# Patient Record
Sex: FEMALE | Race: WHITE | NOT HISPANIC OR LATINO | Employment: OTHER | URBAN - METROPOLITAN AREA
[De-identification: names, ages, dates, MRNs, and addresses within clinical notes are randomized per-mention and may not be internally consistent; named-entity substitution may affect disease eponyms.]

---

## 2017-01-25 ENCOUNTER — HOSPITAL ENCOUNTER (OUTPATIENT)
Dept: CARDIOLOGY | Facility: HOSPITAL | Age: 72
Discharge: HOME | End: 2017-01-25

## 2017-06-12 ENCOUNTER — HOSPITAL ENCOUNTER (OUTPATIENT)
Dept: LAB | Facility: HOSPITAL | Age: 72
Discharge: HOME | End: 2017-06-12
Attending: INTERNAL MEDICINE | Admitting: INTERNAL MEDICINE

## 2017-06-12 LAB
ALBUMIN SERPL-MCNC: 4 G/DL (ref 3.5–4.8)
ALP SERPL-CCNC: 115 IU/L (ref 32–91)
ALT SERPL-CCNC: 17 IU/L (ref 14–54)
ANION GAP SERPL CALC-SCNC: 11.5 MMOL/L (ref 10–20)
AST SERPL-CCNC: 19 IU/L (ref 15–41)
BILIRUB DIRECT SERPL-MCNC: 0.1 MG/DL (ref 0.1–0.5)
BILIRUB SERPL-MCNC: 0.6 MG/DL (ref 0.3–1.2)
BUN SERPL-MCNC: 12 MG/DL (ref 8–20)
BUN/CREAT SERPL: 13.3 (ref 5.4–26.2)
CALCIUM SERPL-MCNC: 9.5 MG/DL (ref 8.9–10.3)
CHLORIDE SERPL-SCNC: 104 MMOL/L (ref 101–111)
CHOLEST SERPL-MCNC: 130 MG/DL
CHOLEST/HDLC SERPL: 2.5 {RATIO}
CK SERPL-CCNC: 66 IU/L (ref 38–234)
CONV CO2: 28 MMOL/L (ref 22–32)
CONV LDL CHOLESTEROL DIRECT: 63 MG/DL (ref 0–100)
CONV TOTAL PROTEIN: 7 G/DL (ref 6.1–7.9)
CREAT UR-MCNC: 0.9 MG/DL (ref 0.4–1)
GLUCOSE SERPL-MCNC: 108 MG/DL (ref 65–99)
HDLC SERPL-MCNC: 53 MG/DL
LDLC/HDLC SERPL: 1.2 {RATIO}
LIPID INTERPRETATION: NORMAL
POTASSIUM SERPL-SCNC: 4.5 MMOL/L (ref 3.6–5.1)
SODIUM SERPL-SCNC: 139 MMOL/L (ref 136–144)
TRIGL SERPL-MCNC: 52 MG/DL
VLDLC SERPL CALC-MCNC: 13.7 MG/DL

## 2017-07-20 ENCOUNTER — HOSPITAL ENCOUNTER (OUTPATIENT)
Dept: MAMMOGRAPHY | Facility: HOSPITAL | Age: 72
Discharge: HOME | End: 2017-07-20
Attending: FAMILY MEDICINE | Admitting: FAMILY MEDICINE

## 2017-12-20 ENCOUNTER — HOSPITAL ENCOUNTER (OUTPATIENT)
Dept: CARDIOLOGY | Facility: HOSPITAL | Age: 72
Setting detail: SPECIMEN
Discharge: HOME | End: 2017-12-20
Attending: INTERNAL MEDICINE | Admitting: INTERNAL MEDICINE

## 2017-12-20 LAB
ALBUMIN SERPL-MCNC: 3.3 G/DL (ref 3.5–4.8)
ALP SERPL-CCNC: 99 IU/L (ref 32–91)
ALT SERPL-CCNC: 16 IU/L (ref 14–54)
ANION GAP SERPL CALC-SCNC: 13.3 MMOL/L (ref 10–20)
AST SERPL-CCNC: 17 IU/L (ref 15–41)
BILIRUB DIRECT SERPL-MCNC: 0.1 MG/DL (ref 0.1–0.5)
BILIRUB SERPL-MCNC: 0.6 MG/DL (ref 0.3–1.2)
BUN SERPL-MCNC: 15 MG/DL (ref 8–20)
BUN/CREAT SERPL: 16.7 (ref 5.4–26.2)
CALCIUM SERPL-MCNC: 9 MG/DL (ref 8.9–10.3)
CHLORIDE SERPL-SCNC: 98 MMOL/L (ref 101–111)
CHOLEST SERPL-MCNC: 119 MG/DL
CHOLEST/HDLC SERPL: 2.3 {RATIO}
CK SERPL-CCNC: 78 IU/L (ref 38–234)
CONV CO2: 27 MMOL/L (ref 22–32)
CONV LDL CHOLESTEROL DIRECT: 56 MG/DL (ref 0–100)
CONV TOTAL PROTEIN: 6.4 G/DL (ref 6.1–7.9)
CREAT UR-MCNC: 0.9 MG/DL (ref 0.4–1)
GLUCOSE SERPL-MCNC: 118 MG/DL (ref 65–99)
HDLC SERPL-MCNC: 51 MG/DL
LDLC/HDLC SERPL: 1.1 {RATIO}
LIPID INTERPRETATION: NORMAL
POTASSIUM SERPL-SCNC: 4.3 MMOL/L (ref 3.6–5.1)
SODIUM SERPL-SCNC: 134 MMOL/L (ref 136–144)
TRIGL SERPL-MCNC: 57 MG/DL
VLDLC SERPL CALC-MCNC: 12.2 MG/DL

## 2018-07-23 ENCOUNTER — HOSPITAL ENCOUNTER (OUTPATIENT)
Dept: CARDIOLOGY | Facility: HOSPITAL | Age: 73
Setting detail: SPECIMEN
Discharge: HOME | End: 2018-07-23
Attending: INTERNAL MEDICINE | Admitting: INTERNAL MEDICINE

## 2018-07-23 LAB
ALBUMIN SERPL-MCNC: 3.8 G/DL (ref 3.5–4.8)
ALP SERPL-CCNC: 115 IU/L (ref 32–91)
ALT SERPL-CCNC: 16 IU/L (ref 14–54)
ANION GAP SERPL CALC-SCNC: 11.3 MMOL/L (ref 10–20)
AST SERPL-CCNC: 20 IU/L (ref 15–41)
BILIRUB DIRECT SERPL-MCNC: 0.1 MG/DL (ref 0.1–0.5)
BILIRUB SERPL-MCNC: 0.7 MG/DL (ref 0.3–1.2)
BUN SERPL-MCNC: 16 MG/DL (ref 8–20)
BUN/CREAT SERPL: 16 (ref 5.4–26.2)
CALCIUM SERPL-MCNC: 9.5 MG/DL (ref 8.9–10.3)
CHLORIDE SERPL-SCNC: 102 MMOL/L (ref 101–111)
CHOLEST SERPL-MCNC: 119 MG/DL
CHOLEST/HDLC SERPL: 2.5 {RATIO}
CK SERPL-CCNC: 52 IU/L (ref 38–234)
CONV CO2: 28 MMOL/L (ref 22–32)
CONV LDL CHOLESTEROL DIRECT: 60 MG/DL (ref 0–100)
CONV TOTAL PROTEIN: 6.5 G/DL (ref 6.1–7.9)
CREAT UR-MCNC: 1 MG/DL (ref 0.4–1)
GLUCOSE SERPL-MCNC: 103 MG/DL (ref 65–99)
HDLC SERPL-MCNC: 47 MG/DL
LDLC/HDLC SERPL: 1.3 {RATIO}
LIPID INTERPRETATION: NORMAL
POTASSIUM SERPL-SCNC: 4.3 MMOL/L (ref 3.6–5.1)
SODIUM SERPL-SCNC: 137 MMOL/L (ref 136–144)
TRIGL SERPL-MCNC: 66 MG/DL
VLDLC SERPL CALC-MCNC: 12.1 MG/DL

## 2019-01-22 ENCOUNTER — HOSPITAL ENCOUNTER (OUTPATIENT)
Dept: LAB | Facility: HOSPITAL | Age: 74
Setting detail: SPECIMEN
Discharge: HOME | End: 2019-01-22
Attending: INTERNAL MEDICINE | Admitting: INTERNAL MEDICINE

## 2019-01-22 LAB
ALBUMIN SERPL-MCNC: 3.7 G/DL (ref 3.5–4.8)
ALP SERPL-CCNC: 132 IU/L (ref 32–91)
ALT SERPL-CCNC: 22 IU/L (ref 14–54)
ANION GAP SERPL CALC-SCNC: 15.6 MMOL/L (ref 10–20)
AST SERPL-CCNC: 24 IU/L (ref 15–41)
BILIRUB DIRECT SERPL-MCNC: 0.1 MG/DL (ref 0.1–0.5)
BILIRUB SERPL-MCNC: 0.5 MG/DL (ref 0.3–1.2)
BUN SERPL-MCNC: 10 MG/DL (ref 8–20)
BUN/CREAT SERPL: 11.1 (ref 5.4–26.2)
CALCIUM SERPL-MCNC: 9 MG/DL (ref 8.9–10.3)
CHLORIDE SERPL-SCNC: 97 MMOL/L (ref 101–111)
CHOLEST SERPL-MCNC: 126 MG/DL
CHOLEST/HDLC SERPL: 2.3 {RATIO}
CK SERPL-CCNC: 118 IU/L (ref 38–234)
CONV CO2: 25 MMOL/L (ref 22–32)
CONV LDL CHOLESTEROL DIRECT: 65 MG/DL (ref 0–100)
CONV TOTAL PROTEIN: 6.5 G/DL (ref 6.1–7.9)
CREAT UR-MCNC: 0.9 MG/DL (ref 0.4–1)
GLUCOSE SERPL-MCNC: 96 MG/DL (ref 65–99)
HDLC SERPL-MCNC: 55 MG/DL
LDLC/HDLC SERPL: 1.2 {RATIO}
LIPID INTERPRETATION: NORMAL
POTASSIUM SERPL-SCNC: 4.6 MMOL/L (ref 3.6–5.1)
SODIUM SERPL-SCNC: 133 MMOL/L (ref 136–144)
TRIGL SERPL-MCNC: 39 MG/DL
VLDLC SERPL CALC-MCNC: 6.3 MG/DL

## 2019-07-31 ENCOUNTER — OFFICE VISIT (OUTPATIENT)
Dept: FAMILY MEDICINE CLINIC | Facility: CLINIC | Age: 74
End: 2019-07-31

## 2019-07-31 VITALS
BODY MASS INDEX: 30.3 KG/M2 | OXYGEN SATURATION: 93 % | HEIGHT: 63 IN | TEMPERATURE: 98.2 F | SYSTOLIC BLOOD PRESSURE: 125 MMHG | WEIGHT: 171 LBS | DIASTOLIC BLOOD PRESSURE: 67 MMHG | HEART RATE: 53 BPM

## 2019-07-31 DIAGNOSIS — R06.2 WHEEZING: ICD-10-CM

## 2019-07-31 DIAGNOSIS — H61.22 IMPACTED CERUMEN OF LEFT EAR: ICD-10-CM

## 2019-07-31 DIAGNOSIS — J15.9 COMMUNITY ACQUIRED BACTERIAL PNEUMONIA: Primary | ICD-10-CM

## 2019-07-31 PROCEDURE — 99213 OFFICE O/P EST LOW 20 MIN: CPT | Performed by: FAMILY MEDICINE

## 2019-07-31 RX ORDER — DOXYCYCLINE HYCLATE 100 MG/1
100 TABLET, DELAYED RELEASE ORAL 2 TIMES DAILY
Qty: 20 TABLET | Refills: 0 | Status: SHIPPED | OUTPATIENT
Start: 2019-07-31 | End: 2019-09-18

## 2019-07-31 RX ORDER — BUDESONIDE AND FORMOTEROL FUMARATE DIHYDRATE 160; 4.5 UG/1; UG/1
AEROSOL RESPIRATORY (INHALATION)
COMMUNITY
Start: 2017-06-29 | End: 2019-07-31

## 2019-08-01 PROCEDURE — 69210 REMOVE IMPACTED EAR WAX UNI: CPT | Performed by: FAMILY MEDICINE

## 2019-08-12 PROBLEM — I25.10 CHRONIC CORONARY ARTERY DISEASE: Status: ACTIVE | Noted: 2018-07-30

## 2019-08-12 PROBLEM — Z12.31 OTHER SCREENING MAMMOGRAM: Status: ACTIVE | Noted: 2017-06-29

## 2019-08-12 PROBLEM — Z72.0 TOBACCO USE: Status: ACTIVE | Noted: 2019-08-12

## 2019-08-12 PROBLEM — Z00.00 ENCOUNTER FOR GENERAL ADULT MEDICAL EXAMINATION WITHOUT ABNORMAL FINDINGS: Status: ACTIVE | Noted: 2019-08-12

## 2019-08-12 PROBLEM — H91.90 HEARING LOSS: Status: ACTIVE | Noted: 2017-06-29

## 2019-08-12 PROBLEM — I83.90 VARICOSE VEINS OF LOWER EXTREMITY: Status: ACTIVE | Noted: 2017-01-25

## 2019-08-12 PROBLEM — M81.0 OSTEOPOROSIS: Status: ACTIVE | Noted: 2017-07-20

## 2019-08-13 RX ORDER — ALBUTEROL SULFATE 0.63 MG/3ML
0.63 SOLUTION RESPIRATORY (INHALATION) EVERY 6 HOURS PRN
Status: DISCONTINUED | OUTPATIENT
Start: 2019-07-31 | End: 2022-09-15

## 2019-08-13 RX ORDER — IPRATROPIUM BROMIDE AND ALBUTEROL SULFATE 2.5; .5 MG/3ML; MG/3ML
3 SOLUTION RESPIRATORY (INHALATION)
Status: SHIPPED | OUTPATIENT
Start: 2019-07-31 | End: 2019-07-31

## 2019-09-03 RX ORDER — ATORVASTATIN CALCIUM 40 MG/1
TABLET, FILM COATED ORAL
Qty: 90 TABLET | Refills: 0 | Status: SHIPPED | OUTPATIENT
Start: 2019-09-03 | End: 2019-09-04 | Stop reason: SDUPTHER

## 2019-09-04 RX ORDER — ATORVASTATIN CALCIUM 40 MG/1
TABLET, FILM COATED ORAL
Qty: 90 TABLET | Refills: 0 | Status: SHIPPED | OUTPATIENT
Start: 2019-09-04 | End: 2020-04-13 | Stop reason: SDUPTHER

## 2019-09-09 ENCOUNTER — LAB (OUTPATIENT)
Dept: LAB | Facility: HOSPITAL | Age: 74
End: 2019-09-09

## 2019-09-09 ENCOUNTER — TRANSCRIBE ORDERS (OUTPATIENT)
Dept: ADMINISTRATIVE | Facility: HOSPITAL | Age: 74
End: 2019-09-09

## 2019-09-09 DIAGNOSIS — I10 HYPERTENSION, UNSPECIFIED TYPE: ICD-10-CM

## 2019-09-09 DIAGNOSIS — Z72.0 TOBACCO ABUSE: ICD-10-CM

## 2019-09-09 DIAGNOSIS — I25.10 CAD IN NATIVE ARTERY: ICD-10-CM

## 2019-09-09 DIAGNOSIS — I25.10 CAD IN NATIVE ARTERY: Primary | ICD-10-CM

## 2019-09-09 LAB
ALBUMIN SERPL-MCNC: 3.8 G/DL (ref 3.5–4.8)
ALP SERPL-CCNC: 133 U/L (ref 32–91)
ALT SERPL W P-5'-P-CCNC: 17 U/L (ref 14–54)
ANION GAP SERPL CALCULATED.3IONS-SCNC: 12 MMOL/L (ref 5–15)
ARTICHOKE IGE QN: 62 MG/DL (ref 0–100)
AST SERPL-CCNC: 19 U/L (ref 15–41)
BILIRUB CONJ SERPL-MCNC: 0.2 MG/DL (ref 0.1–0.5)
BILIRUB INDIRECT SERPL-MCNC: 0.6 MG/DL (ref 0–1.1)
BILIRUB SERPL-MCNC: 0.8 MG/DL (ref 0.3–1.2)
BUN BLD-MCNC: 14 MG/DL (ref 8–20)
BUN/CREAT SERPL: 12.7 (ref 5.4–26.2)
CALCIUM SPEC-SCNC: 9.2 MG/DL (ref 8.9–10.3)
CHLORIDE SERPL-SCNC: 102 MMOL/L (ref 101–111)
CHOLEST SERPL-MCNC: 113 MG/DL
CK SERPL-CCNC: 65 U/L (ref 20–180)
CO2 SERPL-SCNC: 30 MMOL/L (ref 22–32)
CREAT BLD-MCNC: 1.1 MG/DL (ref 0.4–1)
GFR SERPL CREATININE-BSD FRML MDRD: 48 ML/MIN/1.73
GLUCOSE BLD-MCNC: 105 MG/DL (ref 65–99)
HDLC SERPL QL: 2.57
HDLC SERPL-MCNC: 44 MG/DL
LDLC/HDLC SERPL: 1.36 {RATIO}
POTASSIUM BLD-SCNC: 5 MMOL/L (ref 3.6–5.1)
PROT SERPL-MCNC: 6.9 G/DL (ref 6.1–7.9)
SODIUM BLD-SCNC: 139 MMOL/L (ref 136–144)
TRIGL SERPL-MCNC: 46 MG/DL
VLDLC SERPL-MCNC: 9.2 MG/DL

## 2019-09-09 PROCEDURE — 36415 COLL VENOUS BLD VENIPUNCTURE: CPT

## 2019-09-09 PROCEDURE — 80048 BASIC METABOLIC PNL TOTAL CA: CPT

## 2019-09-09 PROCEDURE — 80061 LIPID PANEL: CPT

## 2019-09-09 PROCEDURE — 82550 ASSAY OF CK (CPK): CPT

## 2019-09-09 PROCEDURE — 80076 HEPATIC FUNCTION PANEL: CPT

## 2019-09-18 ENCOUNTER — OFFICE VISIT (OUTPATIENT)
Dept: CARDIOLOGY | Facility: CLINIC | Age: 74
End: 2019-09-18

## 2019-09-18 VITALS
OXYGEN SATURATION: 98 % | DIASTOLIC BLOOD PRESSURE: 78 MMHG | WEIGHT: 170 LBS | HEART RATE: 54 BPM | HEIGHT: 63 IN | BODY MASS INDEX: 30.12 KG/M2 | SYSTOLIC BLOOD PRESSURE: 150 MMHG

## 2019-09-18 DIAGNOSIS — I10 ESSENTIAL HYPERTENSION: ICD-10-CM

## 2019-09-18 DIAGNOSIS — I25.10 CHRONIC CORONARY ARTERY DISEASE: Primary | ICD-10-CM

## 2019-09-18 DIAGNOSIS — J44.9 CHRONIC OBSTRUCTIVE PULMONARY DISEASE, UNSPECIFIED COPD TYPE (HCC): ICD-10-CM

## 2019-09-18 PROCEDURE — 99213 OFFICE O/P EST LOW 20 MIN: CPT | Performed by: INTERNAL MEDICINE

## 2019-09-18 PROCEDURE — 93000 ELECTROCARDIOGRAM COMPLETE: CPT | Performed by: INTERNAL MEDICINE

## 2019-09-18 NOTE — PROGRESS NOTES
"    Subjective:     Encounter Date:09/18/2019      Patient ID: Alfreda Gruber is a 74 y.o. female.    Chief Complaint: CAD and Cardiomyopathy  History of Present Illness    74 -year-old white female patient with symptoms of acute coronary syndrome admitted to the [facility] In 2015  and underwent drug eluting stent placement to the RCA. in the setting of acute non ST elevation myocardial infarction. patient found   to have proximal inferior wall hypokinesis, with severe 99% proximal RCA occlusion with a thrombus formation consistent with recent plaque rupture. patient is doing much better now without any active symptoms. patient is reasonably active at home.  Patient an echocardiogram done in June 2015 LV function is normal         labs done recently well controlled lipids and electrolytes      continue aspirin   EKG sinus rhythm without any significant ST abnormality   follow up in 6 months with labs and EKG  Patient did quit smoking finally    Past Medical History:   Diagnosis Date   • Acute coronary syndrome (CMS/HCC) 2015   • CAD (coronary artery disease) 02/27/2015   • Cardiomyopathy (CMS/HCC)    • COPD (chronic obstructive pulmonary disease) (CMS/[facility])    • Dyspnea    • GERD (gastroesophageal reflux disease)    • Glaucoma    • Hx of non-ST elevation myocardial infarction (NSTEMI) 2015     Past Surgical History:   Procedure Laterality Date   • CARDIAC SURGERY  02/27/2015    Heart Cath   • CORONARY ANGIOPLASTY WITH STENT PLACEMENT  02/27/2015    STACEY: proximal RCA   • TUMOR REMOVAL      from stomach     /78 (BP Location: Left arm, Patient Position: Sitting, Cuff Size: Adult)   Pulse 54   Ht 160 cm (63\")   Wt 77.1 kg (170 lb)   SpO2 98%   BMI 30.11 kg/m²   Family History   Problem Relation Age of Onset   • Breast cancer Mother    • Lung cancer Father    • Heart disease Sister    • Diabetes Sister    • Stroke Sister    • Heart attack Sister    • Diabetes Paternal Grandfather        Current " Outpatient Medications:   •  alendronate (FOSAMAX) 70 MG tablet, Take 70 mg by mouth Every 7 (Seven) Days., Disp: , Rfl:   •  aspirin 81 MG EC tablet, Take 81 mg by mouth Daily., Disp: , Rfl:   •  atorvastatin (LIPITOR) 40 MG tablet, TAKE 1 TABLET BY MOUTH DAILY, Disp: 90 tablet, Rfl: 0  •  budesonide-formoterol (SYMBICORT) 160-4.5 MCG/ACT inhaler, Inhale 2 puffs 2 (Two) Times a Day., Disp: , Rfl:   •  metoprolol tartrate (LOPRESSOR) 25 MG tablet, Every 12 (Twelve) Hours., Disp: , Rfl:     Current Facility-Administered Medications:   •  albuterol (ACCUNEB) nebulizer solution 0.63 mg, 0.63 mg, Nebulization, Q6H PRN, Chika Islas MD  Social History     Socioeconomic History   • Marital status:      Spouse name: Not on file   • Number of children: Not on file   • Years of education: Not on file   • Highest education level: Not on file   Tobacco Use   • Smoking status: Former Smoker     Types: Cigarettes     Start date:      Last attempt to quit: 2019     Years since quittin.7   • Smokeless tobacco: Never Used   Substance and Sexual Activity   • Alcohol use: No     Frequency: Never   • Drug use: No   • Sexual activity: Defer     Allergies   Allergen Reactions   • Penicillins Rash     Review of Systems   Constitution: Negative for fever and malaise/fatigue.   HENT: Negative for congestion and hearing loss.    Eyes: Negative for double vision and visual disturbance.   Cardiovascular: Negative for chest pain, claudication, dyspnea on exertion (COPD), leg swelling and syncope.   Respiratory: Negative for cough and shortness of breath.    Endocrine: Negative for cold intolerance.   Skin: Negative for color change and rash.   Musculoskeletal: Negative for arthritis and joint pain.   Gastrointestinal: Negative for abdominal pain and heartburn.   Genitourinary: Negative for hematuria.   Neurological: Negative for excessive daytime sleepiness and dizziness.   Psychiatric/Behavioral: Negative for  depression. The patient is not nervous/anxious.    All other systems reviewed and are negative.             Objective:     Physical Exam   Constitutional: She is oriented to person, place, and time. She appears well-developed and well-nourished. She is cooperative.   HENT:   Head: Normocephalic and atraumatic.   Mouth/Throat: Uvula is midline and oropharynx is clear and moist. No oral lesions.   Eyes: Conjunctivae are normal. No scleral icterus.   Neck: Trachea normal. Neck supple. No JVD present. Carotid bruit is not present. No thyromegaly present.   Cardiovascular: Normal rate, regular rhythm, S1 normal, S2 normal, normal heart sounds, intact distal pulses and normal pulses. PMI is not displaced. Exam reveals no gallop and no friction rub.   No murmur heard.  Pulmonary/Chest: Effort normal and breath sounds normal.   Abdominal: Soft. Bowel sounds are normal.   Musculoskeletal: Normal range of motion.   Neurological: She is alert and oriented to person, place, and time. She has normal strength.   No focal deficits   Skin: Skin is warm. No cyanosis.   Psychiatric: She has a normal mood and affect.         ECG 12 Lead  Date/Time: 9/18/2019 5:28 PM  Performed by: Babak Leonardo MD  Authorized by: Babak Leonardo MD   Comments: EKG done today showed normal sinus rhythm borderline poor R wave progression V1 to V3 inferior axis compared to the last EKG no significant change            Lab Review:       Assessment:          Diagnosis Plan   1. Chronic coronary artery disease  CK    Comprehensive Metabolic Panel    Lipid Panel   2. Essential hypertension  CK    Comprehensive Metabolic Panel    Lipid Panel   3. Chronic obstructive pulmonary disease, unspecified COPD type (CMS/HCC)  CK    Comprehensive Metabolic Panel    Lipid Panel          Plan:           CAD status post PCI stable  Hypertension continue aggressive control  COPD stable

## 2019-12-03 RX ORDER — ATORVASTATIN CALCIUM 40 MG/1
TABLET, FILM COATED ORAL
Qty: 90 TABLET | Refills: 0 | Status: SHIPPED | OUTPATIENT
Start: 2019-12-03 | End: 2020-03-02

## 2020-03-02 RX ORDER — ATORVASTATIN CALCIUM 40 MG/1
TABLET, FILM COATED ORAL
Qty: 90 TABLET | Refills: 1 | Status: SHIPPED | OUTPATIENT
Start: 2020-03-02 | End: 2020-11-30

## 2020-04-13 ENCOUNTER — OFFICE VISIT (OUTPATIENT)
Dept: CARDIOLOGY | Facility: CLINIC | Age: 75
End: 2020-04-13

## 2020-04-13 DIAGNOSIS — Z72.0 TOBACCO USE: ICD-10-CM

## 2020-04-13 DIAGNOSIS — I25.10 CHRONIC CORONARY ARTERY DISEASE: Primary | ICD-10-CM

## 2020-04-13 DIAGNOSIS — I10 ESSENTIAL HYPERTENSION: ICD-10-CM

## 2020-04-13 PROBLEM — E78.5 DYSLIPIDEMIA: Status: ACTIVE | Noted: 2020-04-13

## 2020-04-13 PROCEDURE — 99441 PR PHYS/QHP TELEPHONE EVALUATION 5-10 MIN: CPT | Performed by: INTERNAL MEDICINE

## 2020-04-13 RX ORDER — LATANOPROST 50 UG/ML
SOLUTION/ DROPS OPHTHALMIC
COMMUNITY
Start: 2020-02-28

## 2020-04-13 NOTE — PROGRESS NOTES
You have chosen to receive care through a telephone visit. Do you consent to use a telephone visit for your medical care today? Yes  This visit has been rescheduled as a phone visit to comply with patient safety concerns in accordance with CDC recommendations. Total time of discussion was 8 minutes.        Subjective:     Encounter Date:04/13/2020      Patient ID: Alfreda Gruber is a 74 y.o. female.    Chief Complaint: Follow-up for CAD  History of Present Illness     74 -year-old white female patient with symptoms of acute coronary syndrome admitted to the [facility] In 2015  and underwent drug eluting stent placement to the RCA. in the setting of acute non ST elevation myocardial infarction. patient found   to have proximal inferior wall hypokinesis, with severe 99% proximal RCA occlusion with a thrombus formation consistent with recent plaque rupture. patient is doing much better now without any active symptoms. patient is reasonably active at home.  Patient an echocardiogram done in June 2015 LV function is normal         labs done recently well controlled lipids and electrolytes      continue aspirin  Patient is doing a telemetry visit today    The following portions of the patient's history were reviewed and updated as appropriate: Allergies current medications past family history past medical history past social history past surgical history problem list and review of systems  Past Medical History:   Diagnosis Date   • Acute coronary syndrome (CMS/[facility]) 2015   • CAD (coronary artery disease) 02/27/2015   • Cardiomyopathy (CMS/[facility])    • COPD (chronic obstructive pulmonary disease) (CMS/[facility])    • Dyspnea    • GERD (gastroesophageal reflux disease)    • Glaucoma    • Hx of non-ST elevation myocardial infarction (NSTEMI) 2015     Past Surgical History:   Procedure Laterality Date   • CARDIAC SURGERY  02/27/2015    Heart Cath   • CORONARY ANGIOPLASTY WITH STENT PLACEMENT  02/27/2015    STACEY: proximal  RCA   • TUMOR REMOVAL      from stomach     There were no vitals taken for this visit.  Family History   Problem Relation Age of Onset   • Breast cancer Mother    • Lung cancer Father    • Heart disease Sister    • Diabetes Sister    • Stroke Sister    • Heart attack Sister    • Diabetes Paternal Grandfather        Current Outpatient Medications:   •  aspirin 81 MG EC tablet, Take 81 mg by mouth Daily., Disp: , Rfl:   •  atorvastatin (LIPITOR) 40 MG tablet, TAKE 1 TABLET BY MOUTH DAILY, Disp: 90 tablet, Rfl: 1  •  metoprolol tartrate (LOPRESSOR) 25 MG tablet, TAKE 1/2 TABLET BY MOUTH TWICE DAILY, Disp: 90 tablet, Rfl: 3  •  latanoprost (XALATAN) 0.005 % ophthalmic solution, PLACE 1 GTT IN OU QPM, Disp: , Rfl:     Current Facility-Administered Medications:   •  albuterol (ACCUNEB) nebulizer solution 0.63 mg, 0.63 mg, Nebulization, Q6H PRN, Chika Islas MD  Social History     Socioeconomic History   • Marital status:      Spouse name: Not on file   • Number of children: Not on file   • Years of education: Not on file   • Highest education level: Not on file   Tobacco Use   • Smoking status: Former Smoker     Types: Cigarettes     Start date:      Last attempt to quit: 2019     Years since quittin.2   • Smokeless tobacco: Never Used   Substance and Sexual Activity   • Alcohol use: No     Frequency: Never   • Drug use: No   • Sexual activity: Defer     Allergies   Allergen Reactions   • Penicillins Rash     Review of Systems   Constitution: Negative for fever and malaise/fatigue.   HENT: Negative for congestion and hearing loss.    Eyes: Negative for double vision and visual disturbance.   Cardiovascular: Negative for chest pain, claudication, dyspnea on exertion, leg swelling and syncope.   Respiratory: Negative for cough and shortness of breath.    Endocrine: Negative for cold intolerance.   Skin: Negative for color change and rash.   Musculoskeletal: Negative for arthritis and joint pain.    Gastrointestinal: Negative for abdominal pain and heartburn.   Genitourinary: Negative for hematuria.   Neurological: Negative for excessive daytime sleepiness and dizziness.   Psychiatric/Behavioral: Negative for depression. The patient is not nervous/anxious.    All other systems reviewed and are negative.             Objective:     Physical Exam    Procedures    Lab Review:       Assessment:          Diagnosis Plan   1. Chronic coronary artery disease  CK    Comprehensive Metabolic Panel    Lipid Panel   2. Essential hypertension  CK    Comprehensive Metabolic Panel    Lipid Panel   3. Tobacco use  CK    Comprehensive Metabolic Panel    Lipid Panel          Plan:       Continue aggressive control of hypertension modify cardiac risk factors  CAD stable patient was advised to stop smoking

## 2020-10-06 ENCOUNTER — LAB (OUTPATIENT)
Dept: LAB | Facility: HOSPITAL | Age: 75
End: 2020-10-06

## 2020-10-06 DIAGNOSIS — I25.10 CHRONIC CORONARY ARTERY DISEASE: ICD-10-CM

## 2020-10-06 DIAGNOSIS — I10 ESSENTIAL HYPERTENSION: ICD-10-CM

## 2020-10-06 DIAGNOSIS — Z72.0 TOBACCO USE: ICD-10-CM

## 2020-10-06 LAB
ALBUMIN SERPL-MCNC: 4.5 G/DL (ref 3.5–5.2)
ALBUMIN/GLOB SERPL: 1.7 G/DL
ALP SERPL-CCNC: 160 U/L (ref 39–117)
ALT SERPL W P-5'-P-CCNC: 15 U/L (ref 1–33)
ANION GAP SERPL CALCULATED.3IONS-SCNC: 9.4 MMOL/L (ref 5–15)
AST SERPL-CCNC: 18 U/L (ref 1–32)
BILIRUB SERPL-MCNC: 0.5 MG/DL (ref 0–1.2)
BUN SERPL-MCNC: 17 MG/DL (ref 8–23)
BUN/CREAT SERPL: 17.9 (ref 7–25)
CALCIUM SPEC-SCNC: 9.6 MG/DL (ref 8.6–10.5)
CHLORIDE SERPL-SCNC: 100 MMOL/L (ref 98–107)
CHOLEST SERPL-MCNC: 118 MG/DL (ref 0–200)
CK SERPL-CCNC: 78 U/L (ref 20–180)
CO2 SERPL-SCNC: 29.6 MMOL/L (ref 22–29)
CREAT SERPL-MCNC: 0.95 MG/DL (ref 0.57–1)
GFR SERPL CREATININE-BSD FRML MDRD: 57 ML/MIN/1.73
GLOBULIN UR ELPH-MCNC: 2.6 GM/DL
GLUCOSE SERPL-MCNC: 109 MG/DL (ref 65–99)
HDLC SERPL-MCNC: 56 MG/DL (ref 40–60)
LDLC SERPL CALC-MCNC: 50 MG/DL (ref 0–100)
LDLC/HDLC SERPL: 0.89 {RATIO}
POTASSIUM SERPL-SCNC: 4.7 MMOL/L (ref 3.5–5.2)
PROT SERPL-MCNC: 7.1 G/DL (ref 6–8.5)
SODIUM SERPL-SCNC: 139 MMOL/L (ref 136–145)
TRIGL SERPL-MCNC: 60 MG/DL (ref 0–150)
VLDLC SERPL-MCNC: 12 MG/DL

## 2020-10-06 PROCEDURE — 82550 ASSAY OF CK (CPK): CPT

## 2020-10-06 PROCEDURE — 80061 LIPID PANEL: CPT

## 2020-10-06 PROCEDURE — 36415 COLL VENOUS BLD VENIPUNCTURE: CPT

## 2020-10-06 PROCEDURE — 80053 COMPREHEN METABOLIC PANEL: CPT

## 2020-10-14 ENCOUNTER — OFFICE VISIT (OUTPATIENT)
Dept: CARDIOLOGY | Facility: CLINIC | Age: 75
End: 2020-10-14

## 2020-10-14 VITALS
WEIGHT: 165 LBS | HEART RATE: 56 BPM | HEIGHT: 63 IN | BODY MASS INDEX: 29.23 KG/M2 | OXYGEN SATURATION: 97 % | SYSTOLIC BLOOD PRESSURE: 126 MMHG | DIASTOLIC BLOOD PRESSURE: 76 MMHG

## 2020-10-14 DIAGNOSIS — I10 ESSENTIAL HYPERTENSION: ICD-10-CM

## 2020-10-14 DIAGNOSIS — E78.5 DYSLIPIDEMIA: ICD-10-CM

## 2020-10-14 DIAGNOSIS — I25.10 CHRONIC CORONARY ARTERY DISEASE: Primary | ICD-10-CM

## 2020-10-14 DIAGNOSIS — Z72.0 TOBACCO USE: ICD-10-CM

## 2020-10-14 PROCEDURE — 93000 ELECTROCARDIOGRAM COMPLETE: CPT | Performed by: INTERNAL MEDICINE

## 2020-10-14 PROCEDURE — 99214 OFFICE O/P EST MOD 30 MIN: CPT | Performed by: INTERNAL MEDICINE

## 2020-10-14 NOTE — PROGRESS NOTES
"    Subjective:     Encounter Date:10/14/2020      Patient ID: Alfreda Gruber is a 75 y.o. female.    Chief Complaint: Coronary Artery Disease  History of Present Illness     75 -year-old white female patient with symptoms of acute coronary syndrome admitted to the [facility] In 2015  and underwent drug eluting stent placement to the RCA. in the setting of acute non ST elevation myocardial infarction. patient found   to have proximal inferior wall hypokinesis, with severe 99% proximal RCA occlusion with a thrombus formation consistent with recent plaque rupture. patient is doing much better now without any active symptoms. patient is reasonably active at home.  Patient an echocardiogram done in June 2015 LV function is normal         labs done recently well controlled lipids and electrolytes      continue aspirin  Patient comes back for follow-up doing well without any active symptoms advised her to stop smoking modify cardiac risk factors    The following portions of the patient's history were reviewed and updated as appropriate: Allergies current medications past family history past medical history past social history past surgical history problem list and review of systems  Past Medical History:   Diagnosis Date   • Acute coronary syndrome (CMS/[facility]) 2015   • CAD (coronary artery disease) 02/27/2015   • Cardiomyopathy (CMS/[facility])    • COPD (chronic obstructive pulmonary disease) (CMS/[facility])    • Dyspnea    • GERD (gastroesophageal reflux disease)    • Glaucoma    • Hx of non-ST elevation myocardial infarction (NSTEMI) 2015     Past Surgical History:   Procedure Laterality Date   • CARDIAC SURGERY  02/27/2015    Heart Cath   • CORONARY ANGIOPLASTY WITH STENT PLACEMENT  02/27/2015    STACEY: proximal RCA   • TUMOR REMOVAL      from stomach     /76 (BP Location: Left arm, Patient Position: Sitting, Cuff Size: Adult)   Pulse 56   Ht 160 cm (63\")   Wt 74.8 kg (165 lb)   SpO2 97%   BMI 29.23 kg/m² "   Family History   Problem Relation Age of Onset   • Breast cancer Mother    • Lung cancer Father    • Heart disease Sister    • Diabetes Sister    • Stroke Sister    • Heart attack Sister    • Diabetes Paternal Grandfather        Current Outpatient Medications:   •  aspirin 81 MG EC tablet, Take 81 mg by mouth 2 (two) times a day., Disp: , Rfl:   •  atorvastatin (LIPITOR) 40 MG tablet, TAKE 1 TABLET BY MOUTH DAILY, Disp: 90 tablet, Rfl: 1  •  latanoprost (XALATAN) 0.005 % ophthalmic solution, PLACE 1 GTT IN OU QPM, Disp: , Rfl:   •  metoprolol tartrate (LOPRESSOR) 25 MG tablet, TAKE 1 TABLET BY MOUTH TWICE DAILY, Disp: 180 tablet, Rfl: 0    Current Facility-Administered Medications:   •  albuterol (ACCUNEB) nebulizer solution 0.63 mg, 0.63 mg, Nebulization, Q6H PRN, Chika Islas MD  Social History     Socioeconomic History   • Marital status:      Spouse name: Not on file   • Number of children: Not on file   • Years of education: Not on file   • Highest education level: Not on file   Tobacco Use   • Smoking status: Current Every Day Smoker     Packs/day: 0.50     Types: Cigarettes     Start date: 1973   • Smokeless tobacco: Never Used   Substance and Sexual Activity   • Alcohol use: No     Frequency: Never   • Drug use: No   • Sexual activity: Defer     Allergies   Allergen Reactions   • Penicillins Rash     Review of Systems   Constitution: Negative for fever and malaise/fatigue.   HENT: Negative for congestion and hearing loss.    Eyes: Negative for double vision and visual disturbance.   Cardiovascular: Negative for chest pain, claudication, dyspnea on exertion, leg swelling and syncope.   Respiratory: Negative for cough and shortness of breath.    Endocrine: Negative for cold intolerance.   Skin: Negative for color change and rash.   Musculoskeletal: Negative for arthritis and joint pain.   Gastrointestinal: Negative for abdominal pain and heartburn.   Genitourinary: Negative for  hematuria.   Neurological: Negative for excessive daytime sleepiness and dizziness.   Psychiatric/Behavioral: Negative for depression. The patient is not nervous/anxious.    All other systems reviewed and are negative.             Objective:     Constitutional:       Appearance: Well-developed.   Eyes:      General: No scleral icterus.     Conjunctiva/sclera: Conjunctivae normal.   HENT:      Head: Normocephalic and atraumatic.    Mouth/Throat:      Mouth: No oral lesions.      Pharynx: Uvula midline.   Neck:      Musculoskeletal: Neck supple.      Thyroid: No thyromegaly.      Vascular: No carotid bruit or JVD.      Trachea: Trachea normal.   Pulmonary:      Effort: Pulmonary effort is normal.      Breath sounds: Normal breath sounds.   Cardiovascular:      Normal rate. Regular rhythm.      No gallop.   Pulses:     Intact distal pulses.   Abdominal:      General: Bowel sounds are normal.      Palpations: Abdomen is soft.   Musculoskeletal: Normal range of motion.   Skin:     General: Skin is warm. There is no cyanosis.   Neurological:      Mental Status: Alert and oriented to person, place, and time.      Comments: No focal deficits   Psychiatric:         Behavior: Behavior is cooperative.           ECG 12 Lead    Date/Time: 10/14/2020 1:52 PM  Performed by: Babak Leonardo MD  Authorized by: Babak Leonardo MD   Comments: EKG today sinus rhythm borderline inferior axis compared to the last EKG no significant changes noted            Lab Review:       Assessment:          Diagnosis Plan   1. Chronic coronary artery disease  CK    Comprehensive Metabolic Panel    Lipid Panel   2. Essential hypertension  CK    Comprehensive Metabolic Panel    Lipid Panel   3. Dyslipidemia  CK    Comprehensive Metabolic Panel    Lipid Panel   4. Tobacco use  CK    Comprehensive Metabolic Panel    Lipid Panel          Plan:         CAD status post PCI stable  Hypertension dyslipidemia stable  Patient was advised  to stop smoking modify cardiac risk factors

## 2020-11-04 ENCOUNTER — FLU SHOT (OUTPATIENT)
Dept: FAMILY MEDICINE CLINIC | Facility: CLINIC | Age: 75
End: 2020-11-04

## 2020-11-04 DIAGNOSIS — Z23 IMMUNIZATION DUE: Primary | ICD-10-CM

## 2020-11-04 PROCEDURE — G0008 ADMIN INFLUENZA VIRUS VAC: HCPCS | Performed by: FAMILY MEDICINE

## 2020-11-04 PROCEDURE — 90694 VACC AIIV4 NO PRSRV 0.5ML IM: CPT | Performed by: FAMILY MEDICINE

## 2020-11-30 RX ORDER — ATORVASTATIN CALCIUM 40 MG/1
TABLET, FILM COATED ORAL
Qty: 90 TABLET | Refills: 1 | Status: SHIPPED | OUTPATIENT
Start: 2020-11-30 | End: 2021-06-01

## 2021-01-19 ENCOUNTER — OFFICE VISIT (OUTPATIENT)
Dept: FAMILY MEDICINE CLINIC | Facility: CLINIC | Age: 76
End: 2021-01-19

## 2021-01-19 VITALS
DIASTOLIC BLOOD PRESSURE: 85 MMHG | WEIGHT: 167 LBS | HEART RATE: 62 BPM | TEMPERATURE: 97.8 F | SYSTOLIC BLOOD PRESSURE: 154 MMHG | OXYGEN SATURATION: 96 % | BODY MASS INDEX: 29.58 KG/M2

## 2021-01-19 DIAGNOSIS — J44.1 COPD WITH EXACERBATION (HCC): Primary | ICD-10-CM

## 2021-01-19 DIAGNOSIS — R09.81 NASAL SINUS CONGESTION: ICD-10-CM

## 2021-01-19 PROCEDURE — 99214 OFFICE O/P EST MOD 30 MIN: CPT | Performed by: NURSE PRACTITIONER

## 2021-01-19 RX ORDER — FLUTICASONE PROPIONATE 50 MCG
2 SPRAY, SUSPENSION (ML) NASAL DAILY
Qty: 1 BOTTLE | Refills: 2 | Status: SHIPPED | OUTPATIENT
Start: 2021-01-19 | End: 2021-01-20

## 2021-01-19 RX ORDER — BUDESONIDE AND FORMOTEROL FUMARATE DIHYDRATE 160; 4.5 UG/1; UG/1
2 AEROSOL RESPIRATORY (INHALATION)
Qty: 6 G | Refills: 12 | Status: SHIPPED | OUTPATIENT
Start: 2021-01-19 | End: 2021-01-19

## 2021-01-19 RX ORDER — ALBUTEROL SULFATE 90 UG/1
2 AEROSOL, METERED RESPIRATORY (INHALATION) EVERY 4 HOURS PRN
Qty: 6.7 G | Refills: 2 | Status: SHIPPED | OUTPATIENT
Start: 2021-01-19 | End: 2022-09-15 | Stop reason: SDUPTHER

## 2021-01-19 NOTE — PATIENT INSTRUCTIONS
Start using flonase nasal spray daily.  Start using breo daily  If wheezing does not improve let me know  Drink plenty of water  Albuterol as needed  Work on smoking cessation  Start zyrtec daily

## 2021-01-19 NOTE — PROGRESS NOTES
Subjective   Alfreda Gruber is a 75 y.o. female.     Pt is here today with c/o COPD exacerbation.  For the last 2 weeks she has had increased SOA, wheezing, and coughing.  She has been out of her symbicort for the last 2 weeks and insurance will not cover it at this point.  She states that it will cost her $200.  Denies fever, fatigue, or chills.  Denies any exposure to covid.   Denies congestion, sore throat.  She feels like her ears are full.  She states that she doesn't feel ill, just has some wheezing.          The following portions of the patient's history were reviewed and updated as appropriate: allergies, current medications, past family history, past medical history, past social history, past surgical history and problem list.    Review of Systems   Constitutional: Negative for chills, fatigue and fever.   HENT: Positive for congestion and ear pain (ears full). Negative for sinus pressure and sore throat.    Respiratory: Positive for cough, chest tightness, shortness of breath and wheezing.    Cardiovascular: Negative for chest pain and palpitations.   Gastrointestinal: Negative for abdominal pain, constipation, diarrhea, nausea and vomiting.   Neurological: Negative for dizziness and headache.       Objective   /85   Pulse 62   Temp 97.8 °F (36.6 °C) (Tympanic)   Wt 75.8 kg (167 lb)   SpO2 96%   BMI 29.58 kg/m²   Physical Exam  Vitals signs reviewed.   Constitutional:       General: She is not in acute distress.     Appearance: Normal appearance. She is well-developed. She is not diaphoretic.   HENT:      Head: Normocephalic and atraumatic.      Right Ear: There is impacted cerumen.      Left Ear: There is impacted cerumen.      Ears:      Comments: Attempted to clean out ears- unable to get all of wax  Eyes:      General:         Right eye: No discharge.         Left eye: No discharge.      Conjunctiva/sclera: Conjunctivae normal.      Pupils: Pupils are equal, round, and reactive to light.    Neck:      Musculoskeletal: Normal range of motion and neck supple.   Cardiovascular:      Rate and Rhythm: Normal rate and regular rhythm.      Heart sounds: No murmur.   Pulmonary:      Effort: Pulmonary effort is normal. No respiratory distress.      Breath sounds: Examination of the left-upper field reveals wheezing. Examination of the left-lower field reveals wheezing. Wheezing present. No rhonchi or rales.      Comments: Mild wheezing left upper and lower posterior lobes  Abdominal:      General: Bowel sounds are normal. There is no distension.      Palpations: Abdomen is soft.      Tenderness: There is no abdominal tenderness.   Musculoskeletal: Normal range of motion.   Skin:     General: Skin is warm and dry.   Neurological:      General: No focal deficit present.      Mental Status: She is alert and oriented to person, place, and time.   Psychiatric:         Mood and Affect: Mood normal.         Behavior: Behavior normal.         Thought Content: Thought content normal.         Judgment: Judgment normal.           Assessment/Plan     Diagnoses and all orders for this visit:    1. COPD with exacerbation (CMS/[facility]) (Primary)  Comments:  out of inhaler  will send in Breo  some wheezing in left lobes  see if inhaler improves.  call if no improvement  smoking cessation  flonase  Orders:  -     Discontinue: budesonide-formoterol (Symbicort) 160-4.5 MCG/ACT inhaler; Inhale 2 puffs 2 (Two) Times a Day.  Dispense: 6 g; Refill: 12  -     Fluticasone Furoate-Vilanterol (Breo Ellipta) 200-25 MCG/INH inhaler; Inhale 1 puff Daily.  Dispense: 28 each; Refill: 12  -     albuterol sulfate  (90 Base) MCG/ACT inhaler; Inhale 2 puffs Every 4 (Four) Hours As Needed for Wheezing or Shortness of Air.  Dispense: 6.7 g; Refill: 2    2. Nasal sinus congestion  Comments:  start flonase and zyrtec  call if no improvement  Orders:  -     fluticasone (Flonase) 50 MCG/ACT nasal spray; 2 sprays into the nostril(s) as directed by  provider Daily.  Dispense: 1 bottle; Refill: 2

## 2021-01-20 RX ORDER — FLUTICASONE PROPIONATE 50 MCG
SPRAY, SUSPENSION (ML) NASAL
Qty: 48 G | Refills: 2 | Status: SHIPPED | OUTPATIENT
Start: 2021-01-20 | End: 2022-09-15

## 2021-04-05 ENCOUNTER — LAB (OUTPATIENT)
Dept: LAB | Facility: HOSPITAL | Age: 76
End: 2021-04-05

## 2021-04-05 DIAGNOSIS — I25.10 CHRONIC CORONARY ARTERY DISEASE: ICD-10-CM

## 2021-04-05 DIAGNOSIS — Z72.0 TOBACCO USE: ICD-10-CM

## 2021-04-05 DIAGNOSIS — I10 ESSENTIAL HYPERTENSION: ICD-10-CM

## 2021-04-05 DIAGNOSIS — E78.5 DYSLIPIDEMIA: ICD-10-CM

## 2021-04-05 LAB
ALBUMIN SERPL-MCNC: 4 G/DL (ref 3.5–5.2)
ALBUMIN/GLOB SERPL: 1.8 G/DL
ALP SERPL-CCNC: 140 U/L (ref 39–117)
ALT SERPL W P-5'-P-CCNC: 9 U/L (ref 1–33)
ANION GAP SERPL CALCULATED.3IONS-SCNC: 7.4 MMOL/L (ref 5–15)
AST SERPL-CCNC: 15 U/L (ref 1–32)
BILIRUB SERPL-MCNC: 0.6 MG/DL (ref 0–1.2)
BUN SERPL-MCNC: 14 MG/DL (ref 8–23)
BUN/CREAT SERPL: 15.4 (ref 7–25)
CALCIUM SPEC-SCNC: 9.6 MG/DL (ref 8.6–10.5)
CHLORIDE SERPL-SCNC: 102 MMOL/L (ref 98–107)
CHOLEST SERPL-MCNC: 116 MG/DL (ref 0–200)
CK SERPL-CCNC: 69 U/L (ref 20–180)
CO2 SERPL-SCNC: 28.6 MMOL/L (ref 22–29)
CREAT SERPL-MCNC: 0.91 MG/DL (ref 0.57–1)
GFR SERPL CREATININE-BSD FRML MDRD: 60 ML/MIN/1.73
GLOBULIN UR ELPH-MCNC: 2.2 GM/DL
GLUCOSE SERPL-MCNC: 100 MG/DL (ref 65–99)
HDLC SERPL-MCNC: 56 MG/DL (ref 40–60)
LDLC SERPL CALC-MCNC: 47 MG/DL (ref 0–100)
LDLC/HDLC SERPL: 0.88 {RATIO}
POTASSIUM SERPL-SCNC: 4.8 MMOL/L (ref 3.5–5.2)
PROT SERPL-MCNC: 6.2 G/DL (ref 6–8.5)
SODIUM SERPL-SCNC: 138 MMOL/L (ref 136–145)
TRIGL SERPL-MCNC: 55 MG/DL (ref 0–150)
VLDLC SERPL-MCNC: 13 MG/DL (ref 5–40)

## 2021-04-05 PROCEDURE — 80061 LIPID PANEL: CPT

## 2021-04-05 PROCEDURE — 80053 COMPREHEN METABOLIC PANEL: CPT

## 2021-04-05 PROCEDURE — 36415 COLL VENOUS BLD VENIPUNCTURE: CPT

## 2021-04-05 PROCEDURE — 82550 ASSAY OF CK (CPK): CPT

## 2021-04-14 ENCOUNTER — OFFICE VISIT (OUTPATIENT)
Dept: CARDIOLOGY | Facility: CLINIC | Age: 76
End: 2021-04-14

## 2021-04-14 VITALS
OXYGEN SATURATION: 96 % | HEIGHT: 63 IN | WEIGHT: 167 LBS | DIASTOLIC BLOOD PRESSURE: 81 MMHG | BODY MASS INDEX: 29.59 KG/M2 | HEART RATE: 54 BPM | TEMPERATURE: 96.9 F | SYSTOLIC BLOOD PRESSURE: 145 MMHG

## 2021-04-14 DIAGNOSIS — Z72.0 TOBACCO USE: ICD-10-CM

## 2021-04-14 DIAGNOSIS — E78.5 DYSLIPIDEMIA: ICD-10-CM

## 2021-04-14 DIAGNOSIS — I25.10 CHRONIC CORONARY ARTERY DISEASE: Primary | ICD-10-CM

## 2021-04-14 DIAGNOSIS — I10 ESSENTIAL HYPERTENSION: ICD-10-CM

## 2021-04-14 PROCEDURE — 99214 OFFICE O/P EST MOD 30 MIN: CPT | Performed by: INTERNAL MEDICINE

## 2021-04-14 NOTE — PROGRESS NOTES
Subjective:     Encounter Date:04/14/2021      Patient ID: Alfreda Gruber is a 75 y.o. female.    Chief Complaint: Coronary Artery Disease  History of Present Illness     75 -year-old white female patient with symptoms of acute coronary syndrome admitted to the [facility] In 2015  and underwent drug eluting stent placement to the RCA. in the setting of acute non ST elevation myocardial infarction. patient found   to have proximal inferior wall hypokinesis, with severe 99% proximal RCA occlusion with a thrombus formation consistent with recent plaque rupture. patient is doing much better now without any active symptoms. patient is reasonably active at home.  Patient an echocardiogram done in June 2015 LV function is normal     Patient comes back for follow-up without any active symptoms advised her to stop smoking to modify cardiac risk factors     labs done recently well controlled lipids and electrolytes  Blood sugar 100 alkaline phosphatase mildly elevated      continue aspirin      The following portions of the patient's history were reviewed and updated as appropriate: Allergies current medications past family history past medical history past social history past surgical history problem list and review of systems  Past Medical History:   Diagnosis Date   • Acute coronary syndrome (CMS/[facility]) 2015   • CAD (coronary artery disease) 02/27/2015   • Cardiomyopathy (CMS/[facility])    • COPD (chronic obstructive pulmonary disease) (CMS/[facility])    • Dyspnea    • GERD (gastroesophageal reflux disease)    • Glaucoma    • Hx of non-ST elevation myocardial infarction (NSTEMI) 2015     Past Surgical History:   Procedure Laterality Date   • CARDIAC SURGERY  02/27/2015    Heart Cath   • CORONARY ANGIOPLASTY WITH STENT PLACEMENT  02/27/2015    STACEY: proximal RCA   • TUMOR REMOVAL      from stomach     /81 (BP Location: Left arm, Patient Position: Sitting, Cuff Size: Adult)   Pulse 54   Temp 96.9 °F (36.1 °C)  "(Infrared)   Ht 160 cm (63\")   Wt 75.8 kg (167 lb)   LMP  (LMP Unknown)   SpO2 96%   BMI 29.58 kg/m²   Family History   Problem Relation Age of Onset   • Breast cancer Mother    • Lung cancer Father    • Heart disease Sister    • Diabetes Sister    • Stroke Sister    • Heart attack Sister    • Diabetes Paternal Grandfather        Current Outpatient Medications:   •  albuterol sulfate  (90 Base) MCG/ACT inhaler, Inhale 2 puffs Every 4 (Four) Hours As Needed for Wheezing or Shortness of Air., Disp: 6.7 g, Rfl: 2  •  aspirin 81 MG EC tablet, Take 81 mg by mouth 2 (two) times a day., Disp: , Rfl:   •  atorvastatin (LIPITOR) 40 MG tablet, TAKE 1 TABLET BY MOUTH DAILY, Disp: 90 tablet, Rfl: 1  •  Fluticasone Furoate-Vilanterol (Breo Ellipta) 200-25 MCG/INH inhaler, Inhale 1 puff Daily., Disp: 28 each, Rfl: 12  •  latanoprost (XALATAN) 0.005 % ophthalmic solution, PLACE 1 GTT IN OU QPM, Disp: , Rfl:   •  metoprolol tartrate (LOPRESSOR) 25 MG tablet, TAKE 1 TABLET BY MOUTH TWICE DAILY, Disp: 180 tablet, Rfl: 0  •  fluticasone (FLONASE) 50 MCG/ACT nasal spray, USE 2 SPRAYS IN EACH NOSTRIL DAILY AS DIRECTED BY PROVIDER., Disp: 48 g, Rfl: 2    Current Facility-Administered Medications:   •  albuterol (ACCUNEB) nebulizer solution 0.63 mg, 0.63 mg, Nebulization, Q6H PRN, Chika Islas MD  Social History     Socioeconomic History   • Marital status:      Spouse name: Not on file   • Number of children: Not on file   • Years of education: Not on file   • Highest education level: Not on file   Tobacco Use   • Smoking status: Current Every Day Smoker     Packs/day: 0.50     Types: Cigarettes     Start date: 1973   • Smokeless tobacco: Never Used   Vaping Use   • Vaping Use: Never used   Substance and Sexual Activity   • Alcohol use: No   • Drug use: No   • Sexual activity: Defer     Allergies   Allergen Reactions   • Penicillins Rash     Review of Systems   Constitutional: Negative for fever and " malaise/fatigue.   HENT: Negative for congestion and hearing loss.    Eyes: Negative for double vision and visual disturbance.   Cardiovascular: Negative for chest pain, claudication, dyspnea on exertion, leg swelling and syncope.   Respiratory: Negative for cough and shortness of breath.    Endocrine: Negative for cold intolerance.   Skin: Negative for color change and rash.   Musculoskeletal: Negative for arthritis and joint pain.   Gastrointestinal: Negative for abdominal pain and heartburn.   Genitourinary: Negative for hematuria.   Neurological: Negative for excessive daytime sleepiness and dizziness.   Psychiatric/Behavioral: Negative for depression. The patient is not nervous/anxious.    All other systems reviewed and are negative.             Objective:     Physical Exam  Vitals reviewed neck no JVP elevation lungs bilateral and mostly clear heart sounds S1 is regular no significant murmur gallop extremities no edema bilateral pulses present equal  Procedures    Lab Review:       Assessment:          Diagnosis Plan   1. Chronic coronary artery disease  CK    Comprehensive Metabolic Panel    Lipid Panel   2. Essential hypertension  CK    Comprehensive Metabolic Panel    Lipid Panel   3. Tobacco use  CK    Comprehensive Metabolic Panel    Lipid Panel   4. Dyslipidemia  CK    Comprehensive Metabolic Panel    Lipid Panel          Plan:       MDM  Number of Diagnoses or Management Options  Chronic coronary artery disease: established, improving  Dyslipidemia: established, improving  Essential hypertension: established, improving  Tobacco use: established, worsening     Amount and/or Complexity of Data Reviewed  Clinical lab tests: ordered and reviewed  Review and summarize past medical records: yes    Risk of Complications, Morbidity, and/or Mortality  Presenting problems: moderate  Management options: moderate

## 2021-06-01 RX ORDER — ATORVASTATIN CALCIUM 40 MG/1
TABLET, FILM COATED ORAL
Qty: 90 TABLET | Refills: 3 | Status: SHIPPED | OUTPATIENT
Start: 2021-06-01 | End: 2022-05-23 | Stop reason: SDUPTHER

## 2021-06-14 ENCOUNTER — TELEPHONE (OUTPATIENT)
Dept: FAMILY MEDICINE CLINIC | Facility: CLINIC | Age: 76
End: 2021-06-14

## 2021-06-14 NOTE — TELEPHONE ENCOUNTER
Caller: Alfreda Gruber    Relationship to patient: Self    Best call back number: [phone]    Chief complaint: PUNCTURED FOOT    Patient directed to call 911 or go to their nearest emergency room.     Patient verbalized understanding: [x] Yes  [] No  If no, why?    Additional notes: PATIENT SAID THAT SHE STEPPED ON SOMETHING AND PUNCTURED HER FOOT. REFERRED HER TO EMERGENCY ROOM. PATIENT UNDERSTOOD.

## 2021-07-16 ENCOUNTER — OFFICE VISIT (OUTPATIENT)
Dept: FAMILY MEDICINE CLINIC | Facility: CLINIC | Age: 76
End: 2021-07-16

## 2021-07-16 VITALS
OXYGEN SATURATION: 94 % | HEART RATE: 59 BPM | TEMPERATURE: 98.2 F | HEIGHT: 63 IN | DIASTOLIC BLOOD PRESSURE: 71 MMHG | WEIGHT: 163 LBS | BODY MASS INDEX: 28.88 KG/M2 | SYSTOLIC BLOOD PRESSURE: 138 MMHG

## 2021-07-16 DIAGNOSIS — H61.22 IMPACTED CERUMEN OF LEFT EAR: Primary | ICD-10-CM

## 2021-07-16 DIAGNOSIS — H91.93 BILATERAL HEARING LOSS, UNSPECIFIED HEARING LOSS TYPE: ICD-10-CM

## 2021-07-16 PROCEDURE — 69210 REMOVE IMPACTED EAR WAX UNI: CPT | Performed by: FAMILY MEDICINE

## 2021-07-16 PROCEDURE — 99213 OFFICE O/P EST LOW 20 MIN: CPT | Performed by: FAMILY MEDICINE

## 2021-07-16 NOTE — PROGRESS NOTES
Subjective   Alfreda Gruber is a 75 y.o. female.     She comes in today with complaints that her ears are clogged and feel full last 3 days  She has been unable to hear well  She has hearing aids that she bought online that she usually uses  She tried putting some peroxide in her ears that seem to help a little bit       The following portions of the patient's history were reviewed and updated as appropriate: allergies, current medications, past family history, past medical history, past social history, past surgical history, and problem list.  Past Medical History:   Diagnosis Date   • Acute coronary syndrome (CMS/[facility]) 2015   • CAD (coronary artery disease) 02/27/2015   • Cardiomyopathy (CMS/[facility])    • COPD (chronic obstructive pulmonary disease) (CMS/[facility])    • Dyspnea    • GERD (gastroesophageal reflux disease)    • Glaucoma    • Hx of non-ST elevation myocardial infarction (NSTEMI) 2015     Past Surgical History:   Procedure Laterality Date   • CARDIAC SURGERY  02/27/2015    Heart Cath   • CORONARY ANGIOPLASTY WITH STENT PLACEMENT  02/27/2015    STACEY: proximal RCA   • TUMOR REMOVAL      from stomach     Family History   Problem Relation Age of Onset   • Breast cancer Mother    • Lung cancer Father    • Heart disease Sister    • Diabetes Sister    • Stroke Sister    • Heart attack Sister    • Diabetes Paternal Grandfather      Social History     Socioeconomic History   • Marital status:      Spouse name: Not on file   • Number of children: Not on file   • Years of education: Not on file   • Highest education level: Not on file   Tobacco Use   • Smoking status: Current Every Day Smoker     Packs/day: 0.50     Types: Cigarettes     Start date: 1973   • Smokeless tobacco: Never Used   Vaping Use   • Vaping Use: Never used   Substance and Sexual Activity   • Alcohol use: No   • Drug use: No   • Sexual activity: Defer         Current Outpatient Medications:   •  albuterol sulfate  (90 Base) MCG/ACT  "inhaler, Inhale 2 puffs Every 4 (Four) Hours As Needed for Wheezing or Shortness of Air., Disp: 6.7 g, Rfl: 2  •  aspirin 81 MG EC tablet, Take 81 mg by mouth 2 (two) times a day., Disp: , Rfl:   •  atorvastatin (LIPITOR) 40 MG tablet, TAKE 1 TABLET BY MOUTH DAILY, Disp: 90 tablet, Rfl: 3  •  fluticasone (FLONASE) 50 MCG/ACT nasal spray, USE 2 SPRAYS IN EACH NOSTRIL DAILY AS DIRECTED BY PROVIDER., Disp: 48 g, Rfl: 2  •  Fluticasone Furoate-Vilanterol (Breo Ellipta) 200-25 MCG/INH inhaler, Inhale 1 puff Daily., Disp: 28 each, Rfl: 12  •  latanoprost (XALATAN) 0.005 % ophthalmic solution, PLACE 1 GTT IN OU QPM, Disp: , Rfl:   •  metoprolol tartrate (LOPRESSOR) 25 MG tablet, TAKE 1 TABLET BY MOUTH TWICE DAILY, Disp: 180 tablet, Rfl: 0    Current Facility-Administered Medications:   •  albuterol (ACCUNEB) nebulizer solution 0.63 mg, 0.63 mg, Nebulization, Q6H PRN, Chika Islas MD    Review of Systems   Constitutional: Negative.    HENT: Positive for hearing loss. Negative for congestion, ear discharge and ear pain.    Respiratory: Negative.    Cardiovascular: Negative.    Neurological: Negative for dizziness and headache.     /71 (BP Location: Left arm, Patient Position: Sitting, Cuff Size: Large Adult)   Pulse 59   Temp 98.2 °F (36.8 °C) (Temporal)   Ht 160 cm (63\")   Wt 73.9 kg (163 lb)   LMP  (LMP Unknown)   SpO2 94%   Breastfeeding No   BMI 28.87 kg/m²       Objective   Physical Exam  Vitals and nursing note reviewed.   Constitutional:       General: She is not in acute distress.     Appearance: She is well-developed.   HENT:      Head: Normocephalic and atraumatic.      Right Ear: Tympanic membrane, ear canal and external ear normal. Decreased hearing noted.      Left Ear: Tympanic membrane and external ear normal. Decreased hearing noted. There is impacted cerumen.      Nose:      Right Sinus: No maxillary sinus tenderness or frontal sinus tenderness.      Left Sinus: No maxillary sinus " tenderness or frontal sinus tenderness.      Mouth/Throat:      Mouth: Mucous membranes are moist.      Pharynx: Oropharynx is clear.   Cardiovascular:      Rate and Rhythm: Normal rate and regular rhythm.      Heart sounds: Normal heart sounds. No murmur heard.   No friction rub. No gallop.    Pulmonary:      Effort: Pulmonary effort is normal. No respiratory distress.      Breath sounds: Normal breath sounds. No wheezing or rales.   Musculoskeletal:      Right lower leg: No edema.      Left lower leg: No edema.   Skin:     General: Skin is warm and dry.   Neurological:      Mental Status: She is alert.     Ear Cerumen Removal    Date/Time: 7/16/2021 3:14 PM  Performed by: Chika Islas MD  Authorized by: Chika Islas MD     Anesthesia:  Local Anesthetic: none  Location details: left ear  Patient tolerance: patient tolerated the procedure well with no immediate complications  Comments: Curette was used to remove the wax that was impacted patient had immediate improvement in her hearing on the left    Procedure type: instrumentation   Sedation:  Patient sedated: no                Assessment/Plan   Problems Addressed this Visit        ENT    Hearing loss      Other Visit Diagnoses     Impacted cerumen of left ear    -  Primary      Diagnoses       Codes Comments    Impacted cerumen of left ear    -  Primary ICD-10-CM: H61.22  ICD-9-CM: 380.4     Bilateral hearing loss, unspecified hearing loss type     ICD-10-CM: H91.93  ICD-9-CM: 389.9           Dramatic improvement in her hearing after the wax was cleared  She has seen an ENT your audiologist and needs prescription hearing aids but she is unable to afford them at this time  She will continue to use her over-the-counter hearing aids and will use an over-the-counter earwax removal kit or drops to help with the wax

## 2021-07-20 ENCOUNTER — OFFICE VISIT (OUTPATIENT)
Dept: FAMILY MEDICINE CLINIC | Facility: CLINIC | Age: 76
End: 2021-07-20

## 2021-07-20 VITALS
WEIGHT: 161 LBS | SYSTOLIC BLOOD PRESSURE: 144 MMHG | OXYGEN SATURATION: 94 % | TEMPERATURE: 98 F | HEART RATE: 50 BPM | HEIGHT: 63 IN | BODY MASS INDEX: 28.53 KG/M2 | DIASTOLIC BLOOD PRESSURE: 68 MMHG

## 2021-07-20 DIAGNOSIS — H61.22 IMPACTED CERUMEN OF LEFT EAR: ICD-10-CM

## 2021-07-20 DIAGNOSIS — H91.93 BILATERAL HEARING LOSS, UNSPECIFIED HEARING LOSS TYPE: Primary | ICD-10-CM

## 2021-07-20 DIAGNOSIS — H92.03 OTALGIA, BILATERAL: ICD-10-CM

## 2021-07-20 PROCEDURE — 69210 REMOVE IMPACTED EAR WAX UNI: CPT | Performed by: FAMILY MEDICINE

## 2021-07-20 PROCEDURE — 99212 OFFICE O/P EST SF 10 MIN: CPT | Performed by: FAMILY MEDICINE

## 2021-07-20 NOTE — PROGRESS NOTES
Subjective   Alfreda Gruber is a 75 y.o. female.     She comes in today with complaints that her ear is not feeling better  She has been using some over-the-counter drops to help with earwax but feels like her ears are full  She was seen here in the office on the 16th and had earwax removed on the left       The following portions of the patient's history were reviewed and updated as appropriate: allergies, current medications, past family history, past medical history, past social history, past surgical history, and problem list.  Past Medical History:   Diagnosis Date   • Acute coronary syndrome (CMS/[facility]) 2015   • CAD (coronary artery disease) 02/27/2015   • Cardiomyopathy (CMS/[facility])    • COPD (chronic obstructive pulmonary disease) (CMS/[facility])    • Dyspnea    • GERD (gastroesophageal reflux disease)    • Glaucoma    • Hx of non-ST elevation myocardial infarction (NSTEMI) 2015     Past Surgical History:   Procedure Laterality Date   • CARDIAC SURGERY  02/27/2015    Heart Cath   • CORONARY ANGIOPLASTY WITH STENT PLACEMENT  02/27/2015    STACEY: proximal RCA   • TUMOR REMOVAL      from stomach     Family History   Problem Relation Age of Onset   • Breast cancer Mother    • Lung cancer Father    • Heart disease Sister    • Diabetes Sister    • Stroke Sister    • Heart attack Sister    • Diabetes Paternal Grandfather      Social History     Socioeconomic History   • Marital status:      Spouse name: Not on file   • Number of children: Not on file   • Years of education: Not on file   • Highest education level: Not on file   Tobacco Use   • Smoking status: Current Every Day Smoker     Packs/day: 0.50     Types: Cigarettes     Start date: 1973   • Smokeless tobacco: Never Used   Vaping Use   • Vaping Use: Never used   Substance and Sexual Activity   • Alcohol use: No   • Drug use: No   • Sexual activity: Defer         Current Outpatient Medications:   •  albuterol sulfate  (90 Base) MCG/ACT inhaler, Inhale 2  "puffs Every 4 (Four) Hours As Needed for Wheezing or Shortness of Air., Disp: 6.7 g, Rfl: 2  •  aspirin 81 MG EC tablet, Take 81 mg by mouth 2 (two) times a day., Disp: , Rfl:   •  atorvastatin (LIPITOR) 40 MG tablet, TAKE 1 TABLET BY MOUTH DAILY, Disp: 90 tablet, Rfl: 3  •  fluticasone (FLONASE) 50 MCG/ACT nasal spray, USE 2 SPRAYS IN EACH NOSTRIL DAILY AS DIRECTED BY PROVIDER., Disp: 48 g, Rfl: 2  •  Fluticasone Furoate-Vilanterol (Breo Ellipta) 200-25 MCG/INH inhaler, Inhale 1 puff Daily., Disp: 28 each, Rfl: 12  •  latanoprost (XALATAN) 0.005 % ophthalmic solution, PLACE 1 GTT IN OU QPM, Disp: , Rfl:   •  metoprolol tartrate (LOPRESSOR) 25 MG tablet, TAKE 1 TABLET BY MOUTH TWICE DAILY, Disp: 180 tablet, Rfl: 0    Current Facility-Administered Medications:   •  albuterol (ACCUNEB) nebulizer solution 0.63 mg, 0.63 mg, Nebulization, Q6H PRN, Chika Islas MD    Review of Systems   Constitutional: Negative.    HENT: Positive for ear pain and hearing loss. Negative for ear discharge and tinnitus.    Neurological: Negative for dizziness.     /68 (BP Location: Left arm, Patient Position: Sitting, Cuff Size: Adult)   Pulse 50   Temp 98 °F (36.7 °C) (Temporal)   Ht 160 cm (63\")   Wt 73 kg (161 lb)   LMP  (LMP Unknown)   SpO2 94%   Breastfeeding No   BMI 28.52 kg/m²       Objective   Physical Exam  Vitals and nursing note reviewed.   Constitutional:       Appearance: Normal appearance.   HENT:      Head: Normocephalic and atraumatic.      Right Ear: Ear canal and external ear normal. Decreased hearing noted. No drainage or swelling. Tympanic membrane is scarred.      Left Ear: Tympanic membrane and external ear normal. Decreased hearing noted. No drainage or swelling. There is impacted cerumen.   Skin:     Findings: No erythema.   Neurological:      Mental Status: She is alert.     Ear Cerumen Removal    Date/Time: 7/20/2021 1:25 PM  Performed by: Chika Islas MD  Authorized by: " Chika Islas MD     Anesthesia:  Local Anesthetic: none  Location details: left ear  Patient tolerance: patient tolerated the procedure well with no immediate complications  Comments: Cerumen on the left was completely cleared using a curette and patient tolerated it well  Procedure type: instrumentation   Sedation:  Patient sedated: no                Assessment/Plan   Problems Addressed this Visit        ENT    Hearing loss - Primary    Relevant Orders    Ambulatory Referral to ENT (Otolaryngology)      Other Visit Diagnoses     Impacted cerumen of left ear        Relevant Orders    Ambulatory Referral to ENT (Otolaryngology)    Otalgia, bilateral        Relevant Orders    Ambulatory Referral to ENT (Otolaryngology)      Diagnoses       Codes Comments    Bilateral hearing loss, unspecified hearing loss type    -  Primary ICD-10-CM: H91.93  ICD-9-CM: 389.9     Impacted cerumen of left ear     ICD-10-CM: H61.22  ICD-9-CM: 380.4     Otalgia, bilateral     ICD-10-CM: H92.03  ICD-9-CM: 388.70         Cerumen impactions cleared  Hearing loss is significant  She will continue to wear her hearing aids and I will get her into see an ENT for further evaluation of her ears and her hearing loss

## 2021-10-11 ENCOUNTER — LAB (OUTPATIENT)
Dept: LAB | Facility: HOSPITAL | Age: 76
End: 2021-10-11

## 2021-10-11 ENCOUNTER — TRANSCRIBE ORDERS (OUTPATIENT)
Dept: ADMINISTRATIVE | Facility: HOSPITAL | Age: 76
End: 2021-10-11

## 2021-10-11 DIAGNOSIS — E78.5 DYSLIPIDEMIA: ICD-10-CM

## 2021-10-11 DIAGNOSIS — I10 HYPERTENSION, ESSENTIAL: ICD-10-CM

## 2021-10-11 DIAGNOSIS — I25.10 CHRONIC CORONARY ARTERY DISEASE: ICD-10-CM

## 2021-10-11 DIAGNOSIS — I10 ESSENTIAL HYPERTENSION: ICD-10-CM

## 2021-10-11 DIAGNOSIS — Z72.0 TOBACCO USE: ICD-10-CM

## 2021-10-11 DIAGNOSIS — Z72.0 TOBACCO USER: ICD-10-CM

## 2021-10-11 DIAGNOSIS — I25.10 CHRONIC CORONARY ARTERY DISEASE: Primary | ICD-10-CM

## 2021-10-11 LAB
ALBUMIN SERPL-MCNC: 4.4 G/DL (ref 3.5–5.2)
ALBUMIN/GLOB SERPL: 1.9 G/DL
ALP SERPL-CCNC: 127 U/L (ref 39–117)
ALT SERPL W P-5'-P-CCNC: 13 U/L (ref 1–33)
ANION GAP SERPL CALCULATED.3IONS-SCNC: 8.5 MMOL/L (ref 5–15)
AST SERPL-CCNC: 17 U/L (ref 1–32)
BILIRUB SERPL-MCNC: 0.5 MG/DL (ref 0–1.2)
BUN SERPL-MCNC: 17 MG/DL (ref 8–23)
BUN/CREAT SERPL: 20.5 (ref 7–25)
CALCIUM SPEC-SCNC: 9.3 MG/DL (ref 8.6–10.5)
CHLORIDE SERPL-SCNC: 102 MMOL/L (ref 98–107)
CHOLEST SERPL-MCNC: 123 MG/DL (ref 0–200)
CK SERPL-CCNC: 76 U/L (ref 20–180)
CO2 SERPL-SCNC: 27.5 MMOL/L (ref 22–29)
CREAT SERPL-MCNC: 0.83 MG/DL (ref 0.57–1)
GFR SERPL CREATININE-BSD FRML MDRD: 66 ML/MIN/1.73
GLOBULIN UR ELPH-MCNC: 2.3 GM/DL
GLUCOSE SERPL-MCNC: 94 MG/DL (ref 65–99)
HDLC SERPL-MCNC: 50 MG/DL (ref 40–60)
LDLC SERPL CALC-MCNC: 61 MG/DL (ref 0–100)
LDLC/HDLC SERPL: 1.24 {RATIO}
POTASSIUM SERPL-SCNC: 4.7 MMOL/L (ref 3.5–5.2)
PROT SERPL-MCNC: 6.7 G/DL (ref 6–8.5)
SODIUM SERPL-SCNC: 138 MMOL/L (ref 136–145)
TRIGL SERPL-MCNC: 56 MG/DL (ref 0–150)
VLDLC SERPL-MCNC: 12 MG/DL (ref 5–40)

## 2021-10-11 PROCEDURE — 80061 LIPID PANEL: CPT

## 2021-10-11 PROCEDURE — 80053 COMPREHEN METABOLIC PANEL: CPT

## 2021-10-11 PROCEDURE — 36415 COLL VENOUS BLD VENIPUNCTURE: CPT

## 2021-10-11 PROCEDURE — 82550 ASSAY OF CK (CPK): CPT

## 2021-10-11 NOTE — TELEPHONE ENCOUNTER
Rx Refill Note  Requested Prescriptions      No prescriptions requested or ordered in this encounter      Last office visit with prescribing clinician: 4/14/2021      Next office visit with prescribing clinician: 10/15/2021            Raina Coronado MA  10/11/21, 11:47 EDT

## 2021-10-15 ENCOUNTER — OFFICE VISIT (OUTPATIENT)
Dept: CARDIOLOGY | Facility: CLINIC | Age: 76
End: 2021-10-15

## 2021-10-15 VITALS
HEART RATE: 58 BPM | OXYGEN SATURATION: 94 % | BODY MASS INDEX: 28.88 KG/M2 | WEIGHT: 163 LBS | SYSTOLIC BLOOD PRESSURE: 131 MMHG | HEIGHT: 63 IN | DIASTOLIC BLOOD PRESSURE: 74 MMHG

## 2021-10-15 DIAGNOSIS — Z98.61 STATUS POST PERCUTANEOUS TRANSLUMINAL CORONARY ANGIOPLASTY: ICD-10-CM

## 2021-10-15 DIAGNOSIS — I25.10 CHRONIC CORONARY ARTERY DISEASE: Primary | ICD-10-CM

## 2021-10-15 DIAGNOSIS — J44.9 CHRONIC OBSTRUCTIVE PULMONARY DISEASE, UNSPECIFIED COPD TYPE (HCC): ICD-10-CM

## 2021-10-15 DIAGNOSIS — E78.5 DYSLIPIDEMIA: ICD-10-CM

## 2021-10-15 DIAGNOSIS — Z72.0 TOBACCO USE: ICD-10-CM

## 2021-10-15 PROCEDURE — 99213 OFFICE O/P EST LOW 20 MIN: CPT | Performed by: INTERNAL MEDICINE

## 2021-10-15 PROCEDURE — 93000 ELECTROCARDIOGRAM COMPLETE: CPT | Performed by: INTERNAL MEDICINE

## 2021-10-15 NOTE — PROGRESS NOTES
HP      Name: Alfreda Gruber ADMIT: (Not on file)   : 1945  PCP: Chika Islas MD    MRN: [id number] LOS: 0 days   AGE/SEX: 76 y.o. female  ROOM: Room/bed info not found     Chief Complaint   Patient presents with   • Coronary Artery Disease       Subjective         History of present illness  Alfreda Gruber is a 76-year-old female patient with history of coronary artery disease status post PCI due to myocardial infarction in , also has COPD.  Patient has been doing well denies any active chest pain no significant shortness of breath no lower extremity edema no palpitations no syncopal episodes.  No recent hospitalizations.    Past Medical History:   Diagnosis Date   • Acute coronary syndrome ([facility])    • CAD (coronary artery disease) 2015   • Cardiomyopathy ([facility])    • COPD (chronic obstructive pulmonary disease) ([facility])    • Dyspnea    • GERD (gastroesophageal reflux disease)    • Glaucoma    • Hx of non-ST elevation myocardial infarction (NSTEMI)      Past Surgical History:   Procedure Laterality Date   • CARDIAC SURGERY  2015    Heart Cath   • CORONARY ANGIOPLASTY WITH STENT PLACEMENT  2015    STACEY: proximal RCA   • TUMOR REMOVAL      from stomach     Family History   Problem Relation Age of Onset   • Breast cancer Mother    • Lung cancer Father    • Heart disease Sister    • Diabetes Sister    • Stroke Sister    • Heart attack Sister    • Diabetes Paternal Grandfather      Social History     Tobacco Use   • Smoking status: Current Every Day Smoker     Packs/day: 0.50     Types: Cigarettes     Start date:    • Smokeless tobacco: Never Used   Vaping Use   • Vaping Use: Never used   Substance Use Topics   • Alcohol use: No   • Drug use: No     (Not in a hospital admission)    Allergies:  Penicillins      Physical Exam  VITALS REVIEWED    General:      well developed, in no acute distress.    Head:      normocephalic and atraumatic.    Eyes:      PERRL/EOM  intact, conjunctiva and sclera clear with out nystagmus.    Neck:      no masses, thyromegaly,  trachea central with normal respiratory effort and PMI displaced laterally  Lungs:      Clear to auscultation bilaterally  Heart:       Regular rate and rhythm  Msk:      no deformity or scoliosis noted of thoracic or lumbar spine.    Pulses:      pulses normal in all 4 extremities.    Extremities:       No lower extremity edema  Neurologic:      no focal deficits.   alert oriented x3  Skin:      intact without lesions or rashes.    Psych:      alert and cooperative; normal mood and affect; normal attention span and concentration.      Result Review :                  ECG 12 Lead    Date/Time: 10/15/2021 2:59 PM  Performed by: Gomez Orlando MD  Authorized by: Gomez Orlando MD   Comparison: compared with previous ECG   Rhythm: sinus rhythm  Rate: normal  BPM: 55  Conduction: conduction normal  ST Segments: ST segments normal  QRS axis: normal  Other findings: non-specific ST-T wave changes            Assessment and Plan      Alfreda Gruber is a 76-year-old female patient who has history of coronary artery disease status post PCI, patient does not have any CHF symptoms or angina at this time.  She is on good therapy with aspirin Lipitor and metoprolol.  I will continue same therapy.  Today's EKG is unremarkable.  Her blood pressure is also well controlled.  Smoking cessation was advised during this visit.  I will see her in follow-up in 6 months.    Diagnoses and all orders for this visit:    1. Chronic coronary artery disease (Primary)    2. Chronic obstructive pulmonary disease, unspecified COPD type (HCC)    3. Status post percutaneous transluminal coronary angioplasty    4. Tobacco use    5. Dyslipidemia    Other orders  -     ECG 12 Lead           Return in about 6 months (around 4/15/2022).  Patient was given instructions and counseling regarding her condition or for health maintenance advice. Please see  specific information pulled into the AVS if appropriate.

## 2022-04-15 ENCOUNTER — OFFICE VISIT (OUTPATIENT)
Dept: CARDIOLOGY | Facility: CLINIC | Age: 77
End: 2022-04-15

## 2022-04-15 VITALS
HEIGHT: 63 IN | HEART RATE: 58 BPM | OXYGEN SATURATION: 95 % | SYSTOLIC BLOOD PRESSURE: 142 MMHG | BODY MASS INDEX: 29.23 KG/M2 | DIASTOLIC BLOOD PRESSURE: 70 MMHG | WEIGHT: 165 LBS

## 2022-04-15 DIAGNOSIS — Z72.0 TOBACCO USE: ICD-10-CM

## 2022-04-15 DIAGNOSIS — I25.10 CHRONIC CORONARY ARTERY DISEASE: Primary | ICD-10-CM

## 2022-04-15 DIAGNOSIS — I10 PRIMARY HYPERTENSION: ICD-10-CM

## 2022-04-15 PROCEDURE — 99213 OFFICE O/P EST LOW 20 MIN: CPT | Performed by: INTERNAL MEDICINE

## 2022-04-15 NOTE — PROGRESS NOTES
Progress note      Name: Alfreda Gruber ADMIT: (Not on file)   : 1945  PCP: Chika Islas MD    MRN: [id number] LOS: 0 days   AGE/SEX: 76 y.o. female  ROOM: Room/bed info not found     Chief Complaint   Patient presents with   • Coronary Artery Disease     6 MO F/U       Subjective       History of present illness  Alfreda Gruber is a 76-year-old female patient with history of coronary artery disease status post PCI due to myocardial infarction , also has COPD and ongoing smoking, here today for follow-up.  Patient is doing well denies having any chest pain, she does have chronic shortness of breath but unchanged, no lower extremity edema no palpitations no syncopal episodes.    Past Medical History:   Diagnosis Date   • Acute coronary syndrome (HCC)    • CAD (coronary artery disease) 2015   • Cardiomyopathy ([facility])    • COPD (chronic obstructive pulmonary disease) ([facility])    • Dyspnea    • GERD (gastroesophageal reflux disease)    • Glaucoma    • Hx of non-ST elevation myocardial infarction (NSTEMI)      Past Surgical History:   Procedure Laterality Date   • CARDIAC SURGERY  2015    Heart Cath   • CORONARY ANGIOPLASTY WITH STENT PLACEMENT  2015    STACEY: proximal RCA   • TUMOR REMOVAL      from stomach     Family History   Problem Relation Age of Onset   • Breast cancer Mother    • Lung cancer Father    • Heart disease Sister    • Diabetes Sister    • Stroke Sister    • Heart attack Sister    • Diabetes Paternal Grandfather      Social History     Tobacco Use   • Smoking status: Current Every Day Smoker     Packs/day: 0.50     Types: Cigarettes     Start date:    • Smokeless tobacco: Never Used   Vaping Use   • Vaping Use: Never used   Substance Use Topics   • Alcohol use: No   • Drug use: No     (Not in a hospital admission)    Allergies:  Penicillins      Physical Exam  VITALS REVIEWED    General:      well developed, in no acute distress.    Head:       normocephalic and atraumatic.    Eyes:      PERRL/EOM intact, conjunctiva and sclera clear with out nystagmus.    Neck:      no masses, thyromegaly,  trachea central with normal respiratory effort and PMI displaced laterally  Lungs:      Clear to auscultation bilaterally  Heart:       Regular rate and rhythm  Msk:      no deformity or scoliosis noted of thoracic or lumbar spine.    Pulses:      pulses normal in all 4 extremities.    Extremities:       No lower extremity edema.  Neurologic:      no focal deficits.   alert oriented x3  Skin:      intact without lesions or rashes.    Psych:      alert and cooperative; normal mood and affect; normal attention span and concentration.      Result Review :               Pertinent cardiac workup    1.  EKG 10/15/2021 sinus rhythm.      Procedures        Assessment and Plan      Alfreda Gruber is a 76-year-old female patient was coronary artery disease status post PCI, patient does not have any anginal symptoms or CHF symptoms at this time.  She is on good therapy with aspirin, metoprolol and Lipitor for her CAD.  We will continue same therapy for now, I will see her in follow-up in 8 months.  Smoking cessation was advised during this visit.    Diagnoses and all orders for this visit:    1. Chronic coronary artery disease (Primary)    2. Primary hypertension    3. Tobacco use           Return in about 8 months (around 12/15/2022).  Patient was given instructions and counseling regarding her condition or for health maintenance advice. Please see specific information pulled into the AVS if appropriate.

## 2022-05-23 RX ORDER — ATORVASTATIN CALCIUM 40 MG/1
40 TABLET, FILM COATED ORAL DAILY
Qty: 90 TABLET | Refills: 3 | Status: SHIPPED | OUTPATIENT
Start: 2022-05-23

## 2022-05-23 NOTE — TELEPHONE ENCOUNTER
Rx Refill Note  Requested Prescriptions      No prescriptions requested or ordered in this encounter      Last office visit with prescribing clinician: 4/15/2022      Next office visit with prescribing clinician: 12/16/2022            Raina Coronado MA  05/23/22, 11:24 EDT

## 2022-09-15 ENCOUNTER — OFFICE VISIT (OUTPATIENT)
Dept: FAMILY MEDICINE CLINIC | Facility: CLINIC | Age: 77
End: 2022-09-15

## 2022-09-15 VITALS
OXYGEN SATURATION: 95 % | HEART RATE: 64 BPM | WEIGHT: 170 LBS | DIASTOLIC BLOOD PRESSURE: 76 MMHG | SYSTOLIC BLOOD PRESSURE: 136 MMHG | BODY MASS INDEX: 30.12 KG/M2 | HEIGHT: 63 IN | TEMPERATURE: 97.3 F

## 2022-09-15 DIAGNOSIS — H69.83 DYSFUNCTION OF BOTH EUSTACHIAN TUBES: Primary | ICD-10-CM

## 2022-09-15 DIAGNOSIS — J44.9 CHRONIC OBSTRUCTIVE PULMONARY DISEASE, UNSPECIFIED COPD TYPE: ICD-10-CM

## 2022-09-15 PROCEDURE — 99213 OFFICE O/P EST LOW 20 MIN: CPT | Performed by: FAMILY MEDICINE

## 2022-09-15 RX ORDER — FLUTICASONE PROPIONATE 50 MCG
SPRAY, SUSPENSION (ML) NASAL
Qty: 48 G | OUTPATIENT
Start: 2022-09-15

## 2022-09-15 RX ORDER — ALBUTEROL SULFATE 90 UG/1
2 AEROSOL, METERED RESPIRATORY (INHALATION) EVERY 4 HOURS PRN
Qty: 6.7 G | Refills: 2 | Status: SHIPPED | OUTPATIENT
Start: 2022-09-15

## 2022-09-15 RX ORDER — FLUTICASONE PROPIONATE 50 MCG
2 SPRAY, SUSPENSION (ML) NASAL DAILY
Qty: 18.2 ML | Refills: 1 | Status: SHIPPED | OUTPATIENT
Start: 2022-09-15

## 2022-09-15 NOTE — PROGRESS NOTES
"Subjective   Alfreda Gruber is a 77 y.o. female.     History of Present Illness  Ears feel full (\"clogged up\")  Had covid last month and still feels congested  She has been out of her albuterol for a few days  She is still smoking       The following portions of the patient's history were reviewed and updated as appropriate: allergies, current medications, past family history, past medical history, past social history, past surgical history, and problem list.  Past Medical History:   Diagnosis Date   • Acute coronary syndrome ([facility]) 2015   • CAD (coronary artery disease) 02/27/2015   • Cardiomyopathy ([facility])    • COPD (chronic obstructive pulmonary disease) ([facility])    • Dyspnea    • GERD (gastroesophageal reflux disease)    • Glaucoma    • Hx of non-ST elevation myocardial infarction (NSTEMI) 2015     Past Surgical History:   Procedure Laterality Date   • CARDIAC SURGERY  02/27/2015    Heart Cath   • CORONARY ANGIOPLASTY WITH STENT PLACEMENT  02/27/2015    STACEY: proximal RCA   • TUMOR REMOVAL      from stomach     Family History   Problem Relation Age of Onset   • Breast cancer Mother    • Lung cancer Father    • Heart disease Sister    • Diabetes Sister    • Stroke Sister    • Heart attack Sister    • Diabetes Paternal Grandfather      Social History     Socioeconomic History   • Marital status:    Tobacco Use   • Smoking status: Current Every Day Smoker     Packs/day: 0.50     Types: Cigarettes     Start date: 1973   • Smokeless tobacco: Never Used   Vaping Use   • Vaping Use: Never used   Substance and Sexual Activity   • Alcohol use: No   • Drug use: No   • Sexual activity: Defer         Current Outpatient Medications:   •  albuterol sulfate  (90 Base) MCG/ACT inhaler, Inhale 2 puffs Every 4 (Four) Hours As Needed for Wheezing or Shortness of Air., Disp: 6.7 g, Rfl: 2  •  aspirin 81 MG EC tablet, Take 81 mg by mouth Daily., Disp: , Rfl:   •  atorvastatin (LIPITOR) 40 MG tablet, Take 1 tablet by mouth " "Daily., Disp: 90 tablet, Rfl: 3  •  latanoprost (XALATAN) 0.005 % ophthalmic solution, PLACE 1 GTT IN OU QPM, Disp: , Rfl:   •  metoprolol tartrate (LOPRESSOR) 25 MG tablet, Take 1 tablet by mouth 2 (Two) Times a Day., Disp: 180 tablet, Rfl: 2  •  fluticasone (Flonase) 50 MCG/ACT nasal spray, 2 sprays into the nostril(s) as directed by provider Daily., Disp: 18.2 mL, Rfl: 1  No current facility-administered medications for this visit.    Review of Systems   HENT: Positive for congestion and hearing loss.    Respiratory: Negative.    Cardiovascular: Negative.    Neurological: Negative for dizziness and headache.     /76 (BP Location: Left arm, Patient Position: Sitting, Cuff Size: Adult)   Pulse 64   Temp 97.3 °F (36.3 °C) (Temporal)   Ht 160 cm (63\")   Wt 77.1 kg (170 lb)   LMP  (LMP Unknown)   SpO2 95%   Breastfeeding No   BMI 30.11 kg/m²       Objective   Physical Exam  Vitals and nursing note reviewed.   Constitutional:       General: She is not in acute distress.     Appearance: She is well-developed.   HENT:      Head: Normocephalic and atraumatic.      Right Ear: Ear canal normal. Tympanic membrane is bulging.      Left Ear: Ear canal normal. Tympanic membrane is bulging.      Nose:      Right Sinus: No maxillary sinus tenderness or frontal sinus tenderness.      Left Sinus: No maxillary sinus tenderness or frontal sinus tenderness.   Cardiovascular:      Rate and Rhythm: Normal rate and regular rhythm.      Heart sounds: Normal heart sounds. No murmur heard.    No friction rub. No gallop.   Pulmonary:      Effort: Pulmonary effort is normal. No respiratory distress.      Breath sounds: Wheezing present. No rales.      Comments: Occasional expiratory wheeze  Musculoskeletal:      Cervical back: Neck supple.   Lymphadenopathy:      Cervical: No cervical adenopathy.   Skin:     General: Skin is warm and dry.   Neurological:      Mental Status: She is alert.           Assessment & Plan   Problems " Addressed this Visit        Pulmonary and Pneumonias    Chronic obstructive pulmonary disease (HCC)    Relevant Medications    albuterol sulfate  (90 Base) MCG/ACT inhaler    fluticasone (Flonase) 50 MCG/ACT nasal spray      Other Visit Diagnoses     Dysfunction of both eustachian tubes    -  Primary      Diagnoses       Codes Comments    Dysfunction of both eustachian tubes    -  Primary ICD-10-CM: H69.83  ICD-9-CM: 381.81     Chronic obstructive pulmonary disease, unspecified COPD type (HCC)     ICD-10-CM: J44.9  ICD-9-CM: 496           Eustachian tube dysfunction-I have put her on Flonase nasal spray and gave her instructions on its use  COPD I have refilled her albuterol and have counseled her on smoking cessation

## 2022-12-28 ENCOUNTER — OFFICE VISIT (OUTPATIENT)
Dept: CARDIOLOGY | Facility: CLINIC | Age: 77
End: 2022-12-28

## 2022-12-28 VITALS
WEIGHT: 163 LBS | OXYGEN SATURATION: 97 % | SYSTOLIC BLOOD PRESSURE: 144 MMHG | BODY MASS INDEX: 28.88 KG/M2 | HEIGHT: 63 IN | DIASTOLIC BLOOD PRESSURE: 70 MMHG | HEART RATE: 56 BPM

## 2022-12-28 DIAGNOSIS — J41.0 SIMPLE CHRONIC BRONCHITIS: ICD-10-CM

## 2022-12-28 DIAGNOSIS — E78.5 DYSLIPIDEMIA: ICD-10-CM

## 2022-12-28 DIAGNOSIS — I25.10 CHRONIC CORONARY ARTERY DISEASE: Primary | ICD-10-CM

## 2022-12-28 DIAGNOSIS — I10 PRIMARY HYPERTENSION: ICD-10-CM

## 2022-12-28 PROCEDURE — 99213 OFFICE O/P EST LOW 20 MIN: CPT | Performed by: INTERNAL MEDICINE

## 2022-12-28 PROCEDURE — 93000 ELECTROCARDIOGRAM COMPLETE: CPT | Performed by: INTERNAL MEDICINE

## 2022-12-28 NOTE — PROGRESS NOTES
Progress note      Name: Alfreda Gruber ADMIT: (Not on file)   : 1945  PCP: Chika Islas MD    MRN: [id number] LOS: 0 days   AGE/SEX: 77 y.o. female  ROOM: Room/bed info not found     Chief Complaint   Patient presents with   • Follow-up     8 mo   • Coronary Artery Disease       Subjective       History of present illness  Alfreda Gruber is a 77-year-old female patient with history of coronary artery disease status post PCI due to myocardial infarction , also has COPD and ongoing smoking, here today for follow-up.  Patient is doing well denies having any chest pain, she does have chronic shortness of breath but unchanged, no lower extremity edema no palpitations no syncopal episodes.    Past Medical History:   Diagnosis Date   • Acute coronary syndrome ([facility])    • CAD (coronary artery disease) 2015   • Cardiomyopathy ([facility])    • COPD (chronic obstructive pulmonary disease) ([facility])    • COVID 2022   • Dyspnea    • GERD (gastroesophageal reflux disease)    • Glaucoma    • Hx of non-ST elevation myocardial infarction (NSTEMI)      Past Surgical History:   Procedure Laterality Date   • CARDIAC SURGERY  2015    Heart Cath   • CORONARY ANGIOPLASTY WITH STENT PLACEMENT  2015    STACEY: proximal RCA   • TUMOR REMOVAL      from stomach     Family History   Problem Relation Age of Onset   • Breast cancer Mother    • Lung cancer Father    • Heart disease Sister    • Diabetes Sister    • Stroke Sister    • Heart attack Sister    • Diabetes Paternal Grandfather      Social History     Tobacco Use   • Smoking status: Every Day     Packs/day: 0.50     Types: Cigarettes     Start date:    • Smokeless tobacco: Never   Vaping Use   • Vaping Use: Never used   Substance Use Topics   • Alcohol use: No   • Drug use: No     (Not in a hospital admission)    Allergies:  Penicillins      Physical Exam  VITALS REVIEWED    General:      well developed, in no acute distress.    Head:       normocephalic and atraumatic.    Eyes:      PERRL/EOM intact, conjunctiva and sclera clear with out nystagmus.    Neck:      no masses, thyromegaly,  trachea central with normal respiratory effort and PMI displaced laterally  Lungs:      Clear to auscultation bilaterally  Heart:       Regular rate and rhythm  Msk:      no deformity or scoliosis noted of thoracic or lumbar spine.    Pulses:      pulses normal in all 4 extremities.    Extremities:       No lower extremity edema  Neurologic:      no focal deficits.   alert oriented x3  Skin:      intact without lesions or rashes.    Psych:      alert and cooperative; normal mood and affect; normal attention span and concentration.      Result Review :                     ECG 12 Lead    Date/Time: 12/28/2022 11:30 AM  Performed by: Gomez Orlando MD  Authorized by: Gomez Orlando MD   Comparison: compared with previous ECG   Similar to previous ECG  Rhythm: sinus bradycardia  Rate: normal  BPM: 51  Conduction: conduction normal  ST Segments: ST segments normal  T Waves: T waves normal  QRS axis: normal                  Assessment and Plan      Alfreda Gruber  is a 77-year-old female patient was coronary artery disease status post PCI, patient does not have any anginal symptoms or CHF symptoms at this time.  She is on good therapy with aspirin, metoprolol and Lipitor for her CAD.  We will continue same therapy for now, I will see her in follow-up in 8 months.  Smoking cessation was advised during this visit.    Diagnoses and all orders for this visit:    1. Chronic coronary artery disease (Primary)    2. Simple chronic bronchitis (HCC)    3. Primary hypertension    4. Dyslipidemia           Return in about 8 months (around 8/28/2023).  Patient was given instructions and counseling regarding her condition or for health maintenance advice. Please see specific information pulled into the AVS if appropriate.

## 2023-04-05 NOTE — TELEPHONE ENCOUNTER
Rx Refill Note  Requested Prescriptions     Pending Prescriptions Disp Refills   • metoprolol tartrate (LOPRESSOR) 25 MG tablet [Pharmacy Med Name: METOPROLOL TARTRATE 25MG TABLETS] 180 tablet 2     Sig: TAKE 1 TABLET BY MOUTH TWICE DAILY      Last office visit with prescribing clinician: 12/28/2022   Last telemedicine visit with prescribing clinician: 8/29/2023   Next office visit with prescribing clinician: 8/29/2023                         Would you like a call back once the refill request has been completed: [] Yes [] No    If the office needs to give you a call back, can they leave a voicemail: [] Yes [] No    Melyssa Delgado MA  04/05/23, 12:56 EDT

## 2023-04-15 ENCOUNTER — APPOINTMENT (OUTPATIENT)
Dept: MRI IMAGING | Facility: HOSPITAL | Age: 78
End: 2023-04-15
Payer: MEDICARE

## 2023-04-15 ENCOUNTER — HOSPITAL ENCOUNTER (OUTPATIENT)
Facility: HOSPITAL | Age: 78
Setting detail: OBSERVATION
Discharge: HOME | End: 2023-04-16
Attending: EMERGENCY MEDICINE | Admitting: STUDENT IN AN ORGANIZED HEALTH CARE EDUCATION/TRAINING PROGRAM
Payer: MEDICARE

## 2023-04-15 ENCOUNTER — APPOINTMENT (OUTPATIENT)
Dept: GENERAL RADIOLOGY | Facility: HOSPITAL | Age: 78
End: 2023-04-15
Payer: MEDICARE

## 2023-04-15 ENCOUNTER — APPOINTMENT (OUTPATIENT)
Dept: CT IMAGING | Facility: HOSPITAL | Age: 78
End: 2023-04-15
Payer: MEDICARE

## 2023-04-15 DIAGNOSIS — R27.0 ATAXIA: ICD-10-CM

## 2023-04-15 DIAGNOSIS — R42 VERTIGO: Primary | ICD-10-CM

## 2023-04-15 LAB
ALBUMIN SERPL-MCNC: 4.4 G/DL (ref 3.5–5.2)
ALBUMIN/GLOB SERPL: 1.5 G/DL
ALP SERPL-CCNC: 152 U/L (ref 39–117)
ALT SERPL W P-5'-P-CCNC: 13 U/L (ref 1–33)
ANION GAP SERPL CALCULATED.3IONS-SCNC: 11 MMOL/L (ref 5–15)
APTT PPP: 27.3 SECONDS (ref 61–76.5)
AST SERPL-CCNC: 17 U/L (ref 1–32)
BASOPHILS # BLD AUTO: 0 10*3/MM3 (ref 0–0.2)
BASOPHILS NFR BLD AUTO: 0.5 % (ref 0–1.5)
BILIRUB SERPL-MCNC: 0.6 MG/DL (ref 0–1.2)
BUN SERPL-MCNC: 16 MG/DL (ref 8–23)
BUN/CREAT SERPL: 20.8 (ref 7–25)
CALCIUM SPEC-SCNC: 9.7 MG/DL (ref 8.6–10.5)
CHLORIDE SERPL-SCNC: 102 MMOL/L (ref 98–107)
CO2 SERPL-SCNC: 25 MMOL/L (ref 22–29)
CREAT SERPL-MCNC: 0.77 MG/DL (ref 0.57–1)
DEPRECATED RDW RBC AUTO: 47.3 FL (ref 37–54)
EGFRCR SERPLBLD CKD-EPI 2021: 78.6 ML/MIN/1.73
EOSINOPHIL # BLD AUTO: 0 10*3/MM3 (ref 0–0.4)
EOSINOPHIL NFR BLD AUTO: 0.5 % (ref 0.3–6.2)
ERYTHROCYTE [DISTWIDTH] IN BLOOD BY AUTOMATED COUNT: 14.2 % (ref 12.3–15.4)
GEN 5 2HR TROPONIN T REFLEX: 10 NG/L
GLOBULIN UR ELPH-MCNC: 2.9 GM/DL
GLUCOSE BLDC GLUCOMTR-MCNC: 113 MG/DL (ref 70–105)
GLUCOSE SERPL-MCNC: 106 MG/DL (ref 65–99)
HCT VFR BLD AUTO: 46.1 % (ref 34–46.6)
HGB BLD-MCNC: 15.7 G/DL (ref 12–15.9)
INR PPP: 1.01 (ref 0.93–1.1)
LYMPHOCYTES # BLD AUTO: 1.3 10*3/MM3 (ref 0.7–3.1)
LYMPHOCYTES NFR BLD AUTO: 14.9 % (ref 19.6–45.3)
MCH RBC QN AUTO: 31 PG (ref 26.6–33)
MCHC RBC AUTO-ENTMCNC: 34 G/DL (ref 31.5–35.7)
MCV RBC AUTO: 91.2 FL (ref 79–97)
MONOCYTES # BLD AUTO: 0.6 10*3/MM3 (ref 0.1–0.9)
MONOCYTES NFR BLD AUTO: 7.5 % (ref 5–12)
NEUTROPHILS NFR BLD AUTO: 6.6 10*3/MM3 (ref 1.7–7)
NEUTROPHILS NFR BLD AUTO: 76.6 % (ref 42.7–76)
NRBC BLD AUTO-RTO: 0 /100 WBC (ref 0–0.2)
PLATELET # BLD AUTO: 239 10*3/MM3 (ref 140–450)
PMV BLD AUTO: 7.2 FL (ref 6–12)
POTASSIUM SERPL-SCNC: 4.6 MMOL/L (ref 3.5–5.2)
PROT SERPL-MCNC: 7.3 G/DL (ref 6–8.5)
PROTHROMBIN TIME: 10.4 SECONDS (ref 9.6–11.7)
RBC # BLD AUTO: 5.06 10*6/MM3 (ref 3.77–5.28)
SODIUM SERPL-SCNC: 138 MMOL/L (ref 136–145)
TROPONIN T DELTA: 0 NG/L
TROPONIN T SERPL HS-MCNC: 10 NG/L
WBC NRBC COR # BLD: 8.6 10*3/MM3 (ref 3.4–10.8)

## 2023-04-15 PROCEDURE — 70498 CT ANGIOGRAPHY NECK: CPT

## 2023-04-15 PROCEDURE — 82962 GLUCOSE BLOOD TEST: CPT

## 2023-04-15 PROCEDURE — G0378 HOSPITAL OBSERVATION PER HR: HCPCS

## 2023-04-15 PROCEDURE — 36415 COLL VENOUS BLD VENIPUNCTURE: CPT

## 2023-04-15 PROCEDURE — 25510000001 IOPAMIDOL PER 1 ML: Performed by: EMERGENCY MEDICINE

## 2023-04-15 PROCEDURE — 70496 CT ANGIOGRAPHY HEAD: CPT

## 2023-04-15 PROCEDURE — 70551 MRI BRAIN STEM W/O DYE: CPT

## 2023-04-15 PROCEDURE — 99285 EMERGENCY DEPT VISIT HI MDM: CPT

## 2023-04-15 PROCEDURE — 84484 ASSAY OF TROPONIN QUANT: CPT | Performed by: EMERGENCY MEDICINE

## 2023-04-15 PROCEDURE — 85610 PROTHROMBIN TIME: CPT | Performed by: EMERGENCY MEDICINE

## 2023-04-15 PROCEDURE — 85730 THROMBOPLASTIN TIME PARTIAL: CPT | Performed by: EMERGENCY MEDICINE

## 2023-04-15 PROCEDURE — 85025 COMPLETE CBC W/AUTO DIFF WBC: CPT | Performed by: EMERGENCY MEDICINE

## 2023-04-15 PROCEDURE — 80053 COMPREHEN METABOLIC PANEL: CPT | Performed by: EMERGENCY MEDICINE

## 2023-04-15 PROCEDURE — 70450 CT HEAD/BRAIN W/O DYE: CPT

## 2023-04-15 PROCEDURE — 93005 ELECTROCARDIOGRAM TRACING: CPT | Performed by: EMERGENCY MEDICINE

## 2023-04-15 PROCEDURE — 71045 X-RAY EXAM CHEST 1 VIEW: CPT

## 2023-04-15 RX ORDER — ONDANSETRON 2 MG/ML
4 INJECTION INTRAMUSCULAR; INTRAVENOUS EVERY 6 HOURS PRN
Status: DISCONTINUED | OUTPATIENT
Start: 2023-04-15 | End: 2023-04-16 | Stop reason: HOSPADM

## 2023-04-15 RX ORDER — SODIUM CHLORIDE 0.9 % (FLUSH) 0.9 %
10 SYRINGE (ML) INJECTION AS NEEDED
Status: DISCONTINUED | OUTPATIENT
Start: 2023-04-15 | End: 2023-04-16 | Stop reason: HOSPADM

## 2023-04-15 RX ORDER — ACETAMINOPHEN 325 MG/1
650 TABLET ORAL EVERY 4 HOURS PRN
Status: DISCONTINUED | OUTPATIENT
Start: 2023-04-15 | End: 2023-04-16 | Stop reason: HOSPADM

## 2023-04-15 RX ORDER — SODIUM CHLORIDE 9 MG/ML
40 INJECTION, SOLUTION INTRAVENOUS AS NEEDED
Status: DISCONTINUED | OUTPATIENT
Start: 2023-04-15 | End: 2023-04-16 | Stop reason: HOSPADM

## 2023-04-15 RX ORDER — ONDANSETRON 4 MG/1
4 TABLET, FILM COATED ORAL EVERY 6 HOURS PRN
Status: DISCONTINUED | OUTPATIENT
Start: 2023-04-15 | End: 2023-04-16 | Stop reason: HOSPADM

## 2023-04-15 RX ORDER — CHOLECALCIFEROL (VITAMIN D3) 125 MCG
5 CAPSULE ORAL NIGHTLY PRN
Status: DISCONTINUED | OUTPATIENT
Start: 2023-04-15 | End: 2023-04-16 | Stop reason: HOSPADM

## 2023-04-15 RX ORDER — SODIUM CHLORIDE 0.9 % (FLUSH) 0.9 %
10 SYRINGE (ML) INJECTION EVERY 12 HOURS SCHEDULED
Status: DISCONTINUED | OUTPATIENT
Start: 2023-04-15 | End: 2023-04-16 | Stop reason: HOSPADM

## 2023-04-15 RX ORDER — FLUTICASONE PROPIONATE 50 MCG
2 SPRAY, SUSPENSION (ML) NASAL DAILY PRN
COMMUNITY

## 2023-04-15 RX ORDER — MECLIZINE HYDROCHLORIDE 25 MG/1
25 TABLET ORAL ONCE
Status: COMPLETED | OUTPATIENT
Start: 2023-04-15 | End: 2023-04-15

## 2023-04-15 RX ORDER — ATORVASTATIN CALCIUM 40 MG/1
40 TABLET, FILM COATED ORAL NIGHTLY
Status: DISCONTINUED | OUTPATIENT
Start: 2023-04-15 | End: 2023-04-16 | Stop reason: HOSPADM

## 2023-04-15 RX ORDER — ACETAMINOPHEN 650 MG/1
650 SUPPOSITORY RECTAL EVERY 4 HOURS PRN
Status: DISCONTINUED | OUTPATIENT
Start: 2023-04-15 | End: 2023-04-16 | Stop reason: HOSPADM

## 2023-04-15 RX ORDER — MECLIZINE HYDROCHLORIDE 25 MG/1
25 TABLET ORAL 2 TIMES DAILY PRN
Status: DISCONTINUED | OUTPATIENT
Start: 2023-04-15 | End: 2023-04-16 | Stop reason: HOSPADM

## 2023-04-15 RX ORDER — ASPIRIN 81 MG/1
81 TABLET ORAL DAILY
Status: DISCONTINUED | OUTPATIENT
Start: 2023-04-15 | End: 2023-04-16 | Stop reason: HOSPADM

## 2023-04-15 RX ORDER — ACETAMINOPHEN 160 MG/5ML
650 SOLUTION ORAL EVERY 4 HOURS PRN
Status: DISCONTINUED | OUTPATIENT
Start: 2023-04-15 | End: 2023-04-16 | Stop reason: HOSPADM

## 2023-04-15 RX ORDER — ALUMINA, MAGNESIA, AND SIMETHICONE 2400; 2400; 240 MG/30ML; MG/30ML; MG/30ML
15 SUSPENSION ORAL EVERY 6 HOURS PRN
Status: DISCONTINUED | OUTPATIENT
Start: 2023-04-15 | End: 2023-04-16 | Stop reason: HOSPADM

## 2023-04-15 RX ORDER — KETOROLAC TROMETHAMINE 15 MG/ML
15 INJECTION, SOLUTION INTRAMUSCULAR; INTRAVENOUS EVERY 6 HOURS PRN
Status: DISCONTINUED | OUTPATIENT
Start: 2023-04-15 | End: 2023-04-16 | Stop reason: HOSPADM

## 2023-04-15 RX ORDER — ENOXAPARIN SODIUM 100 MG/ML
40 INJECTION SUBCUTANEOUS EVERY 24 HOURS
Status: DISCONTINUED | OUTPATIENT
Start: 2023-04-16 | End: 2023-04-16 | Stop reason: HOSPADM

## 2023-04-15 RX ADMIN — Medication 10 ML: at 20:22

## 2023-04-15 RX ADMIN — ATORVASTATIN CALCIUM 40 MG: 40 TABLET, FILM COATED ORAL at 20:21

## 2023-04-15 RX ADMIN — IOPAMIDOL 100 ML: 755 INJECTION, SOLUTION INTRAVENOUS at 13:14

## 2023-04-15 RX ADMIN — ASPIRIN 81 MG: 81 TABLET, COATED ORAL at 20:22

## 2023-04-15 RX ADMIN — METOPROLOL TARTRATE 25 MG: 25 TABLET, FILM COATED ORAL at 20:22

## 2023-04-15 RX ADMIN — MECLIZINE HYDROCHLORIDE 25 MG: 25 TABLET ORAL at 11:24

## 2023-04-15 NOTE — ED PROVIDER NOTES
"Subjective   History of Present Illness  Chief complaint: Patient is a pleasant 77-year-old female.  She woke up this morning dizzy.  She states she got out of bed and could not walk straight.  Everything is \"spinning\".  She went to bed around 2.  Felt normal then.  She has however woken up the last 3 days with dizziness like this.  However today it did not go away.  She has had no recent ear infection or ear pain.  She states that 2 days ago she was working outside and developed chest pain while working.  She does have a history of coronary disease with stenting present.  No active chest pain currently.  She had no confusion.  No slurring of her speech.  No numbness or weakness    Context:    Duration: As above    Timing: Sudden    Severity: Severe    Associated Symptoms:        PCP:  LMP:        Review of Systems   Constitutional: Negative.    Cardiovascular: Positive for chest pain.   Gastrointestinal: Negative for abdominal pain.   Genitourinary: Negative.    Musculoskeletal: Negative.    Neurological: Positive for dizziness.   Psychiatric/Behavioral: Negative for confusion.       Past Medical History:   Diagnosis Date   • Acute coronary syndrome 2015   • CAD (coronary artery disease) 02/27/2015   • Cardiomyopathy    • COPD (chronic obstructive pulmonary disease)    • COVID 07/2022   • Dyspnea    • GERD (gastroesophageal reflux disease)    • Glaucoma    • Hx of non-ST elevation myocardial infarction (NSTEMI) 2015       Allergies   Allergen Reactions   • Penicillins Rash       Past Surgical History:   Procedure Laterality Date   • CARDIAC SURGERY  02/27/2015    Heart Cath   • CORONARY ANGIOPLASTY WITH STENT PLACEMENT  02/27/2015    STACEY: proximal RCA   • TUMOR REMOVAL      from stomach       Family History   Problem Relation Age of Onset   • Breast cancer Mother    • Lung cancer Father    • Heart disease Sister    • Diabetes Sister    • Stroke Sister    • Heart attack Sister    • Diabetes Paternal Grandfather  "       Social History     Socioeconomic History   • Marital status:    Tobacco Use   • Smoking status: Every Day     Packs/day: 0.50     Types: Cigarettes     Start date: 1973   • Smokeless tobacco: Never   Vaping Use   • Vaping Use: Never used   Substance and Sexual Activity   • Alcohol use: No   • Drug use: No   • Sexual activity: Defer           Objective   Physical Exam  Vitals and nursing note reviewed.   Constitutional:       Appearance: Normal appearance.   HENT:      Head: Normocephalic and atraumatic.   Eyes:      Extraocular Movements: Extraocular movements intact.      Pupils: Pupils are equal, round, and reactive to light.   Cardiovascular:      Rate and Rhythm: Normal rate and regular rhythm.   Pulmonary:      Effort: Pulmonary effort is normal.      Breath sounds: Normal breath sounds.   Abdominal:      Tenderness: There is no abdominal tenderness.   Musculoskeletal:         General: Normal range of motion.      Cervical back: Normal range of motion and neck supple.   Skin:     General: Skin is warm and dry.   Neurological:      General: No focal deficit present.      Mental Status: She is alert and oriented to person, place, and time.      Cranial Nerves: No cranial nerve deficit.      Sensory: No sensory deficit.      Motor: No weakness.      Coordination: Coordination normal.   Psychiatric:         Mood and Affect: Mood normal.         Thought Content: Thought content normal.         Procedures           ED Course  ED Course as of 04/15/23 1406   Sat Apr 15, 2023   1357 Awaiting cta   [LH]      ED Course User Index  [LH] Sukhwinder Rodriguez DO            Results for orders placed or performed during the hospital encounter of 04/15/23   Comprehensive Metabolic Panel    Specimen: Blood   Result Value Ref Range    Glucose 106 (H) 65 - 99 mg/dL    BUN 16 8 - 23 mg/dL    Creatinine 0.77 0.57 - 1.00 mg/dL    Sodium 138 136 - 145 mmol/L    Potassium 4.6 3.5 - 5.2 mmol/L    Chloride 102 98 - 107  mmol/L    CO2 25.0 22.0 - 29.0 mmol/L    Calcium 9.7 8.6 - 10.5 mg/dL    Total Protein 7.3 6.0 - 8.5 g/dL    Albumin 4.4 3.5 - 5.2 g/dL    ALT (SGPT) 13 1 - 33 U/L    AST (SGOT) 17 1 - 32 U/L    Alkaline Phosphatase 152 (H) 39 - 117 U/L    Total Bilirubin 0.6 0.0 - 1.2 mg/dL    Globulin 2.9 gm/dL    A/G Ratio 1.5 g/dL    BUN/Creatinine Ratio 20.8 7.0 - 25.0    Anion Gap 11.0 5.0 - 15.0 mmol/L    eGFR 78.6 >60.0 mL/min/1.73   Protime-INR    Specimen: Blood   Result Value Ref Range    Protime 10.4 9.6 - 11.7 Seconds    INR 1.01 0.93 - 1.10   aPTT    Specimen: Blood   Result Value Ref Range    PTT 27.3 (L) 61.0 - 76.5 seconds   High Sensitivity Troponin T    Specimen: Blood   Result Value Ref Range    HS Troponin T 10 (H) <10 ng/L   CBC Auto Differential    Specimen: Blood   Result Value Ref Range    WBC 8.60 3.40 - 10.80 10*3/mm3    RBC 5.06 3.77 - 5.28 10*6/mm3    Hemoglobin 15.7 12.0 - 15.9 g/dL    Hematocrit 46.1 34.0 - 46.6 %    MCV 91.2 79.0 - 97.0 fL    MCH 31.0 26.6 - 33.0 pg    MCHC 34.0 31.5 - 35.7 g/dL    RDW 14.2 12.3 - 15.4 %    RDW-SD 47.3 37.0 - 54.0 fl    MPV 7.2 6.0 - 12.0 fL    Platelets 239 140 - 450 10*3/mm3    Neutrophil % 76.6 (H) 42.7 - 76.0 %    Lymphocyte % 14.9 (L) 19.6 - 45.3 %    Monocyte % 7.5 5.0 - 12.0 %    Eosinophil % 0.5 0.3 - 6.2 %    Basophil % 0.5 0.0 - 1.5 %    Neutrophils, Absolute 6.60 1.70 - 7.00 10*3/mm3    Lymphocytes, Absolute 1.30 0.70 - 3.10 10*3/mm3    Monocytes, Absolute 0.60 0.10 - 0.90 10*3/mm3    Eosinophils, Absolute 0.00 0.00 - 0.40 10*3/mm3    Basophils, Absolute 0.00 0.00 - 0.20 10*3/mm3    nRBC 0.0 0.0 - 0.2 /100 WBC   High Sensitivity Troponin T 2Hr    Specimen: Blood   Result Value Ref Range    HS Troponin T 10 (H) <10 ng/L    Troponin T Delta 0 >=-4 - <+4 ng/L   POC Glucose Once    Specimen: Blood   Result Value Ref Range    Glucose 113 (H) 70 - 105 mg/dL   ECG 12 Lead Other; dizzy   Result Value Ref Range    QT Interval 421 ms            CT Head Without  Contrast    Result Date: 4/15/2023  Impression: Normal Electronically Signed: Issa Tirado  4/15/2023 1:27 PM EDT  Workstation ID: [id number]    CT Angiogram Neck    Result Date: 4/15/2023  Impression: No significant carotid arterial stenosis. No intracranial stenosis, branch artery occlusion or aneurysm. Electronically Signed: Issa Tirado  4/15/2023 1:36 PM EDT  Workstation ID: [id number]    XR Chest 1 View    Result Date: 4/15/2023  Impression: Cardiomegaly. Mild pulmonary interstitial prominence. Electronically Signed: Issa Tirado  4/15/2023 11:10 AM EDT  Workstation ID: [id number]    CT Angiogram Head    Result Date: 4/15/2023  Impression: No significant carotid arterial stenosis. No intracranial stenosis, branch artery occlusion or aneurysm. Electronically Signed: Issa Tirado  4/15/2023 1:36 PM EDT  Workstation ID: [id number]                               Medical Decision Making  Patient was evaluated for her ataxia and dizziness    Differential diagnosis includes but is not limited to peripheral vertigo, central vertigo, CVA, aneurysm, ACS, symptomatic    Patient on reevaluation she does remain dizzy but does note some improvement, CT scans revealed no hemorrhage or aneurysm or stenoses.  Her symptoms were not worse with motion or turning her head.  This seems more concerning for central cause.  MRI has been ordered patient will be observed in the hospital for further neurologic rule out.  Cardiac monitoring as well.  Her troponin is normal.  She not having active chest pain and there is no ST elevation on her EKG at this time    Ataxia: acute illness or injury  Vertigo: acute illness or injury  Amount and/or Complexity of Data Reviewed  Labs: ordered. Decision-making details documented in ED Course.     Details: Troponin is 10.  Chemistry panel noncontributory.  Hemoglobin stable  Radiology: ordered.     Details: CT angiogram shows no stenoses or aneurysm.  Reviewed by myself.  CT head shows no intracerebral  hemorrhage  ECG/medicine tests: ordered and independent interpretation performed.     Details: EKG interpreted by myself shows sinus bradycardia.  This was rate of 57.  This was compared with prior EKG dated 2/28/2015 sinus rhythm rate of 60 at that time      Risk  Prescription drug management.  Decision regarding hospitalization.          Final diagnoses:   None   Dizziness  Ataxia    ED Disposition  ED Disposition     None          No follow-up provider specified.       Medication List      No changes were made to your prescriptions during this visit.          Sukhwinder Rodriguez,   04/15/23 1532

## 2023-04-15 NOTE — H&P
[facility]   HISTORY AND PHYSICAL    Patient Name: Alfreda Gruber  : 1945  MRN: [id number]  Primary Care Physician:  Chika Islas MD  Date of admission: 4/15/2023    Subjective   Subjective     Chief Complaint: Dizziness    History of Present Illness   77-year-old female with PMH of CAD presents with chief complaint of dizziness.  Patient reports for the past 3 days she has been waking up with short lasting dizziness upon waking up.  Today she reports that it worsened and the fact that symptoms did not improve.  Patient reports that she felt unsteadiness on her feet denies sensation of room spinning.  Patient also reports some changes in her vision this morning.  Patient denies falling towards one particular side, denies any sensory changes, denies any focal extremity weakness.  Patient denied any chest pain or palpitations.  Patient denies any URI symptoms, history of TIA or strokes.  At time of assessment patient reports resolution in the symptoms that prompted her presentation.    Review of Systems   Negative except as mentioned above  Personal History     Past Medical History:   Diagnosis Date   • Acute coronary syndrome    • CAD (coronary artery disease) 2015   • Cardiomyopathy    • COPD (chronic obstructive pulmonary disease)    • COVID 2022   • Dyspnea    • GERD (gastroesophageal reflux disease)    • Glaucoma    • Hx of non-ST elevation myocardial infarction (NSTEMI)        Past Surgical History:   Procedure Laterality Date   • CARDIAC SURGERY  2015    Heart Cath   • CORONARY ANGIOPLASTY WITH STENT PLACEMENT  2015    STACEY: proximal RCA   • TUMOR REMOVAL      from stomach       Family History: family history includes Breast cancer in her mother; Diabetes in her paternal grandfather and sister; Heart attack in her sister; Heart disease in her sister; Lung cancer in her father; Stroke in her sister. Otherwise pertinent FHx was reviewed and not pertinent to  current issue.    Social History:  reports that she has been smoking cigarettes. She started smoking about 50 years ago. She has been smoking an average of .5 packs per day. She has never used smokeless tobacco. She reports that she does not drink alcohol and does not use drugs.    Home Medications:  albuterol sulfate HFA, aspirin, atorvastatin, fluticasone, latanoprost, and metoprolol tartrate    Allergies:  Allergies   Allergen Reactions   • Penicillins Rash       Objective    Objective     Vitals:   Temp:  [97.8 °F (36.6 °C)] 97.8 °F (36.6 °C)  Heart Rate:  [59-85] 59  Resp:  [16] 16  BP: (117-154)/(72-87) 154/72    Physical Exam  Constitutional:       General: She is not in acute distress.     Appearance: Normal appearance. She is not ill-appearing.   HENT:      Head: Normocephalic.      Mouth/Throat:      Mouth: Mucous membranes are moist.   Eyes:      Extraocular Movements: Extraocular movements intact.      Pupils: Pupils are equal, round, and reactive to light.   Cardiovascular:      Rate and Rhythm: Normal rate and regular rhythm.      Pulses: Normal pulses.   Pulmonary:      Effort: Pulmonary effort is normal. No respiratory distress.      Breath sounds: No wheezing or rales.   Abdominal:      General: Bowel sounds are normal. There is no distension.      Palpations: Abdomen is soft.      Tenderness: There is no abdominal tenderness.   Neurological:      General: No focal deficit present.      Mental Status: She is alert and oriented to person, place, and time.      Cranial Nerves: No cranial nerve deficit.      Sensory: No sensory deficit.      Motor: No weakness.      Comments: Cranial nerves II through XII grossly intact, no evidence of finger-nose agnosia, normal shin to heel test         Result Review    Result Review:  I have personally reviewed the results from the time of this admission to 4/15/2023 17:50 EDT and agree with these findings:  [x]  Laboratory list / accordion  [x]  Microbiology  [x]   Radiology  [x]  EKG/Telemetry   []  Cardiology/Vascular   []  Pathology  []  Old records  []  Other:  Most notable findings include:     Assessment & Plan   Assessment / Plan     Brief Patient Summary:  Alfreda Gruber is a 77 y.o. female who     Active Hospital Problems:  There are no active hospital problems to display for this patient.    Plan:     Dizziness  -DDx includes CVA, BPPV, Ménière's, labyrinthitis  -CTA of head and neck CT head without any acute pathology  -Patient underwent MRI brain showed no acute pathology  -PT evaluation  -As needed meclizine   -Outpatient ENT eval    CAD  -Continue with aspirin  -Continue Lipitor    DVT prophylaxis:  No DVT prophylaxis order currently exists.    CODE STATUS:       Admission Status:  I believe this patient meets observation status.    Agustin Allen MD

## 2023-04-15 NOTE — Clinical Note
Level of Care: Telemetry [5]   Admitting Physician: KARYN LEDESMA [[id number]]   Attending Physician: KARYN LEDESMA [[id number]]

## 2023-04-16 ENCOUNTER — READMISSION MANAGEMENT (OUTPATIENT)
Dept: CALL CENTER | Facility: HOSPITAL | Age: 78
End: 2023-04-16
Payer: MEDICARE

## 2023-04-16 VITALS
WEIGHT: 153.88 LBS | RESPIRATION RATE: 19 BRPM | DIASTOLIC BLOOD PRESSURE: 66 MMHG | HEIGHT: 64 IN | TEMPERATURE: 97.9 F | BODY MASS INDEX: 26.27 KG/M2 | OXYGEN SATURATION: 94 % | SYSTOLIC BLOOD PRESSURE: 135 MMHG | HEART RATE: 63 BPM

## 2023-04-16 LAB — QT INTERVAL: 421 MS

## 2023-04-16 PROCEDURE — 97162 PT EVAL MOD COMPLEX 30 MIN: CPT

## 2023-04-16 PROCEDURE — G0378 HOSPITAL OBSERVATION PER HR: HCPCS

## 2023-04-16 RX ORDER — MECLIZINE HYDROCHLORIDE 25 MG/1
25 TABLET ORAL 2 TIMES DAILY PRN
Qty: 14 TABLET | Refills: 0 | Status: SHIPPED | OUTPATIENT
Start: 2023-04-16 | End: 2023-04-23

## 2023-04-16 RX ADMIN — Medication 10 ML: at 08:06

## 2023-04-16 RX ADMIN — ASPIRIN 81 MG: 81 TABLET, COATED ORAL at 08:06

## 2023-04-16 NOTE — DISCHARGE SUMMARY
[facility]  DISCHARGE SUMMARY    Patient Name: Alfreda Gruber  : 1945  MRN: [id number]    Date of Admission: 4/15/2023  Discharge Diagnosis: BPPV  Date of Discharge: 2023  Primary Care Physician: Chika Islas MD      Presenting Problem:   Dizziness [R42]  Ataxia [R27.0]  Vertigo [R42]    Active and Resolved Hospital Problems:  Active Hospital Problems    Diagnosis POA   • **Dizziness [R42] Yes      Resolved Hospital Problems   No resolved problems to display.         Hospital Course     Hospital Course:  Alfreda Gruber is a 77 y.o. female     Dizziness  -Likely BPPV  -CTA of head and neck CT head without any acute pathology  -Patient underwent MRI brain showed no acute pathology  -PT evaluation, recommending patient undergo outpatient PT  -As needed meclizine   -Outpatient ENT eval    Stable condition for discharge home    DISCHARGE Follow Up Recommendations for labs and diagnostics:   -ENT eval    Reasons For Change In Medications and Indications for New Medications:  -As needed meclizine    Day of Discharge     Vital Signs:  Temp:  [97.3 °F (36.3 °C)-98.1 °F (36.7 °C)] 98.1 °F (36.7 °C)  Heart Rate:  [59-76] 64  Resp:  [14-20] 18  BP: (119-154)/(59-72) 119/59    Physical Exam:  Physical Exam   Constitutional:       General: She is not in acute distress.     Appearance: Normal appearance. She is not ill-appearing.   HENT:      Head: Normocephalic.      Mouth/Throat:      Mouth: Mucous membranes are moist.   Eyes:      Extraocular Movements: Extraocular movements intact.      Pupils: Pupils are equal, round, and reactive to light.   Cardiovascular:      Rate and Rhythm: Normal rate and regular rhythm.      Pulses: Normal pulses.   Pulmonary:      Effort: Pulmonary effort is normal. No respiratory distress.      Breath sounds: No wheezing or rales.   Abdominal:      General: Bowel sounds are normal. There is no distension.       Palpations: Abdomen is soft.      Tenderness: There is no abdominal tenderness.   Neurological:      General: No focal deficit present.      Mental Status: She is alert and oriented to person, place, and time.      Cranial Nerves: No cranial nerve deficit.      Sensory: No sensory deficit.      Motor: No weakness.      Comments: Cranial nerves II through XII grossly intact, no evidence of finger-nose agnosia, normal shin to heel test     Pertinent  and/or Most Recent Results     LAB RESULTS:      Lab 04/15/23  1122   WBC 8.60   HEMOGLOBIN 15.7   HEMATOCRIT 46.1   PLATELETS 239   NEUTROS ABS 6.60   LYMPHS ABS 1.30   MONOS ABS 0.60   EOS ABS 0.00   MCV 91.2   PROTIME 10.4   APTT 27.3*         Lab 04/15/23  1122   SODIUM 138   POTASSIUM 4.6   CHLORIDE 102   CO2 25.0   ANION GAP 11.0   BUN 16   CREATININE 0.77   EGFR 78.6   GLUCOSE 106*   CALCIUM 9.7         Lab 04/15/23  1122   TOTAL PROTEIN 7.3   ALBUMIN 4.4   GLOBULIN 2.9   ALT (SGPT) 13   AST (SGOT) 17   BILIRUBIN 0.6   ALK PHOS 152*         Lab 04/15/23  1432 04/15/23  1122   HSTROP T 10* 10*   PROTIME  --  10.4   INR  --  1.01                 Brief Urine Lab Results     None        Microbiology Results (last 10 days)     ** No results found for the last 240 hours. **          CT Head Without Contrast    Result Date: 4/15/2023  Impression: Impression: Normal Electronically Signed: Issa Tirado  4/15/2023 1:27 PM EDT  Workstation ID: [id number]    CT Angiogram Neck    Result Date: 4/15/2023  Impression: Impression: No significant carotid arterial stenosis. No intracranial stenosis, branch artery occlusion or aneurysm. Electronically Signed: Issa Tirado  4/15/2023 1:36 PM EDT  Workstation ID: [id number]    MRI Brain Without Contrast    Result Date: 4/15/2023  Impression: Impression: Chronic microvascular ischemic change. No acute intracranial process. Electronically Signed: Issa Tirado  4/15/2023 5:47 PM EDT  Workstation ID: [id number]    XR Chest 1 View    Result Date:  4/15/2023  Impression: Impression: Cardiomegaly. Mild pulmonary interstitial prominence. Electronically Signed: Issa Tirado  4/15/2023 11:10 AM EDT  Workstation ID: [id number]    CT Angiogram Head    Result Date: 4/15/2023  Impression: Impression: No significant carotid arterial stenosis. No intracranial stenosis, branch artery occlusion or aneurysm. Electronically Signed: Issa Tirdao  4/15/2023 1:36 PM EDT  Workstation ID: [id number]                  Labs Pending at Discharge:      Procedures Performed           Consults:   Consults     Date and Time Order Name Status Description    4/15/2023  2:46 PM Hospitalist (on-call MD unless specified)              Discharge Details        Discharge Medications      New Medications      Instructions Start Date   meclizine 25 MG tablet  Commonly known as: ANTIVERT   25 mg, Oral, 2 Times Daily PRN         Continue These Medications      Instructions Start Date   albuterol sulfate  (90 Base) MCG/ACT inhaler  Commonly known as: PROVENTIL HFA;VENTOLIN HFA;PROAIR HFA   2 puffs, Inhalation, Every 4 Hours PRN      aspirin 81 MG EC tablet   81 mg, Oral, Daily      atorvastatin 40 MG tablet  Commonly known as: LIPITOR   40 mg, Oral, Daily      fluticasone 50 MCG/ACT nasal spray  Commonly known as: FLONASE   2 sprays, Nasal, Daily PRN      latanoprost 0.005 % ophthalmic solution  Commonly known as: XALATAN   Administer 1 drop to both eyes Every Night.      metoprolol tartrate 25 MG tablet  Commonly known as: LOPRESSOR   TAKE 1 TABLET BY MOUTH TWICE DAILY             Allergies   Allergen Reactions   • Penicillins Rash         Discharge Disposition: Home  Home or Self Care    Diet:  Hospital:  Diet Order   Procedures   • Diet: Cardiac Diets; Healthy Heart (2-3 Na+); Texture: Regular Texture (IDDSI 7); Fluid Consistency: Thin (IDDSI 0)         Discharge Activity:         CODE STATUS:  There are no questions and answers to display.         Future Appointments   Date Time Provider  Department Center   8/29/2023 11:30 AM Gomez Orlando MD [facility]           Time spent on Discharge including face to face service: 30 minutes        Signature: Electronically signed by Agustin Allen MD, 04/16/23, 1:54 PM EDT.

## 2023-04-16 NOTE — OUTREACH NOTE
Prep Survey    Flowsheet Row Responses   [facility] facility patient discharged from? [facility]   Is LACE score < 7 ? Yes   Eligibility [facility]   Date of Admission 04/15/23   Date of Discharge 04/16/23   Discharge Disposition Home or Self Care   Discharge diagnosis dizziness likely BPPV   Does the patient have one of the following disease processes/diagnoses(primary or secondary)? Other   Does the patient have Home health ordered? No   Is there a DME ordered? No   Prep survey completed? Yes          Caryn COTTO - Registered Nurse

## 2023-04-16 NOTE — PLAN OF CARE
Goal Outcome Evaluation:  Plan of Care Reviewed With: patient        Progress: no change  Outcome Evaluation: Pt rested well throughout the night. Family stayed at the bedside. Pt stated the dizziness is worse when lying down. Getting better sitting up. Patient refused PRN meds for dizziness. Orthostatic BP taken. All vital stable with room air. No other concern at this time.Will continue to monitor

## 2023-04-17 ENCOUNTER — TRANSITIONAL CARE MANAGEMENT TELEPHONE ENCOUNTER (OUTPATIENT)
Dept: CALL CENTER | Facility: HOSPITAL | Age: 78
End: 2023-04-17
Payer: MEDICARE

## 2023-04-17 NOTE — THERAPY EVALUATION
Patient Name: Alfreda Gruber  : 1945    MRN: [id number]                              Today's Date: 2023       Admit Date: 4/15/2023    Visit Dx:     ICD-10-CM ICD-9-CM   1. Vertigo  R42 780.4   2. Ataxia  R27.0 781.3     Patient Active Problem List   Diagnosis   • Cardiomyopathy   • Chronic coronary artery disease   • Chronic obstructive pulmonary disease   • Encounter for general adult medical examination without abnormal findings   • Gastroesophageal reflux disease   • Hearing loss   • Hypertension   • Osteoporosis   • Other screening mammogram   • Status post percutaneous transluminal coronary angioplasty   • Tobacco use   • Varicose veins of lower extremity   • Dyslipidemia   • Total, mature senile cataract   • Primary open angle glaucoma   • Presbyopia   • Nuclear senile cataract   • Posterior capsule opacification   • Dizziness     Past Medical History:   Diagnosis Date   • Acute coronary syndrome    • CAD (coronary artery disease) 2015   • Cardiomyopathy    • COPD (chronic obstructive pulmonary disease)    • COVID 2022   • Dyspnea    • GERD (gastroesophageal reflux disease)    • Glaucoma    • Hx of non-ST elevation myocardial infarction (NSTEMI)      Past Surgical History:   Procedure Laterality Date   • CARDIAC SURGERY  2015    Heart Cath   • CORONARY ANGIOPLASTY WITH STENT PLACEMENT  2015    STACEY: proximal RCA   • TUMOR REMOVAL      from stomach      General Information     Row Name 23          Physical Therapy Time and Intention    Document Type evaluation  -PG     Mode of Treatment physical therapy  -PG     Row Name 23          General Information    Patient Profile Reviewed yes  -PG     Prior Level of Function independent:  -PG     Existing Precautions/Restrictions fall  -PG     Barriers to Rehab none identified  -PG     Row Name 23          Living Environment    People in Home spouse  -PG     Row Name 23          Home  Main Entrance    Number of Stairs, Main Entrance none  -PG     Row Name 04/16/23 2051          Cognition    Orientation Status (Cognition) oriented x 4  -PG     Row Name 04/16/23 2051          Safety Issues, Functional Mobility    Safety Issues Affecting Function (Mobility) safety precaution awareness  -PG     Impairments Affecting Function (Mobility) balance;postural/trunk control;endurance/activity tolerance  -PG           User Key  (r) = Recorded By, (t) = Taken By, (c) = Cosigned By    Initials Name Provider Type    Estela Hernandez PT Physical Therapist               Mobility     Row Name 04/16/23 2052          Bed Mobility    Bed Mobility bed mobility (all) activities  -PG     All Activities, [place] (Bed Mobility) modified independence  -PG     Assistive Device (Bed Mobility) head of bed elevated  -PG     Row Name 04/16/23 2052          Sit-Stand Transfer    Sit-Stand [place] (Transfers) standby assist  -PG     Row Name 04/16/23 2052          Gait/Stairs (Locomotion)    [place] Level (Gait) standby assist  -PG     Distance in Feet (Gait) 30ft  -PG           User Key  (r) = Recorded By, (t) = Taken By, (c) = Cosigned By    Initials Name Provider Type    PG Estela Solis PT Physical Therapist               Obj/Interventions     Row Name 04/16/23 2053          Range of Motion Comprehensive    General Range of Motion bilateral upper extremity ROM WFL;bilateral lower extremity ROM WFL  -PG     Row Name 04/16/23 2053          Strength Comprehensive (MMT)    General Manual Muscle Testing (MMT) Assessment no strength deficits identified  -PG     Row Name 04/16/23 2053          Balance    Balance Assessment sitting static balance;sitting dynamic balance;sit to stand dynamic balance;standing dynamic balance  -PG     Static Sitting Balance modified independence  -PG     Dynamic Sitting Balance standby assist  -PG     Position, Sitting Balance sitting edge of bed;unsupported  -PG     Sit to Stand  Dynamic Balance standby assist  -PG     Dynamic Standing Balance standby assist  -PG           User Key  (r) = Recorded By, (t) = Taken By, (c) = Cosigned By    Initials Name Provider Type    Estela Hernandez PT Physical Therapist               Goals/Plan     Row Name 04/16/23 2100          Transfer Goal 1 (PT)    Activity/Assistive Device (Transfer Goal 1, PT) transfers, all  -PG     [place] Level/Cues Needed (Transfer Goal 1, PT) independent  -PG     Time Frame (Transfer Goal 1, PT) long term goal (LTG);2 weeks  -PG     Row Name 04/16/23 2100          Gait Training Goal 1 (PT)    Activity/Assistive Device (Gait Training Goal 1, PT) gait (walking locomotion)  -PG     [place] Level (Gait Training Goal 1, PT) independent  -PG     Distance (Gait Training Goal 1, PT) 200ft  -PG     Time Frame (Gait Training Goal 1, PT) long term goal (LTG);2 weeks  -PG     Row Name 04/16/23 2100          Therapy Assessment/Plan (PT)    Planned Therapy Interventions (PT) balance training;bed mobility training;gait training;home exercise program;neuromuscular re-education;patient/family education;postural re-education;transfer training;stretching;strengthening;ROM (range of motion)  -PG           User Key  (r) = Recorded By, (t) = Taken By, (c) = Cosigned By    Initials Name Provider Type    Estela Hernandez, PT Physical Therapist               Clinical Impression     Row Name 04/16/23 2053          Pain    Pretreatment Pain Rating 0/10 - no pain  -PG     Posttreatment Pain Rating 0/10 - no pain  -PG     Row Name 04/16/23 2053          Plan of Care Review    Plan of Care Reviewed With patient  -PG     Progress improving  -PG     Outcome Evaluation Patient 78 yo female admitted to the hospital with the complaint of dizziness.  CT head shows no acute pathology. Patient underwent MRI brain showed no acute pathology. Recent Dx includes Dizziness Likely BPPV, CAD. At baseline, patient resides with spouse in a house and is  independent in mobilityand ADLs. As per today's evaluation, patient required SBA for transfers and ambulation of around 30ft. Educated the patient to sit down when feeling dizzy and use a cane to mitigate the risk of fall.   PT recommend OP PT to treat dizziness likely BPPV.  -PG     Row Name 04/16/23 2053          Therapy Assessment/Plan (PT)    Patient/Family Therapy Goals Statement (PT) To return to PLOF  -PG     Rehab Potential (PT) good, to achieve stated therapy goals  -PG     Criteria for Skilled Interventions Met (PT) yes;skilled treatment is necessary  -PG     Therapy Frequency (PT) 2 times/wk  -PG     Predicted Duration of Therapy Intervention (PT) until d/c  -PG     Row Name 04/16/23 2053          Positioning and Restraints    Pre-Treatment Position in bed  -PG     Post Treatment Position bed  -PG     In Bed notified nsg;call light within reach;encouraged to call for assist;sitting EOB  -PG           User Key  (r) = Recorded By, (t) = Taken By, (c) = Cosigned By    Initials Name Provider Type    PG Estela Solis, PT Physical Therapist               Outcome Measures     Row Name 04/16/23 2100          How much help from another person do you currently need...    Turning from your back to your side while in flat bed without using bedrails? 4  -PG     Moving from lying on back to sitting on the side of a flat bed without bedrails? 4  -PG     Moving to and from a bed to a chair (including a wheelchair)? 4  -PG     Standing up from a chair using your arms (e.g., wheelchair, bedside chair)? 4  -PG     Climbing 3-5 steps with a railing? 3  -PG     To walk in hospital room? 4  -PG     AM-PAC 6 Clicks Score (PT) 23  -PG     Highest level of mobility 7 --> Walked 25 feet or more  -PG     Row Name 04/16/23 2100          Functional Assessment    Outcome Measure Options AM-PAC 6 Clicks Basic Mobility (PT)  -PG           User Key  (r) = Recorded By, (t) = Taken By, (c) = Cosigned By    Initials Name Provider Type     Estela Hernandez PT Physical Therapist                             Physical Therapy Education     Title: PT OT SLP Therapies (Done)     Topic: Physical Therapy (Done)     Point: Precautions (Done)     Learning Progress Summary           Patient Acceptance, E, VU by PG at 4/16/2023 2101                               User Key     Initials Effective Dates Name Provider Type Discipline    PG 09/01/22 -  Estela Solis PT Physical Therapist PT              PT Recommendation and Plan  Planned Therapy Interventions (PT): balance training, bed mobility training, gait training, home exercise program, neuromuscular re-education, patient/family education, postural re-education, transfer training, stretching, strengthening, ROM (range of motion)  Plan of Care Reviewed With: patient  Progress: improving  Outcome Evaluation: Patient 76 yo female admitted to the hospital with the complaint of dizziness.  CT head shows no acute pathology. Patient underwent MRI brain showed no acute pathology. Recent Dx includes Dizziness Likely BPPV, CAD. At baseline, patient resides with spouse in a house and is independent in mobilityand ADLs. As per today's evaluation, patient required SBA for transfers and ambulation of around 30ft. Educated the patient to sit down when feeling dizzy and use a cane to mitigate the risk of fall.   PT recommend OP PT to treat dizziness likely BPPV.     Time Calculation:    PT Charges     Row Name 04/16/23 2101             Time Calculation    Start Time 1231  -PG      Stop Time 1247  -PG      Time Calculation (min) 16 min  -PG      PT Received On 04/16/23  -PG      PT - Next Appointment 04/17/23  -PG      PT Goal Re-Cert Due Date 04/30/23  -PG         Time Calculation- PT    Total Timed Code Minutes- PT 0 minute(s)  -PG            User Key  (r) = Recorded By, (t) = Taken By, (c) = Cosigned By    Initials Name Provider Type    Estela Hernandez PT Physical Therapist              Therapy Charges for Today      Code Description Service Date Service Provider Modifiers Qty    39344309001 HC PT EVAL MOD COMPLEXITY 3 4/16/2023 Estela Solis, PT GP 1          PT G-Codes  Outcome Measure Options: AM-PAC 6 Clicks Basic Mobility (PT)  AM-PAC 6 Clicks Score (PT): 23  PT Discharge Summary  Anticipated Discharge Disposition (PT): home with assist, home with outpatient therapy services    Estela Solis, PT  4/16/2023

## 2023-04-17 NOTE — PLAN OF CARE
Goal Outcome Evaluation:  Plan of Care Reviewed With: patient        Progress: improving  Outcome Evaluation: Patient 76 yo female admitted to the hospital with the complaint of dizziness.  CT head shows no acute pathology. Patient underwent MRI brain showed no acute pathology. Recent Dx includes Dizziness Likely BPPV, CAD. At baseline, patient resides with spouse in a house and is independent in mobilityand ADLs. As per today's evaluation, patient required SBA for transfers and ambulation of around 30ft. Educated the patient to sit down when feeling dizzy and use a cane to mitigate the risk of fall.   PT recommend OP PT to treat dizziness likely BPPV.

## 2023-04-17 NOTE — CASE MANAGEMENT/SOCIAL WORK
Case Management Discharge Note      Final Note: Routine Home                 Transportation Services  Private: Car    Final Discharge Disposition Code: 01 - home or self-care

## 2023-04-17 NOTE — OUTREACH NOTE
Call Center TCM Note    Flowsheet Row Responses   [facility], [place] patient discharged from? [facility]   Does the patient have one of the following disease processes/diagnoses(primary or secondary)? Other   TCM attempt successful? Yes   Call start time 1209   Call end time 1225   Discharge diagnosis dizziness likely BPPV   Meds reviewed with patient/caregiver? Yes   Is the patient having any side effects they believe may be caused by any medication additions or changes? No   Does the patient have all medications ordered at discharge? Yes   Is the patient taking all medications as directed (includes completed medication regime)? Yes   Does the patient have an appointment with their PCP within 7 days of discharge? Yes  [4/19/2023 at 1:45 PM]   Psychosocial issues? No   Did the patient receive a copy of their discharge instructions? Yes   Nursing interventions Reviewed instructions with patient   What is the patient's perception of their health status since discharge? Improving   Is the patient/caregiver able to teach back signs and symptoms related to disease process for when to call PCP? Yes   Is the patient/caregiver able to teach back signs and symptoms related to disease process for when to call 911? Yes   Is the patient/caregiver able to teach back the hierarchy of who to call/visit for symptoms/problems? PCP, Specialist, Home health nurse, Urgent Care, ED, 911 Yes   If the patient is a current smoker, are they able to teach back resources for cessation? Not a smoker   TCM call completed? Yes   Wrap up additional comments Pt states she is still having some dizziness. Reviewed AVS/medications with pt. Pt verified PCP hospital fu appt on 4/19/23.   Call end time 1225   Would this patient benefit from a Referral to Amb Social Work? No   Is the patient interested in additional calls from an ambulatory ?  NOTE:  applies to high risk patients requiring additional follow-up. No          Irma Rodriguez  RN    4/17/2023, 12:26 EDT

## 2023-04-19 ENCOUNTER — OFFICE VISIT (OUTPATIENT)
Dept: FAMILY MEDICINE CLINIC | Facility: CLINIC | Age: 78
End: 2023-04-19
Payer: MEDICARE

## 2023-04-19 VITALS
WEIGHT: 152.9 LBS | SYSTOLIC BLOOD PRESSURE: 128 MMHG | HEART RATE: 61 BPM | HEIGHT: 64 IN | DIASTOLIC BLOOD PRESSURE: 83 MMHG | TEMPERATURE: 97.7 F | BODY MASS INDEX: 26.1 KG/M2 | OXYGEN SATURATION: 97 %

## 2023-04-19 DIAGNOSIS — R07.9 CHEST PAIN, UNSPECIFIED TYPE: ICD-10-CM

## 2023-04-19 DIAGNOSIS — R42 VERTIGO: Primary | ICD-10-CM

## 2023-04-19 DIAGNOSIS — R27.0 ATAXIA: ICD-10-CM

## 2023-04-19 DIAGNOSIS — H53.8 BLURRED VISION: ICD-10-CM

## 2023-04-19 NOTE — PROGRESS NOTES
"Transitional Care Follow Up Visit  Subjective     Alfreda Gruber is a 77 y.o. female who presents for a transitional care management visit.    Within 48 business hours after discharge our office contacted her via telephone to coordinate her care and needs.      I reviewed and discussed the details of that call along with the discharge summary, hospital problems, inpatient lab results, inpatient diagnostic studies, and consultation reports with Alfreda.     Current outpatient and discharge medications have been reconciled for the patient.  Reviewed by: GRADY Stone          4/16/2023     3:58 PM   Date of TCM Phone Call   [facility]   Date of Admission 4/15/2023   Date of Discharge 4/16/2023   Discharge Disposition Home or Self Care     Risk for Readmission (LACE) Score: 5 (4/16/2023  6:00 AM)      History of Present Illness   Course During Hospital Stay:     \"Hospital Course:  Alfreda Gruber is a 77 y.o. female      Dizziness  -Likely BPPV  -CTA of head and neck CT head without any acute pathology  -Patient underwent MRI brain showed no acute pathology  -PT evaluation, recommending patient undergo outpatient PT  -As needed meclizine   -Outpatient ENT eval     Stable condition for discharge home     DISCHARGE Follow Up Recommendations for labs and diagnostics:   -ENT eval     Reasons For Change In Medications and Indications for New Medications:  -As needed meclizine\"    Pt is here today following up from 04/15 hospitalization for dizziness. She says dizziness has improved but she is still feeling off balance and is now having blurry vision. She denies a headache, confusion, weakness.    The day before this episode she says she had some mild chest pain after walking a lot and took an aspirin and it went away. Then she had cold sweats and nausea, indigestion as well that night.     The following portions of the patient's history were reviewed and updated as appropriate: allergies, current " medications, past family history, past medical history, past social history, past surgical history and problem list.    Review of Systems   Constitutional: Negative for chills, fatigue and fever.   HENT: Negative for ear discharge and ear pain.    Eyes: Positive for visual disturbance. Negative for pain, discharge, redness and itching.   Respiratory: Negative for cough, shortness of breath and wheezing.    Cardiovascular: Negative for chest pain, palpitations and leg swelling.   Gastrointestinal: Negative for abdominal pain, nausea and vomiting.   Musculoskeletal: Positive for neck pain (arthritis). Negative for myalgias and neck stiffness.   Skin: Negative for color change, rash and wound.   Neurological: Positive for light-headedness. Negative for dizziness, syncope, weakness and headaches.   Psychiatric/Behavioral: Negative for agitation, behavioral problems and confusion. The patient is not nervous/anxious.        Objective   Physical Exam  Vitals reviewed.   Constitutional:       General: She is not in acute distress.     Appearance: Normal appearance. She is not ill-appearing, toxic-appearing or diaphoretic.   HENT:      Head: Normocephalic and atraumatic.      Right Ear: There is impacted cerumen.      Left Ear: There is impacted cerumen.   Cardiovascular:      Rate and Rhythm: Normal rate and regular rhythm.      Pulses: Normal pulses.      Heart sounds: Normal heart sounds.   Pulmonary:      Effort: Pulmonary effort is normal. No respiratory distress.      Breath sounds: Normal breath sounds.   Musculoskeletal:      Cervical back: No rigidity or tenderness.   Skin:     General: Skin is warm and dry.      Capillary Refill: Capillary refill takes less than 2 seconds.   Neurological:      General: No focal deficit present.      Mental Status: She is alert and oriented to person, place, and time.   Psychiatric:         Mood and Affect: Mood normal.         Behavior: Behavior normal.         Thought Content:  Thought content normal.         Judgment: Judgment normal.         Assessment & Plan   Diagnoses and all orders for this visit:    1. Vertigo (Primary)  Comments:  -Wants to wait to try Vestibular PT until after heart doctor    2. Ataxia    3. Blurred vision  Comments:  -f/u with eye doc    4. Chest pain, unspecified type  Comments:  -f/u with cardiologist

## 2023-04-21 ENCOUNTER — TELEPHONE (OUTPATIENT)
Dept: CARDIOLOGY | Facility: CLINIC | Age: 78
End: 2023-04-21
Payer: MEDICARE

## 2023-04-21 NOTE — TELEPHONE ENCOUNTER
Caller: Alfreda Gruber    Relationship to patient: Self    Best call back number: [phone]    Chief complaint: CHEST PAIN, DIZZINESS    Type of visit: ED FOLLOW UP    Requested date: AS NEEDED    Additional notes: PT REPORTS THAT SHE WAS SEEN IN ED FOR CHEST PAIN AND SHE TOOK ASPIRIN AND IT HELPED, WHEN IT HAPPENED SIMILARLY AND SHE WAS DIZZY, SHE WENT TO ED - PT WAS TOLD TO FOLLOW UP WITH CARDIOLOGIST AS NOTHING CONCLUSIVE FOUND - PT REPORTS HER ENZYME COUNT WAS LOW - PT REQUESTING TO BE SEEN - PT NOTES SHE HASNT HAD ANY PAINS SINCE

## 2023-05-16 ENCOUNTER — OFFICE VISIT (OUTPATIENT)
Dept: CARDIOLOGY | Facility: CLINIC | Age: 78
End: 2023-05-16
Payer: MEDICARE

## 2023-05-16 VITALS
OXYGEN SATURATION: 94 % | BODY MASS INDEX: 26.63 KG/M2 | WEIGHT: 156 LBS | SYSTOLIC BLOOD PRESSURE: 141 MMHG | HEART RATE: 80 BPM | HEIGHT: 64 IN | DIASTOLIC BLOOD PRESSURE: 78 MMHG

## 2023-05-16 DIAGNOSIS — I25.10 CHRONIC CORONARY ARTERY DISEASE: Primary | ICD-10-CM

## 2023-05-16 DIAGNOSIS — I10 PRIMARY HYPERTENSION: ICD-10-CM

## 2023-05-16 PROCEDURE — 3077F SYST BP >= 140 MM HG: CPT | Performed by: INTERNAL MEDICINE

## 2023-05-16 PROCEDURE — 1159F MED LIST DOCD IN RCRD: CPT | Performed by: INTERNAL MEDICINE

## 2023-05-16 PROCEDURE — 99213 OFFICE O/P EST LOW 20 MIN: CPT | Performed by: INTERNAL MEDICINE

## 2023-05-16 PROCEDURE — 1160F RVW MEDS BY RX/DR IN RCRD: CPT | Performed by: INTERNAL MEDICINE

## 2023-05-16 PROCEDURE — 3078F DIAST BP <80 MM HG: CPT | Performed by: INTERNAL MEDICINE

## 2023-05-16 RX ORDER — ATORVASTATIN CALCIUM 40 MG/1
40 TABLET, FILM COATED ORAL DAILY
Qty: 90 TABLET | Refills: 3 | Status: SHIPPED | OUTPATIENT
Start: 2023-05-16 | End: 2023-05-16 | Stop reason: SDUPTHER

## 2023-05-16 RX ORDER — ATORVASTATIN CALCIUM 40 MG/1
40 TABLET, FILM COATED ORAL DAILY
Qty: 90 TABLET | Refills: 3 | Status: SHIPPED | OUTPATIENT
Start: 2023-05-16

## 2023-05-16 NOTE — PROGRESS NOTES
Progress note      Name: Alfreda Gruber ADMIT: (Not on file)   : 1945  PCP: Chika Islas MD    MRN: [id number] LOS: 0 days   AGE/SEX: 77 y.o. female  ROOM: Room/bed info not found     Chief Complaint   Patient presents with   • Coronary Artery Disease       Subjective       History of present illness  Alfreda Gruber is a 77-year-old female patient with history of coronary artery disease status post PCI due to myocardial infarction , also has COPD and ongoing smoking, here today for follow-up.  Patient is doing well denies having any chest pain, she does have chronic shortness of breath but unchanged, no lower extremity edema no palpitations no syncopal episodes.    Past Medical History:   Diagnosis Date   • Acute coronary syndrome    • CAD (coronary artery disease) 2015   • Cardiomyopathy    • COPD (chronic obstructive pulmonary disease)    • COVID 2022   • Dyspnea    • GERD (gastroesophageal reflux disease)    • Glaucoma    • Hx of non-ST elevation myocardial infarction (NSTEMI)      Past Surgical History:   Procedure Laterality Date   • CARDIAC SURGERY  2015    Heart Cath   • CORONARY ANGIOPLASTY WITH STENT PLACEMENT  2015    STACEY: proximal RCA   • TUMOR REMOVAL      from stomach     Family History   Problem Relation Age of Onset   • Breast cancer Mother    • Lung cancer Father    • Heart disease Sister    • Diabetes Sister    • Stroke Sister    • Heart attack Sister    • Diabetes Paternal Grandfather      Social History     Tobacco Use   • Smoking status: Every Day     Packs/day: 0.50     Types: Cigarettes     Start date:      Passive exposure: Past   • Smokeless tobacco: Never   Vaping Use   • Vaping Use: Never used   Substance Use Topics   • Alcohol use: No   • Drug use: No     (Not in a hospital admission)    Allergies:  Penicillins      Physical Exam  VITALS REVIEWED    General:      well developed, in no acute distress.    Head:       normocephalic and atraumatic.    Eyes:      PERRL/EOM intact, conjunctiva and sclera clear with out nystagmus.    Neck:      no masses, thyromegaly,  trachea central with normal respiratory effort and PMI displaced laterally  Lungs:      Clear to auscultation bilaterally  Heart:       Regular rate and rhythm  Msk:      no deformity or scoliosis noted of thoracic or lumbar spine.    Pulses:      pulses normal in all 4 extremities.    Extremities:       No lower extremity edema  Neurologic:      no focal deficits.   alert oriented x3  Skin:      intact without lesions or rashes.    Psych:      alert and cooperative; normal mood and affect; normal attention span and concentration.      Result Review :               Pertinent cardiac workup          Procedures        Assessment and Plan      Alfreda Gruber is a 77-year-old female patient was coronary artery disease status post PCI, patient does not have any anginal symptoms or CHF symptoms at this time.  She is on good therapy with aspirin, metoprolol and Lipitor for her CAD.  We will continue same therapy for now, I will see her in follow-up in 8 months.  Smoking cessation was advised during this visit.    Diagnoses and all orders for this visit:    1. Chronic coronary artery disease (Primary)    2. Primary hypertension    Other orders  -     Discontinue: atorvastatin (LIPITOR) 40 MG tablet; Take 1 tablet by mouth Daily.  Dispense: 90 tablet; Refill: 3  -     atorvastatin (LIPITOR) 40 MG tablet; Take 1 tablet by mouth Daily.  Dispense: 90 tablet; Refill: 3           Return in about 8 months (around 1/16/2024).  Patient was given instructions and counseling regarding her condition or for health maintenance advice. Please see specific information pulled into the AVS if appropriate.

## 2023-08-29 ENCOUNTER — OFFICE VISIT (OUTPATIENT)
Dept: CARDIOLOGY | Facility: CLINIC | Age: 78
End: 2023-08-29
Payer: MEDICARE

## 2023-08-29 VITALS
HEART RATE: 47 BPM | OXYGEN SATURATION: 97 % | SYSTOLIC BLOOD PRESSURE: 136 MMHG | HEIGHT: 64 IN | WEIGHT: 156.6 LBS | BODY MASS INDEX: 26.73 KG/M2 | DIASTOLIC BLOOD PRESSURE: 61 MMHG

## 2023-08-29 DIAGNOSIS — I10 PRIMARY HYPERTENSION: ICD-10-CM

## 2023-08-29 DIAGNOSIS — R42 DIZZINESS: ICD-10-CM

## 2023-08-29 DIAGNOSIS — I25.10 CHRONIC CORONARY ARTERY DISEASE: Primary | ICD-10-CM

## 2023-08-29 PROCEDURE — 1160F RVW MEDS BY RX/DR IN RCRD: CPT | Performed by: INTERNAL MEDICINE

## 2023-08-29 PROCEDURE — 1159F MED LIST DOCD IN RCRD: CPT | Performed by: INTERNAL MEDICINE

## 2023-08-29 PROCEDURE — 3075F SYST BP GE 130 - 139MM HG: CPT | Performed by: INTERNAL MEDICINE

## 2023-08-29 PROCEDURE — 3078F DIAST BP <80 MM HG: CPT | Performed by: INTERNAL MEDICINE

## 2023-08-29 PROCEDURE — 99213 OFFICE O/P EST LOW 20 MIN: CPT | Performed by: INTERNAL MEDICINE

## 2023-08-29 NOTE — PROGRESS NOTES
Progress note      Name: Alfreda Gruber ADMIT: (Not on file)   : 1945  PCP: Chika Islas MD    MRN: [id number] LOS: 0 days   AGE/SEX: 78 y.o. female  ROOM: Room/bed info not found     Chief Complaint   Patient presents with    Follow-up     8 mo       Subjective       History of present illness  Alfreda Gruber is an 78-year-old female patient who has history of coronary artery disease status post PCI due to MI in , has COPD, ongoing smoking, here today for follow-up.  She is doing well denies having any chest pain or shortness of breath, no palpitations no lower extremity edema no syncopal episodes.    Past Medical History:   Diagnosis Date    Acute coronary syndrome     CAD (coronary artery disease) 2015    Cardiomyopathy     COPD (chronic obstructive pulmonary disease)     COVID 2022    Dyspnea     GERD (gastroesophageal reflux disease)     Glaucoma     Hx of non-ST elevation myocardial infarction (NSTEMI)      Past Surgical History:   Procedure Laterality Date    CARDIAC SURGERY  2015    Heart Cath    CORONARY ANGIOPLASTY WITH STENT PLACEMENT  2015    STACEY: proximal RCA    TUMOR REMOVAL      from stomach     Family History   Problem Relation Age of Onset    Breast cancer Mother     Lung cancer Father     Heart disease Sister     Diabetes Sister     Stroke Sister     Heart attack Sister     Diabetes Paternal Grandfather      Social History     Tobacco Use    Smoking status: Every Day     Packs/day: 0.50     Types: Cigarettes     Start date:      Passive exposure: Past    Smokeless tobacco: Never   Vaping Use    Vaping Use: Never used   Substance Use Topics    Alcohol use: No    Drug use: Never       Current Outpatient Medications:     albuterol sulfate  (90 Base) MCG/ACT inhaler, Inhale 2 puffs Every 4 (Four) Hours As Needed for Wheezing or Shortness of Air., Disp: 6.7 g, Rfl: 2    aspirin 81 MG EC tablet, Take 1 tablet by mouth Daily., Disp: ,  Rfl:     atorvastatin (LIPITOR) 40 MG tablet, Take 1 tablet by mouth Daily., Disp: 90 tablet, Rfl: 3    fluticasone (FLONASE) 50 MCG/ACT nasal spray, 2 sprays into the nostril(s) as directed by provider Daily As Needed for Rhinitis., Disp: , Rfl:     latanoprost (XALATAN) 0.005 % ophthalmic solution, Administer 1 drop to both eyes Every Night., Disp: , Rfl:     metoprolol tartrate (LOPRESSOR) 25 MG tablet, Take 0.5 tablets by mouth 2 (Two) Times a Day., Disp: 180 tablet, Rfl: 2  Allergies:  Penicillins      Physical Exam  VITALS REVIEWED    General:      well developed, in no acute distress.    Head:      normocephalic and atraumatic.    Eyes:      PERRL/EOM intact, conjunctiva and sclera clear with out nystagmus.    Neck:      no masses, thyromegaly,  trachea central with normal respiratory effort and PMI displaced laterally  Lungs:      Clear to auscultation bilaterally  Heart:       Regular rate and rhythm, bradycardic  Msk:      no deformity or scoliosis noted of thoracic or lumbar spine.    Pulses:      pulses normal in all 4 extremities.    Extremities:       No lower extremity edema  Neurologic:      no focal deficits.   alert oriented x3  Skin:      intact without lesions or rashes.    Psych:      alert and cooperative; normal mood and affect; normal attention span and concentration.      Result Review :               Pertinent cardiac workup    EKG 4/15/2023 sinus rhythm 57 bpm      Procedures        Assessment and Plan      Alfreda Gruber is an 78-year-old female patient who has history of coronary artery disease as detailed above, patient denies any anginal symptoms or CHF symptoms, today she is bradycardic at 47 bpm.  EKG on smart watch however showed sinus bradycardia.  We will decrease metoprolol to 12.5 twice a day.  Otherwise no changes no need for ischemic work-up at this time, we will see her in follow-up in 6 months with an EKG.    Diagnoses and all orders for this visit:    1. Chronic coronary  artery disease (Primary)    2. Dizziness    3. Primary hypertension    Other orders  -     metoprolol tartrate (LOPRESSOR) 25 MG tablet; Take 0.5 tablets by mouth 2 (Two) Times a Day.  Dispense: 180 tablet; Refill: 2           Return in about 6 months (around 2/29/2024), or with ekg.  Patient was given instructions and counseling regarding her condition or for health maintenance advice. Please see specific information pulled into the AVS if appropriate.

## 2023-10-12 ENCOUNTER — HOSPITAL ENCOUNTER (OUTPATIENT)
Dept: GENERAL RADIOLOGY | Facility: HOSPITAL | Age: 78
Discharge: HOME | End: 2023-10-12
Admitting: FAMILY MEDICINE
Payer: MEDICARE

## 2023-10-12 ENCOUNTER — OFFICE VISIT (OUTPATIENT)
Dept: FAMILY MEDICINE CLINIC | Facility: CLINIC | Age: 78
End: 2023-10-12
Payer: MEDICARE

## 2023-10-12 ENCOUNTER — LAB (OUTPATIENT)
Dept: FAMILY MEDICINE CLINIC | Facility: CLINIC | Age: 78
End: 2023-10-12
Payer: MEDICARE

## 2023-10-12 VITALS
BODY MASS INDEX: 26.46 KG/M2 | OXYGEN SATURATION: 96 % | DIASTOLIC BLOOD PRESSURE: 85 MMHG | WEIGHT: 155 LBS | TEMPERATURE: 97.6 F | HEIGHT: 64 IN | SYSTOLIC BLOOD PRESSURE: 153 MMHG | HEART RATE: 55 BPM

## 2023-10-12 DIAGNOSIS — L21.9 SEBORRHEIC DERMATITIS: ICD-10-CM

## 2023-10-12 DIAGNOSIS — M62.838 TRAPEZIUS MUSCLE SPASM: ICD-10-CM

## 2023-10-12 DIAGNOSIS — M25.552 LEFT HIP PAIN: ICD-10-CM

## 2023-10-12 DIAGNOSIS — I10 PRIMARY HYPERTENSION: ICD-10-CM

## 2023-10-12 DIAGNOSIS — Z00.00 ENCOUNTER FOR ANNUAL WELLNESS EXAM IN MEDICARE PATIENT: Primary | ICD-10-CM

## 2023-10-12 DIAGNOSIS — E78.5 DYSLIPIDEMIA: ICD-10-CM

## 2023-10-12 DIAGNOSIS — Z23 NEED FOR INFLUENZA VACCINATION: ICD-10-CM

## 2023-10-12 LAB
CHOLEST SERPL-MCNC: 131 MG/DL (ref 0–200)
HDLC SERPL-MCNC: 60 MG/DL (ref 40–60)
LDLC SERPL CALC-MCNC: 56 MG/DL (ref 0–100)
LDLC/HDLC SERPL: 0.93 {RATIO}
TRIGL SERPL-MCNC: 76 MG/DL (ref 0–150)
VLDLC SERPL-MCNC: 15 MG/DL (ref 5–40)

## 2023-10-12 PROCEDURE — 80061 LIPID PANEL: CPT | Performed by: FAMILY MEDICINE

## 2023-10-12 PROCEDURE — 36415 COLL VENOUS BLD VENIPUNCTURE: CPT

## 2023-10-12 PROCEDURE — 73502 X-RAY EXAM HIP UNI 2-3 VIEWS: CPT

## 2023-10-12 NOTE — PROGRESS NOTES
The ABCs of the Annual Wellness Visit  Initial Medicare Wellness Visit    Subjective     Alfreda Gruber is a 78 y.o. female who presents for an Initial Medicare Wellness Visit.    The following portions of the patient's history were reviewed and   updated as appropriate: allergies, current medications, past family history, past medical history, past social history, past surgical history, and problem list.     Compared to one year ago, the patient feels her physical   health is worse.    Compared to one year ago, the patient feels her mental   health is the same.    Recent Hospitalizations:  This patient has had a [facility] admission record on file within the last 365 days.    Current Medical Providers:  Patient Care Team:  Chika Islas MD as PCP - General  ChemchirianGomez MD as Consulting Physician (Cardiology)    Outpatient Medications Prior to Visit   Medication Sig Dispense Refill    albuterol sulfate  (90 Base) MCG/ACT inhaler Inhale 2 puffs Every 4 (Four) Hours As Needed for Wheezing or Shortness of Air. 6.7 g 2    aspirin 81 MG EC tablet Take 1 tablet by mouth Daily.      atorvastatin (LIPITOR) 40 MG tablet Take 1 tablet by mouth Daily. 90 tablet 3    fluticasone (FLONASE) 50 MCG/ACT nasal spray 2 sprays into the nostril(s) as directed by provider Daily As Needed for Rhinitis.      latanoprost (XALATAN) 0.005 % ophthalmic solution Administer 1 drop to both eyes Every Night.      metoprolol tartrate (LOPRESSOR) 25 MG tablet Take 0.5 tablets by mouth 2 (Two) Times a Day. 180 tablet 2     No facility-administered medications prior to visit.       No opioid medication identified on active medication list. I have reviewed chart for other potential  high risk medication/s and harmful drug interactions in the elderly.        Aspirin is on active medication list. Aspirin use is indicated based on review of current medical condition/s. Pros and cons of this therapy have been  "discussed today. Benefits of this medication outweigh potential harm.  Patient has been encouraged to continue taking this medication.  .      Patient Active Problem List   Diagnosis    Cardiomyopathy    Chronic coronary artery disease    Chronic obstructive pulmonary disease    Encounter for annual wellness exam in Medicare patient    Gastroesophageal reflux disease    Hearing loss    Hypertension    Osteoporosis    Other screening mammogram    Status post percutaneous transluminal coronary angioplasty    Tobacco use    Varicose veins of lower extremity    Dyslipidemia    Total, mature senile cataract    Primary open angle glaucoma    Presbyopia    Nuclear senile cataract    Posterior capsule opacification    Dizziness     Advance Care Planning   Advance Care Planning     Advance Directive is on file.  ACP discussion was held with the patient during this visit. Patient has an advance directive in EMR which is still valid.        Objective    Vitals:    10/12/23 1220   BP: 153/85   BP Location: Left arm   Patient Position: Sitting   Cuff Size: Large Adult   Pulse: 55   Temp: 97.6 °F (36.4 °C)   TempSrc: Temporal   SpO2: 96%   Weight: 70.3 kg (155 lb)   Height: 162.6 cm (64\")   PainSc: 0-No pain   PainLoc: Hip     Estimated body mass index is 26.61 kg/m² as calculated from the following:    Height as of this encounter: 162.6 cm (64\").    Weight as of this encounter: 70.3 kg (155 lb).    BMI is >= 25 and <30. (Overweight) The following options were offered after discussion;: nutrition counseling/recommendations      Does the patient have evidence of cognitive impairment?   No  MMSE done 30/30  Lab Results   Component Value Date    TRIG 76 10/12/2023    HDL 60 10/12/2023    LDL 56 10/12/2023    VLDL 15 10/12/2023        HEALTH RISK ASSESSMENT    Smoking Status:  Social History     Tobacco Use   Smoking Status Every Day    Packs/day: .5    Types: Cigarettes    Start date: 1973    Passive exposure: Past   Smokeless " Tobacco Never     Alcohol Consumption:  Social History     Substance and Sexual Activity   Alcohol Use No     Fall Risk Screen:    SEJALADI Fall Risk Assessment was completed, and patient is at MODERATE risk for falls. Assessment completed on:10/12/2023    Depression Screen:       10/12/2023    12:23 PM   PHQ-2/PHQ-9 Depression Screening   Little Interest or Pleasure in Doing Things 0-->not at all   Feeling Down, Depressed or Hopeless 0-->not at all   PHQ-9: Brief Depression Severity Measure Score 0       Health Habits and Functional and Cognitive Screening:      10/12/2023    12:25 PM   Functional & Cognitive Status   Do you have difficulty preparing food and eating? No   Do you have difficulty bathing yourself, getting dressed or grooming yourself? No   Do you have difficulty using the toilet? No   Do you have difficulty moving around from place to place? No   Do you have trouble with steps or getting out of a bed or a chair? No   Current Diet Well Balanced Diet   Dental Exam Not up to date   Eye Exam Up to date   Exercise (times per week) 4 times per week   Current Exercises Include House Cleaning;Yard Work   Do you need help using the phone?  No   Are you deaf or do you have serious difficulty hearing?  Yes   Do you need help to go to places out of walking distance? No   Do you need help shopping? No   Do you need help preparing meals?  No   Do you need help with housework?  No   Do you need help with laundry? No   Do you need help taking your medications? No   Do you need help managing money? No   Do you ever drive or ride in a car without wearing a seat belt? No   Have you felt unusual stress, anger or loneliness in the last month? No   Who do you live with? Spouse   If you need help, do you have trouble finding someone available to you? No   Have you been bothered in the last four weeks by sexual problems? No   Do you have difficulty concentrating, remembering or making decisions? No       Age-appropriate  Screening Schedule:  Refer to the list below for future screening recommendations based on patient's age, sex and/or medical conditions. Orders for these recommended tests are listed in the plan section. The patient has been provided with a written plan.    Health Maintenance   Topic Date Due    BMI FOLLOWUP  Never done    ZOSTER VACCINE (1 of 2) Never done    DXA SCAN  07/31/2019    COVID-19 Vaccine (5 - 2023-24 season) 09/01/2023    ANNUAL WELLNESS VISIT  10/12/2024    TDAP/TD VACCINES (2 - Td or Tdap) 06/14/2031    INFLUENZA VACCINE  Completed    Pneumococcal Vaccine 65+  Completed        Flu shot given  Counseled on weight loss given  Counseled on the need for smoking cessation    CMS Preventative Services Quick Reference  Risk Factors Identified During Encounter    Immunizations Discussed/Encouraged: Influenza    The above risks/problems have been discussed with the patient.  Pertinent information has been shared with the patient in the After Visit Summary.  An After Visit Summary and PPPS were made available to the patient.  Diagnoses and all orders for this visit:    1. Encounter for annual wellness exam in Medicare patient (Primary)    2. Primary hypertension  -     Comprehensive Metabolic Panel; Future  -     Lipid Panel; Future    3. Dyslipidemia  -     Comprehensive Metabolic Panel; Future  -     Lipid Panel; Future    4. Left hip pain  -     XR Hip With or Without Pelvis 2 - 3 View Left; Future    5. Need for influenza vaccination  -     Fluzone High-Dose 65+yrs ([id number])    6. Seborrheic dermatitis    7. Trapezius muscle spasm    Other orders  -     SCANNED COGNITIVE ASSESSMENT      Follow Up:  Next Medicare Wellness visit to be scheduled in 1 year.        Additional E&M Note during same encounter follows:  Patient has multiple medical problems which are significant and separately identifiable that require additional work above and beyond the Medicare Wellness Visit.      Chief Complaint  Medicare  "Wellness-subsequent (Wants to discuss getting flu shot or not), Hip Pain (Left hip pain x4-5 months. No known Injury), Neck Pain (Right side of her neck daily. Hurts when turning her head to the right. ), and Dizziness (Had a few dizzy spells. First back in April and was seen in ER. And now had another a few days ago. Occurred when she woke up in the morning and lasts all day. )    Subjective        HPI  Alfreda Gruber is also being seen today for follow up on BP and chol  Some dizziness spells  Was seen in the ER initially  Had another episode a few days ago  Took meclizine which helped  No further episodes  When they occur, they will last all day  Left hip pain for 4-5 months  Worse with ambulation  Denies injury  Right side of her neck hurts  Limits her ROM  Hurts to turn head to the right  Several months         Objective   Vital Signs:  /85 (BP Location: Left arm, Patient Position: Sitting, Cuff Size: Large Adult)   Pulse 55   Temp 97.6 °F (36.4 °C) (Temporal)   Ht 162.6 cm (64\")   Wt 70.3 kg (155 lb)   SpO2 96%   BMI 26.61 kg/m²     Physical Exam  Vitals and nursing note reviewed.   Constitutional:       Appearance: Normal appearance. She is overweight.   HENT:      Head: Normocephalic and atraumatic.      Right Ear: Tympanic membrane, ear canal and external ear normal.      Left Ear: Tympanic membrane, ear canal and external ear normal.   Neck:      Comments: Trapezius spasm bilat  Cardiovascular:      Rate and Rhythm: Normal rate and regular rhythm.      Heart sounds: Normal heart sounds.   Pulmonary:      Effort: Pulmonary effort is normal.      Breath sounds: Normal breath sounds.   Musculoskeletal:      Left hip: Tenderness present. No deformity. Normal range of motion.      Right lower leg: No edema.      Left lower leg: No edema.   Skin:     Comments: 2 small seb derm on her back   Neurological:      Mental Status: She is alert and oriented to person, place, and time.      " Coordination: Coordination is intact.   Psychiatric:         Mood and Affect: Mood normal.         Behavior: Behavior is cooperative.                         Assessment and Plan   Diagnoses and all orders for this visit:    1. Encounter for annual wellness exam in Medicare patient (Primary)    2. Primary hypertension  -     Comprehensive Metabolic Panel; Future  -     Lipid Panel; Future    3. Dyslipidemia  -     Comprehensive Metabolic Panel; Future  -     Lipid Panel; Future    4. Left hip pain  -     XR Hip With or Without Pelvis 2 - 3 View Left; Future    5. Need for influenza vaccination  -     Fluzone High-Dose 65+yrs ([id number])    6. Seborrheic dermatitis    7. Trapezius muscle spasm    Other orders  -     SCANNED COGNITIVE ASSESSMENT    She will continue current meds for bp and chol  Labs ordered  Reassured regarding seb derm  Will send for x-ray of left hip  Recommended hot showers and warm compresses for trapezius spasm           Follow Up   Return in about 1 year (around 10/12/2024) for Recheck, Medicare Wellness.  Patient was given instructions and counseling regarding her condition or for health maintenance advice. Please see specific information pulled into the AVS if appropriate.

## 2023-10-12 NOTE — PATIENT INSTRUCTIONS
Keep working to lose weight through healthy eating and exercise.   Stop smoking  Hot showers and warm compresses to neck and shoulders.

## 2023-10-13 NOTE — PROGRESS NOTES
Left message to return call to doctor's office at [facility]. Either to get test results or message from provider.

## 2023-10-16 ENCOUNTER — LAB (OUTPATIENT)
Dept: FAMILY MEDICINE CLINIC | Facility: CLINIC | Age: 78
End: 2023-10-16
Payer: MEDICARE

## 2023-10-16 ENCOUNTER — TELEPHONE (OUTPATIENT)
Dept: FAMILY MEDICINE CLINIC | Facility: CLINIC | Age: 78
End: 2023-10-16
Payer: MEDICARE

## 2023-10-16 DIAGNOSIS — M25.552 LEFT HIP PAIN: Primary | ICD-10-CM

## 2023-10-16 DIAGNOSIS — M16.12 OSTEOARTHRITIS OF LEFT HIP, UNSPECIFIED OSTEOARTHRITIS TYPE: ICD-10-CM

## 2023-10-16 DIAGNOSIS — I10 PRIMARY HYPERTENSION: ICD-10-CM

## 2023-10-16 LAB
ALBUMIN SERPL-MCNC: 4.6 G/DL (ref 3.5–5.2)
ALBUMIN/GLOB SERPL: 1.6 G/DL
ALP SERPL-CCNC: 151 U/L (ref 39–117)
ALT SERPL W P-5'-P-CCNC: 16 U/L (ref 1–33)
ANION GAP SERPL CALCULATED.3IONS-SCNC: 7 MMOL/L (ref 5–15)
AST SERPL-CCNC: 19 U/L (ref 1–32)
BILIRUB SERPL-MCNC: 0.6 MG/DL (ref 0–1.2)
BUN SERPL-MCNC: 11 MG/DL (ref 8–23)
BUN/CREAT SERPL: 13.9 (ref 7–25)
CALCIUM SPEC-SCNC: 10.4 MG/DL (ref 8.6–10.5)
CHLORIDE SERPL-SCNC: 100 MMOL/L (ref 98–107)
CO2 SERPL-SCNC: 31 MMOL/L (ref 22–29)
CREAT SERPL-MCNC: 0.79 MG/DL (ref 0.57–1)
EGFRCR SERPLBLD CKD-EPI 2021: 75.7 ML/MIN/1.73
GLOBULIN UR ELPH-MCNC: 2.9 GM/DL
GLUCOSE SERPL-MCNC: 100 MG/DL (ref 65–99)
POTASSIUM SERPL-SCNC: 5 MMOL/L (ref 3.5–5.2)
PROT SERPL-MCNC: 7.5 G/DL (ref 6–8.5)
SODIUM SERPL-SCNC: 138 MMOL/L (ref 136–145)

## 2023-10-16 PROCEDURE — 36415 COLL VENOUS BLD VENIPUNCTURE: CPT

## 2023-10-16 PROCEDURE — 80053 COMPREHEN METABOLIC PANEL: CPT | Performed by: FAMILY MEDICINE

## 2023-10-16 NOTE — TELEPHONE ENCOUNTER
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  I don't see a referral in the system for this.

## 2023-10-16 NOTE — PROGRESS NOTES
Patient notified of test results. Also that she would need to come back in and have her CMP redrawn. Patient verbalize understanding.

## 2023-10-17 ENCOUNTER — TELEPHONE (OUTPATIENT)
Dept: FAMILY MEDICINE CLINIC | Facility: CLINIC | Age: 78
End: 2023-10-17

## 2023-10-17 NOTE — TELEPHONE ENCOUNTER
Caller: Alfreda Gruber    Relationship: Self    Best call back number: [phone]     Caller requesting test results: ALFREDA    What test was performed: LAB    When was the test performed: 10/16/23    Where was the test performed: [facility]    Additional notes:

## 2023-10-23 ENCOUNTER — TELEPHONE (OUTPATIENT)
Dept: FAMILY MEDICINE CLINIC | Facility: CLINIC | Age: 78
End: 2023-10-23
Payer: MEDICARE

## 2023-10-23 NOTE — TELEPHONE ENCOUNTER
Patient call to see if she had to see more than one provider for her referral on her hip. I explain that we only sent out a referral for Ortho only. She was sure what the other message was about and wanted to make sure everything was okay including her lab work.

## 2023-11-01 ENCOUNTER — OFFICE VISIT (OUTPATIENT)
Dept: ORTHOPEDIC SURGERY | Facility: CLINIC | Age: 78
End: 2023-11-01
Payer: MEDICARE

## 2023-11-01 VITALS — WEIGHT: 154.8 LBS | BODY MASS INDEX: 26.43 KG/M2 | HEIGHT: 64 IN

## 2023-11-01 DIAGNOSIS — M25.552 LEFT HIP PAIN: Primary | ICD-10-CM

## 2023-11-01 DIAGNOSIS — G89.29 CHRONIC RADICULAR LUMBAR PAIN: ICD-10-CM

## 2023-11-01 DIAGNOSIS — M54.16 CHRONIC RADICULAR LUMBAR PAIN: ICD-10-CM

## 2023-11-01 PROCEDURE — 99203 OFFICE O/P NEW LOW 30 MIN: CPT | Performed by: ORTHOPAEDIC SURGERY

## 2023-11-01 NOTE — PROGRESS NOTES
"Chief Complaint  Establish Care of the Left Hip    Subjective    History of Present Illness      Alfreda Gruber is a 78 y.o. female who presents to [facility] for lumbar spine pain with hip pain and discomfort.  History of Present Illness the patient has fairly classic symptoms of radiculopathy.  His pain starts in the lumbar spine and radiates into the hip buttock and thigh.  She has difficulty with turning over in bed at night.  She has pain with cross body activities.  Symptoms have been going on for about 6 months.  She seems to have classic symptoms of neurogenic claudication coming from the lumbar spine.  She denies any history of trauma to the hip.  There are no risk factors for avascular necrosis the patient is 78 years old and would prefer not to have a surgical intervention especially something as significant as a hip arthroplasty.  Pain Location: LEFT hip  Radiation:  From the lumbar spine into the hip buttock and thigh.  Quality: dull, aching  Intensity/Severity: moderate  Duration:  6 months  Progression of symptoms: yes, progressive worsening  Onset quality: gradual   Timing: constant when walking  Aggravating Factors: going up and down stairs, kneeling, walking, squatting, standing  Alleviating Factors: Tylenol, rest  Previous Episodes: none mentioned  Associated Symptoms: pain, decreased ROM, decreased strength, numbness/tingling  ADLs Affected: grooming/hygiene/toileting/personal care, dressing, ambulating, recreational activities/sports  Previous Treatment: Tylenol       Objective   Vital Signs:   Ht 162.6 cm (64\")   Wt 70.2 kg (154 lb 12.8 oz)   BMI 26.57 kg/m²     Physical Exam  Physical Exam  Vitals signs and nursing note reviewed.   Constitutional:       Appearance: Normal appearance.   Pulmonary:      Effort: Pulmonary effort is normal.   Skin:     General: Skin is warm and dry.      Capillary Refill: Capillary refill takes less than 2 seconds. "   Neurological:      General: No focal deficit present.      Mental Status: He is alert and oriented to person, place, and time. Mental status is at baseline.   Psychiatric:         Mood and Affect: Mood normal.         Behavior: Behavior normal.         Thought Content: Thought content normal.         Judgment: Judgment normal.     Ortho Exam   Left SI JOINT: Mild tenderness is present dorsally over the SI joint. Figure of 4 sign is positive. There is mild spasm present in the erector spinae muscle. Flexion and extension are associated with discomfort. Deep tendon reflexes are intact and symmetrical on both lower extremities. Pain radiates into the buttock on extension of the hip. No evidence of cauda equina syndrome. No motor or sensory deficit is noted. There is no bowel or bladder involvement.  Left hip (gtb): Skin and soft tissues over the greater trochanteric bursa are tender upon palpation, along with IT band tenderness. Cross body adduction is negative. Internal and external rotations are bothersome and cause tenderness over the greater trochanter. There is no clinical deformity and no shortening. She does not have a limp upon walking. There is not piriformis tightness and there  is not capsular tightness with any rotation. Dorsalis pedis and posterior tibial artery pulses are palpable. Neurovascular status is intact and capillary refill is less than 2 seconds. Common peroneal nerve function is well preserved.            Result Review :  The following data was reviewed by: Robert Masterson MD on 11/01/2023:  Radiologic studies - see below for interpretation  LEFT hip with pelvis xrays  weightbearing/standing ap/lateral views were performed at [facility] on 10/12/23. Images were independently viewed and interpreted by myself, my impression as follows:      left Hip X-Ray  Indication: Evaluation of pain and discomfort in the left hip and groin.  AP and lateral  Findings: Early to moderate narrowing of the hip  joint space is noted.  There is some subchondral sclerosis over the acetabulum.  No femoral head collapse is noted.  There is no evidence of avascular necrosis.  no bony lesion  Soft tissues within normal limits  decreased joint spaces  Hardware appropriately positioned not applicable      no prior studies available for comparison.    X-RAY was ordered and reviewed by Robert Masterson MD         Procedures           Assessment   Assessment and Plan    Diagnoses and all orders for this visit:    1. Left hip pain (Primary)  -     Ambulatory Referral to Orthopedic Surgery    2. Chronic radicular lumbar pain          Follow Up   Calcium and vitamin D for bone health.  Use a cane for assistance with ambulation.  Discussed with the patient the pathology of the pain with assisted walking and the symptoms of neurogenic claudication.  I am referring this patient to my nonoperative orthopedic partner Dr. Carlos Eduardo Henao for intra-articular hip injection under ultrasound guidance to help symptom management.  She is not a candidate for total hip arthroplasty at this point.  Rest, ice, compression, and elevation (RICE) therapy  Stretching and strengthening exercises  OTC Alternate Ibuprofen and Tylenol as needed  Follow up in 3 month(s)  Patient was given instructions and counseling regarding her condition or for health maintenance advice. Please see specific information pulled into the AVS if appropriate.     Robert Masterson MD   Date of Encounter: 11/1/2023   Electronically signed by Robert Masterson MD, 11/01/23, 9:15 AM EDT.     EMR Dragon/Transcription disclaimer:  Much of this encounter note is an electronic transcription/translation of spoken language to printed text. The electronic translation of spoken language may permit erroneous, or at times, nonsensical words or phrases to be inadvertently transcribed; Although I have reviewed the note for such errors, some may still exist.

## 2023-11-15 ENCOUNTER — OFFICE VISIT (OUTPATIENT)
Dept: ORTHOPEDIC SURGERY | Facility: CLINIC | Age: 78
End: 2023-11-15
Payer: MEDICARE

## 2023-11-15 VITALS — OXYGEN SATURATION: 99 % | BODY MASS INDEX: 27.31 KG/M2 | WEIGHT: 160 LBS | HEIGHT: 64 IN | HEART RATE: 73 BPM

## 2023-11-15 DIAGNOSIS — M76.02 GLUTEAL TENDINITIS OF LEFT BUTTOCK: Primary | ICD-10-CM

## 2023-11-15 RX ORDER — TRIAMCINOLONE ACETONIDE 40 MG/ML
80 INJECTION, SUSPENSION INTRA-ARTICULAR; INTRAMUSCULAR
Status: COMPLETED | OUTPATIENT
Start: 2023-11-15 | End: 2023-11-15

## 2023-11-15 RX ADMIN — TRIAMCINOLONE ACETONIDE 80 MG: 40 INJECTION, SUSPENSION INTRA-ARTICULAR; INTRAMUSCULAR at 17:47

## 2023-11-15 NOTE — PROGRESS NOTES
Procedure   Large Joint Arthrocentesis: L greater trochanteric bursa  Date/Time: 11/15/2023 5:47 PM  Consent given by: patient  Site marked: site marked  Timeout: Immediately prior to procedure a time out was called to verify the correct patient, procedure, equipment, support staff and site/side marked as required   Supporting Documentation  Indications: pain   Procedure Details  Location: hip - L greater trochanteric bursa  Preparation: Patient was prepped and draped in the usual sterile fashion  Needle size: 25 G  Approach: lateral (direct)  Medications administered: 80 mg triamcinolone acetonide 40 MG/ML (2cc 1% lidocaine without epinepherine, and 2cc of 40mg of Kenelog)  Patient tolerance: patient tolerated the procedure well with no immediate complications (Blood loss negligable, pt admits to near immediate pain relief with gentle ROM/ambulation post injection.)

## 2023-11-15 NOTE — PROGRESS NOTES
Primary Care Sports Medicine Office Visit Note     Patient ID: Alfreda Gruber is a 78 y.o. female.    Chief Complaint:  Chief Complaint   Patient presents with    Left Hip - Initial Evaluation, Pain     HPI:    Ms. Alfreda Gruber is a 78 y.o. female. The patient presents to the clinic today for a new left hip pain evaluation.    The patient complains of left hip pain. When she ambulates, the pain becomes so severe that she must stop. If she stands or sits for a while, the pain eases up and she can ambulate a little further. Her family doctor referred her to Dr. Masterson a couple of weeks ago, and he told her she had arthritis in her back. They did not think it was surgery related. The x-rays showed arthritis in her back, and he thought it might be a pinched nerve in her hip from the back. She denies any pain in her back.    Past Medical History:   Diagnosis Date    Acute coronary syndrome 2015    CAD (coronary artery disease) 02/27/2015    Cardiomyopathy     COPD (chronic obstructive pulmonary disease)     COVID 07/2022    Dyspnea     GERD (gastroesophageal reflux disease)     Glaucoma     Hx of non-ST elevation myocardial infarction (NSTEMI) 2015       Past Surgical History:   Procedure Laterality Date    CARDIAC SURGERY  02/27/2015    Heart Cath    CORONARY ANGIOPLASTY WITH STENT PLACEMENT  02/27/2015    STCAEY: proximal RCA    TUMOR REMOVAL      from stomach       Family History   Problem Relation Age of Onset    Breast cancer Mother     Lung cancer Father     Heart disease Sister     Diabetes Sister     Stroke Sister     Heart attack Sister     Diabetes Paternal Grandfather      Social History     Occupational History    Not on file   Tobacco Use    Smoking status: Every Day     Packs/day: .5     Types: Cigarettes     Start date: 1973     Passive exposure: Past    Smokeless tobacco: Never   Vaping Use    Vaping Use: Never used   Substance and Sexual Activity    Alcohol use: No    Drug use: Never    Sexual  "activity: Defer      Review of Systems   Constitutional:  Negative for activity change and fever.   Musculoskeletal:  Positive for arthralgias.   Skin:  Negative for color change and rash.   Neurological:  Negative for weakness.     Objective:    Pulse 73   Ht 162.6 cm (64\")   Wt 72.6 kg (160 lb)   LMP  (LMP Unknown)   SpO2 99%   BMI 27.46 kg/m²     Physical Examination:  Physical Exam  Vitals and nursing note reviewed.   Constitutional:       General: She is not in acute distress.     Appearance: She is well-developed. She is not diaphoretic.   HENT:      Head: Normocephalic and atraumatic.   Eyes:      Conjunctiva/sclera: Conjunctivae normal.   Pulmonary:      Effort: Pulmonary effort is normal. No respiratory distress.   Musculoskeletal:      Lumbar back: Negative left straight leg raise test.   Skin:     General: Skin is warm.      Capillary Refill: Capillary refill takes less than 2 seconds.   Neurological:      Mental Status: She is alert.       Left Knee Exam     Range of Motion   The patient has normal left knee ROM.      Left Hip Exam     Tenderness   The patient is experiencing tenderness in the greater trochanter.    Range of Motion   The patient has normal left hip ROM.    Muscle Strength   The patient has normal left hip strength.     Tests   GIDEON: negative  Fadir:  Negative FADIR test    Other   Erythema: absent  Sensation: normal  Pulse: present    Comments:  Neg log roll, neg stenchfield, neg scour.      Back Exam     Tenderness   The patient is experiencing no tenderness.     Range of Motion   The patient has normal back ROM.    Muscle Strength   The patient has normal back strength.  Right Quadriceps:  5/5   Right Hamstrings:  5/5     Tests   Straight leg raise left: negative    Reflexes   Patellar:  normal    Other   Sensation: normal  Erythema: no back redness          Imaging and other tests:      Assessment and Plan:    1. Gluteal tendinitis of left buttock  - Ambulatory Referral to " Physical Therapy Evaluate and treat; Stretching, ROM, Strengthening    After discussion of risks and benefits, the patient elected to proceed with corticosteroid injection to the left greater trochanteric bursa. The patient tolerated this procedure well without any complaints or problems. I recommended continuation of conservative management as previously, return to clinic in 3-6 months when symptoms return. We also discussed physical therapy, stretching and strengthening of the gluteal musculature. Return to clinic in 3 months.    Transcribed from ambient dictation for Mikey Henao II,  by Felicity Pillai.  11/15/23   13:48 EST    Patient or patient representative verbalized consent to the visit recording.  I have personally performed the services described in this document as transcribed by the above individual, and it is both accurate and complete.    Disclaimer: Please note that areas of this note were completed with computer voice recognition software.  Quite often unanticipated grammatical, syntax, homophones, and other interpretive errors are inadvertently transcribed by the computer software. Please excuse any errors that have escaped final proofreading.

## 2023-11-17 PROBLEM — G89.29 CHRONIC RADICULAR LUMBAR PAIN: Status: ACTIVE | Noted: 2023-11-17

## 2023-11-17 PROBLEM — M54.16 CHRONIC RADICULAR LUMBAR PAIN: Status: ACTIVE | Noted: 2023-11-17

## 2023-11-21 ENCOUNTER — TREATMENT (OUTPATIENT)
Dept: PHYSICAL THERAPY | Facility: CLINIC | Age: 78
End: 2023-11-21
Payer: MEDICARE

## 2023-11-21 DIAGNOSIS — M76.02 GLUTEAL TENDONITIS OF LEFT BUTTOCK: Primary | ICD-10-CM

## 2023-11-21 DIAGNOSIS — R26.2 DIFFICULTY WALKING: ICD-10-CM

## 2023-11-21 DIAGNOSIS — M25.552 LEFT HIP PAIN: ICD-10-CM

## 2023-11-21 PROCEDURE — 97162 PT EVAL MOD COMPLEX 30 MIN: CPT | Performed by: PHYSICAL THERAPIST

## 2023-11-21 PROCEDURE — 97110 THERAPEUTIC EXERCISES: CPT | Performed by: PHYSICAL THERAPIST

## 2023-11-21 PROCEDURE — 97140 MANUAL THERAPY 1/> REGIONS: CPT | Performed by: PHYSICAL THERAPIST

## 2023-11-21 NOTE — PROGRESS NOTES
Physical Therapy Initial Evaluation and Plan of Care  [place], [place]    Patient: Alfreda Gruber   : 1945  Diagnosis/ICD-10 Code:  Gluteal tendonitis of left buttock [M76.02]  Referring practitioner: Mikey Henao I*  Date of Initial Visit: 2023  Today's Date: 2023  Patient seen for 1 session           Visit Diagnoses:     ICD-10-CM ICD-9-CM   1. Gluteal tendonitis of left buttock  M76.02 726.5   2. Left hip pain  M25.552 719.45   3. Difficulty walking  R26.2 719.7       Subjective Questionnaire: LEFS: 3080 points    Subjective Evaluation    History of Present Illness  Onset date: about 6-7 months.  Mechanism of injury: Patient presents to physical therapy with orders to address left hip pain and gluteal tendonitis.  She states that she has had trouble with her hip for about 6-7 months.  She states that she was seen by orthopedics recently and given an injection.  She states that the injection was really helpful and she feels like it is getting better every day.  She states that she still has pain when she is walking but it is getting better.  She states that she is limited to her walking distance so she is always trying to park close to the door when she is out.  She does better when leaning on a cart.  She has not tried any assistive devices.  She states that she can no longer work in her yard because of the pain with activity.  She reports some discomfort with getting out of her vehicle.  She reports some difficulty with prolonged positioning in sitting and with sidelying on her L hip in bed.  She denies any falls in the past year.    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  Denies any cardiac pacemaker.      Patient Occupation: Retired   Precautions and Work Restrictions: Hobbies: crafts and sewing, yardworkPain  Current pain rating: 3 (2-3)  At best pain ratin  At worst pain rating: 10 (9-10)  Location: L  posterior hip  Quality: squeezing  Aggravating factors: ambulation, standing, movement and repetitive movement    Social Support  Lives in: one-story house  Lives with: spouse    Treatments  Previous treatment: injection treatment  Current treatment: physical therapy  Patient Goals  Patient goals for therapy: decreased pain, improved balance, increased motion and increased strength  Patient goal: walk further distance       Objective          Static Posture   General Observations  Guarded and scoliosis.     Palpation   Left   Hypertonic in the piriformis.   Tenderness of the gluteus luis, gluteus medius, obturator externus, piriformis and TFL.     Tenderness     Left Hip   Tenderness in the greater trochanter. No tenderness in the PSIS.     Neurological Testing     Sensation     Hip   Left Hip   Intact: light touch    Passive Range of Motion   Left Hip   Flexion: 90 degrees with pain  Extension: 0 degrees   External rotation (90/90): 30 degrees   Internal rotation (90/90): 10 degrees     Right Hip   Flexion: 110 degrees   Extension: 0 degrees   External rotation (90/90): 35 degrees   Internal rotation (90/90): 20 degrees     Strength/Myotome Testing     Left Hip   Planes of Motion   Flexion: 4  Extension: 3  Abduction: 3+  Adduction: 4  External rotation: 3  Internal rotation: 3    Left Knee   Flexion: 4  Extension: 4    Left Ankle/Foot   Dorsiflexion: 4    Ambulation   Weight-Bearing Status   Assistive device used: none    Observational Gait   Gait: antalgic   Decreased walking speed, stride length and left stance time.     Functional Assessment     Single Leg Stance   Left: 0 seconds        Patient Education: Discussed treatment plan and initiated HEP with handout and [employer] access code: [email or web address]    Assessment & Plan       Assessment  Impairments: abnormal coordination, abnormal gait, abnormal or restricted ROM, activity intolerance, impaired balance, impaired physical strength, lacks appropriate home  exercise program, pain with function and weight-bearing intolerance   Functional limitations: walking, uncomfortable because of pain, sitting, standing, stooping and unable to perform repetitive tasks   Assessment details: The patient is a 78 y.o. female who presents to physical therapy today for L gluteal tendonitis and hip pain. Upon initial evaluation, the patient demonstrates the following impairments: moderate hip pain, decreased ROM, decreased flexibility, difficulty with walking, decreased tolerance to prolonged sitting, and has difficulty with position changes. Due to these impairments, the patient is unable to walk long distances without support (>1 block). The patient would benefit from skilled PT services to address functional limitations and impairments and to improve patient quality of life.    Barriers to therapy: COPD, HTN, OP  Prognosis: good    Goals  Plan Goals: STG's: 3 weeks   Patient will report pain level at worse </= 7/10 for improved tolerance to ADLs and functional mobility  Patient will require <3 tactile or verbal cues for proper mechanics during completion of her HEP      LTG's: By Discharge  Patient will report pain levels at worst </= 5/10 for improved tolerance to ADLs and functional mobility  Patient will be able to ambulate unlimited distances without an assistive device and no increased pain  Patient will be able to complete single leg stance on stable surface with no UE support, with supervision for durations > 5 seconds on LLE to decrease risk of falls  Patient will report >75% improvement in her ability to tolerate sitting for durations > 60 minutes per reduction in her symptoms   Patient will report >75% improvement in her ability to tolerate standing for durations >20 minutes to improve tolerance to meal prep and household chores  Patient will report improved levels of overall function as demonstrated by an increase in her reported level of function score on the LEFS to >/=  36/80    Patient will report improvement in their tolerance to sleeping and report waking up </= 1x/night per positional discomfort  Patient will require no tactile or verbal cues for proper mechaics during completion of her HEP for final independence       Plan  Therapy options: will be seen for skilled therapy services  Planned modality interventions: cryotherapy, electrical stimulation/Russian stimulation, TENS and thermotherapy (hydrocollator packs)  Planned therapy interventions: abdominal trunk stabilization, manual therapy, balance/weight-bearing training, neuromuscular re-education, postural training, soft tissue mobilization, flexibility, functional ROM exercises, gait training, home exercise program, strengthening, stretching, joint mobilization and therapeutic activities  Frequency: 2x week  Duration in weeks: 12  Treatment plan discussed with: patient      History # of Personal Factors and/or Comorbidities: MODERATE (1-2)  Examination of Body System(s): # of elements: MODERATE (3)  Clinical Presentation: EVOLVING  Clinical Decision Making: MODERATE      Timed:         Manual Therapy:    10     mins  60614;     Therapeutic Exercise:    15     mins  07798;     Neuromuscular Kory:        mins  65025;    Therapeutic Activity:          mins  61525;     Gait Training:           mins  17288;     Ultrasound:          mins  09049;    Ionto                                   mins   68929  Self Care                            mins   10015    Un-Timed:  Electrical Stimulation:         mins  31797 ( );  Canalith Repos         mins 49523  Dry Needling          mins 26718/20191  Traction          mins 33266  Low Eval          Mins  24581  Mod Eval     20     Mins  37608  High Eval                            Mins  97619    No Charge:   Cryopack                         mins  Moist Heat                       mins        Timed Treatment:   25   mins   Total Treatment:     45   mins        PT SIGNATURE: Ethel King  DAVIE [place] License: [id number]  DATE TREATMENT INITIATED: 11/21/2023    Medicare Initial Certification  Certification Period: 11/21/2023 through 2/18/2024  I certify that the therapy services are furnished while this patient is under my care.  The services outlined above are required by this patient, and will be reviewed every 90 days.      Physician Signature: _________________________  PHYSICIAN: Mikey Henao II, DO   NPI: [id number]                                             DATE: _____________________________________    Please sign and return via fax to [phone]. Thank you, [facility].

## 2023-11-28 ENCOUNTER — TREATMENT (OUTPATIENT)
Dept: PHYSICAL THERAPY | Facility: CLINIC | Age: 78
End: 2023-11-28
Payer: MEDICARE

## 2023-11-28 DIAGNOSIS — R26.2 DIFFICULTY WALKING: ICD-10-CM

## 2023-11-28 DIAGNOSIS — M76.02 GLUTEAL TENDONITIS OF LEFT BUTTOCK: Primary | ICD-10-CM

## 2023-11-28 DIAGNOSIS — M25.552 LEFT HIP PAIN: ICD-10-CM

## 2023-11-28 PROCEDURE — 97110 THERAPEUTIC EXERCISES: CPT | Performed by: PHYSICAL THERAPIST

## 2023-11-28 PROCEDURE — 97140 MANUAL THERAPY 1/> REGIONS: CPT | Performed by: PHYSICAL THERAPIST

## 2023-11-28 NOTE — PROGRESS NOTES
Physical Therapy Daily Treatment Note  [address] [place] [address]    Patient: Alfreda Gruber  : 1945  Referring practitioner: Mikey Henao I*  Date of Initial Visit: Type: THERAPY  Noted: 2023  Today's Date: 2023  Patient seen for 2 sessions      Visit Diagnoses:    ICD-10-CM ICD-9-CM   1. Gluteal tendonitis of left buttock  M76.02 726.5   2. Left hip pain  M25.552 719.45   3. Difficulty walking  R26.2 719.7       VISIT#: 2    Subjective   Alfreda Gruber reports that she is feeling a lot better.  She states that her pain is significantly lower today but couldn't rate it on a 0-10 pain scale.  Pain Rating (0-10): unrated    Objective     See Exercise, Manual, and Modality Logs for complete treatment.     Patient Education: Discussed treatment plan    Assessment & Plan       Assessment  Assessment details: Patient presented with reduction in overall hip pain today.  She had moderate tenderness to palpation over greater trochanter but reported reduction in pain after treatment.   Exercises were progressed for ROM, flexibility, and core strengthening.        Progress per Plan of Care and Progress strengthening /stabilization /functional activity          Timed:         Manual Therapy:    15     mins  29026;     Therapeutic Exercise:    25     mins  98139;     Neuromuscular Kory:        mins  60648;    Therapeutic Activity:          mins  19669;     Gait Training:           mins  34996;     Ultrasound:          mins  84003;    Ionto                                   mins   58078  Self Care                            mins   73218    Un-Timed:  Electrical Stimulation:         mins  87576 ( );  Canalith Repos         mins 26047  Dry Needling          mins 68288/03543  Traction          mins 80540  Re-Eval                               mins  86240    No Charge:   Cryopack                         mins  Moist Heat                       mins    Timed  Treatment:   40   mins   Total Treatment:     40   mins        Ethel King PT  Physical Therapist  [place] License: [id number]

## 2023-11-30 ENCOUNTER — TREATMENT (OUTPATIENT)
Dept: PHYSICAL THERAPY | Facility: CLINIC | Age: 78
End: 2023-11-30
Payer: MEDICARE

## 2023-11-30 DIAGNOSIS — M25.552 LEFT HIP PAIN: ICD-10-CM

## 2023-11-30 DIAGNOSIS — M76.02 GLUTEAL TENDONITIS OF LEFT BUTTOCK: Primary | ICD-10-CM

## 2023-11-30 DIAGNOSIS — R26.2 DIFFICULTY WALKING: ICD-10-CM

## 2023-11-30 PROCEDURE — 97140 MANUAL THERAPY 1/> REGIONS: CPT | Performed by: PHYSICAL THERAPIST

## 2023-11-30 PROCEDURE — 97110 THERAPEUTIC EXERCISES: CPT | Performed by: PHYSICAL THERAPIST

## 2023-11-30 NOTE — PROGRESS NOTES
Physical Therapy Daily Treatment Note  [address] [place] [address]    Patient: Alfreda Gruber  : 1945  Referring practitioner: Mikey Henao I*  Date of Initial Visit: Type: THERAPY  Noted: 2023  Today's Date: 2023  Patient seen for 3 sessions      Visit Diagnoses:    ICD-10-CM ICD-9-CM   1. Gluteal tendonitis of left buttock  M76.02 726.5   2. Left hip pain  M25.552 719.45   3. Difficulty walking  R26.2 719.7       VISIT#: 3    Subjective   Alfreda Gruber reports that she feels like she is getting a lot better.  She still has moderate hip pain but it is improving with manual therapy and stretching.  Pain Rating (0-10): 3    Objective     See Exercise, Manual, and Modality Logs for complete treatment.     Patient Education: Discussed continued HEP and upcoming schedule with 's surgery    Assessment/Plan    Progress per Plan of Care and Progress strengthening /stabilization /functional activity.  Progress HEP over next few visits due to upcoming surgery for her  and her difficulty with continuing treatment.          Timed:         Manual Therapy:    23     mins  21702;     Therapeutic Exercise:    15     mins  54645;     Neuromuscular Kory:        mins  45895;    Therapeutic Activity:          mins  34676;     Gait Training:           mins  03217;     Ultrasound:          mins  83022;    Ionto                                   mins   73734  Self Care                            mins   75892    Un-Timed:  Electrical Stimulation:         mins  92122 ( );  Canalith Repos         mins 37618  Dry Needling          mins 18730/49592  Traction          mins 30886  Re-Eval                               mins  02275    No Charge:   Cryopack                         mins  Moist Heat                       mins    Timed Treatment:   38   mins   Total Treatment:     45   mins        Ethel King PT  Physical Therapist  [place] License:  [id number]

## 2023-12-06 ENCOUNTER — TREATMENT (OUTPATIENT)
Dept: PHYSICAL THERAPY | Facility: CLINIC | Age: 78
End: 2023-12-06
Payer: MEDICARE

## 2023-12-06 DIAGNOSIS — M25.552 LEFT HIP PAIN: ICD-10-CM

## 2023-12-06 DIAGNOSIS — R26.2 DIFFICULTY WALKING: ICD-10-CM

## 2023-12-06 DIAGNOSIS — M76.02 GLUTEAL TENDONITIS OF LEFT BUTTOCK: Primary | ICD-10-CM

## 2023-12-06 PROCEDURE — 97140 MANUAL THERAPY 1/> REGIONS: CPT | Performed by: PHYSICAL THERAPIST

## 2023-12-06 PROCEDURE — 97110 THERAPEUTIC EXERCISES: CPT | Performed by: PHYSICAL THERAPIST

## 2023-12-06 NOTE — PROGRESS NOTES
Physical Therapy Daily Treatment Note  [address] [place] [address]    Patient: Alfreda Gruber  : 1945  Referring practitioner: Mikey Henao I*  Date of Initial Visit: Type: THERAPY  Noted: 2023  Today's Date: 2023  Patient seen for 4 sessions      Visit Diagnoses:    ICD-10-CM ICD-9-CM   1. Gluteal tendonitis of left buttock  M76.02 726.5   2. Left hip pain  M25.552 719.45   3. Difficulty walking  R26.2 719.7       VISIT#: 4    Subjective   Alfreda Gruber reports that she is doing better.  She was able to walk through 2 stores yesterday and did well with minimal pain.  Pain Rating (0-10): unrated today    Objective     See Exercise, Manual, and Modality Logs for complete treatment.     Patient Education: Discussed continued treatment plan    Assessment & Plan       Assessment  Assessment details: Patient presents to physical therapy with a reduction in hip pain and reported improvement in walking tolerance.  She is doing well with her current HEP but still needs cueing for correct technique with hip stretching.      Progress per Plan of Care and Progress strengthening /stabilization /functional activity          Timed:         Manual Therapy:    23     mins  42451;     Therapeutic Exercise:    17     mins  57120;     Neuromuscular Kory:        mins  74743;    Therapeutic Activity:          mins  83016;     Gait Training:           mins  21517;     Ultrasound:          mins  38170;    Ionto                                   mins   68123  Self Care                            mins   04659    Un-Timed:  Electrical Stimulation:         mins  81298 ( );  Canalith Repos         mins 00057  Dry Needling          mins 67991/00798  Traction          mins 66384  Re-Eval                               mins  68498    No Charge:   Cryopack                         mins  Moist Heat                       mins    Timed Treatment:   40   mins   Total Treatment:      40   mins        Ethel King, PT  Physical Therapist  [place] License: [id number]

## 2023-12-08 ENCOUNTER — TREATMENT (OUTPATIENT)
Dept: PHYSICAL THERAPY | Facility: CLINIC | Age: 78
End: 2023-12-08
Payer: MEDICARE

## 2023-12-08 DIAGNOSIS — R26.2 DIFFICULTY WALKING: ICD-10-CM

## 2023-12-08 DIAGNOSIS — M25.552 LEFT HIP PAIN: ICD-10-CM

## 2023-12-08 DIAGNOSIS — M76.02 GLUTEAL TENDONITIS OF LEFT BUTTOCK: Primary | ICD-10-CM

## 2023-12-08 PROCEDURE — 97140 MANUAL THERAPY 1/> REGIONS: CPT | Performed by: PHYSICAL THERAPIST

## 2023-12-08 PROCEDURE — 97110 THERAPEUTIC EXERCISES: CPT | Performed by: PHYSICAL THERAPIST

## 2023-12-08 NOTE — PROGRESS NOTES
Physical Therapy Daily Treatment Note  [address] [place] [address]    Patient: Alfreda Gruber  : 1945  Referring practitioner: Mikey Henao I*  Date of Initial Visit: Type: THERAPY  Noted: 2023  Today's Date: 2023  Patient seen for 5 sessions      Visit Diagnoses:    ICD-10-CM ICD-9-CM   1. Gluteal tendonitis of left buttock  M76.02 726.5   2. Left hip pain  M25.552 719.45   3. Difficulty walking  R26.2 719.7       VISIT#: 5    Subjective   Alfreda Gruber reports that she is feeling pretty good. She denies any pain today but with walking, she still 'feels it' in her hip but no pain. Her  is having surgery next week so she may not be able to schedule much PT in the next couple of weeks.  Pain Rating (0-10): 0    Objective     See Exercise, Manual, and Modality Logs for complete treatment.     Patient Education: Discussed continued HEP and continued treatment plan    Assessment & Plan       Assessment  Assessment details: Patient reported with minimal hip pain/soreness.  He gait is improving and she is able to tolerate increased L hip ROM and strengthening exercises without pain.         Progress per Plan of Care and Progress strengthening /stabilization /functional activity          Timed:         Manual Therapy:    23     mins  26825;     Therapeutic Exercise:    17     mins  63971;     Neuromuscular Kory:        mins  93341;    Therapeutic Activity:          mins  38333;     Gait Training:           mins  92040;     Ultrasound:          mins  19042;    Ionto                                   mins   55078  Self Care                            mins   01579    Un-Timed:  Electrical Stimulation:         mins  38598 ( );  Canalith Repos         mins 10485  Dry Needling          mins 51055/35845  Traction          mins 57641  Re-Eval                               mins  60062    No Charge:   Cryopack                         mins  Moist Heat                        mins    Timed Treatment:   40   mins   Total Treatment:     40   mins        Ethel King PT  Physical Therapist  [place] License: [id number]

## 2023-12-11 ENCOUNTER — TREATMENT (OUTPATIENT)
Dept: PHYSICAL THERAPY | Facility: CLINIC | Age: 78
End: 2023-12-11
Payer: MEDICARE

## 2023-12-11 DIAGNOSIS — M76.02 GLUTEAL TENDONITIS OF LEFT BUTTOCK: Primary | ICD-10-CM

## 2023-12-11 DIAGNOSIS — M25.552 LEFT HIP PAIN: ICD-10-CM

## 2023-12-11 DIAGNOSIS — R26.2 DIFFICULTY WALKING: ICD-10-CM

## 2023-12-11 PROCEDURE — 97110 THERAPEUTIC EXERCISES: CPT | Performed by: PHYSICAL THERAPIST

## 2023-12-11 PROCEDURE — 97140 MANUAL THERAPY 1/> REGIONS: CPT | Performed by: PHYSICAL THERAPIST

## 2023-12-11 NOTE — PROGRESS NOTES
Physical Therapy Daily Treatment Note  [place]    [address] [place] [address]    Patient: Alfreda Gruber  : 1945  Referring practitioner: Mikey Henao I*  Date of Initial Visit: Type: THERAPY  Noted: 2023  Today's Date: 2023  Patient seen for 6 sessions      Visit Diagnoses:    ICD-10-CM ICD-9-CM   1. Gluteal tendonitis of left buttock  M76.02 726.5   2. Left hip pain  M25.552 719.45   3. Difficulty walking  R26.2 719.7       VISIT#: 6    Subjective   Alfreda Gruber reports that she is doing really well.  She states that she has been walking further without pain.  She states that her hip is still sore to touch but she has no pain.  Pain Rating (0-10): 0    Objective     See Exercise, Manual, and Modality Logs for complete treatment.     Patient Education: Discussed continued HEP and possible future follow-up in PT    Assessment & Plan       Assessment  Assessment details: Patient presents with significant reduction in overall pain.  She does have some mild tenderness and tightness to palpation in L piriformis and L glut med but this has improved significantly since beginning physical therapy.  She is independent with her current HEP and is doing well.    Plan  Treatment plan discussed with: patient  Plan details: Hold PT for now.  Patient's  is having surgery this week and will be hospitalized.  She will focus on her HEP and try to manage her symptoms on her own.  If she has a flare in her pain, she may call to schedule continued physical therapy.  If she does not return to physical therapy in the next month, her chart will be discharged at that time.                   Timed:         Manual Therapy:    23     mins  80293;     Therapeutic Exercise:    17     mins  42231;     Neuromuscular Kory:        mins  03835;    Therapeutic Activity:          mins  37999;     Gait Training:           mins  31711;     Ultrasound:          mins  28893;    Ionto                                    mins   38254  Self Care                            mins   57936    Un-Timed:  Electrical Stimulation:         mins  30454 ( );  Canalith Repos         mins 88370  Dry Needling          mins 46529/29845  Traction          mins 57344  Re-Eval                               mins  14116    No Charge:   Cryopack                         mins  Moist Heat                       mins    Timed Treatment:   40   mins   Total Treatment:     04   mins        Ethel King PT  Physical Therapist  [place] License: [id number]

## 2023-12-28 ENCOUNTER — TREATMENT (OUTPATIENT)
Dept: PHYSICAL THERAPY | Facility: CLINIC | Age: 78
End: 2023-12-28
Payer: MEDICARE

## 2023-12-28 DIAGNOSIS — R26.2 DIFFICULTY WALKING: ICD-10-CM

## 2023-12-28 DIAGNOSIS — M25.552 LEFT HIP PAIN: ICD-10-CM

## 2023-12-28 DIAGNOSIS — M76.02 GLUTEAL TENDONITIS OF LEFT BUTTOCK: Primary | ICD-10-CM

## 2023-12-28 PROCEDURE — 97140 MANUAL THERAPY 1/> REGIONS: CPT | Performed by: PHYSICAL THERAPIST

## 2023-12-28 PROCEDURE — 97110 THERAPEUTIC EXERCISES: CPT | Performed by: PHYSICAL THERAPIST

## 2023-12-28 NOTE — PROGRESS NOTES
Physical Therapy Daily Treatment Note  [address] [address]    Patient: Alfreda Gruber  : 1945  Referring practitioner: Mikey Henao I*  Date of Initial Visit: Type: THERAPY  Noted: 2023  Today's Date: 2023  Patient seen for 7 sessions      Visit Diagnoses:    ICD-10-CM ICD-9-CM   1. Gluteal tendonitis of left buttock  M76.02 726.5   2. Left hip pain  M25.552 719.45   3. Difficulty walking  R26.2 719.7       VISIT#: 7    Subjective   Alfreda Gruber reports to physical therapy for the first time in 2 weeks because her  had surgery and was hospitalized.  She states that her hip pain has continued but she was able to manage well considering the amount of walking and sitting in the hospital.  She has continued with her home stretches.  Pain Rating (0-10): 4    Objective          Static Posture   General Observations  Scoliosis.     Palpation   Left   Hypertonic in the piriformis.   Tenderness of the gluteus luis, gluteus medius, obturator externus, piriformis and TFL.     Tenderness     Left Hip   Tenderness in the greater trochanter.     Neurological Testing     Sensation     Hip   Left Hip   Intact: light touch    Passive Range of Motion   Left Hip   Flexion: 100 degrees with pain  Extension: 0 degrees   External rotation (90/90): 35 degrees   Internal rotation (90/90): 15 degrees     Strength/Myotome Testing     Left Hip   Planes of Motion   Flexion: 4  Extension: 3+  Abduction: 4-  Adduction: 4  External rotation: 3+  Internal rotation: 3+    Left Knee   Flexion: 4  Extension: 4    Left Ankle/Foot   Dorsiflexion: 4    Ambulation   Weight-Bearing Status   Assistive device used: none    Observational Gait   Decreased walking speed.     Functional Assessment     Single Leg Stance   Left: 0 seconds        See Exercise, Manual, and Modality Logs for complete treatment.     Patient Education: Continue current HEP    Assessment & Plan        Assessment  Impairments: abnormal coordination, abnormal gait, abnormal or restricted ROM, activity intolerance, impaired balance, impaired physical strength, lacks appropriate home exercise program, pain with function and weight-bearing intolerance   Functional limitations: walking, uncomfortable because of pain, sitting, standing, stooping and unable to perform repetitive tasks   Assessment details: The patient is a 78 y.o. female who presented to physical therapy for L gluteal tendonitis and hip pain. She has been seen for a total of 7 visits to date but has been unable to schedule for the past 2 weeks due to personal reasons.  Upon today's reassessment, the patient demonstrates the continued impairments: L hip pain, decreased ROM, decreased flexibility, difficulty with walking, decreased tolerance to prolonged sitting, and difficulty with position changes. Due to these impairments, the patient is still unable to walk long distances without support (>1 block) but has reported an improvement in sitting and standing tolerance overall. The patient could benefit from continued skilled PT services to address functional limitations and impairments and to improve patient quality of life.    Barriers to therapy: COPD, HTN, OP  Prognosis: good    Goals  Plan Goals: STG's: 3 weeks   Patient will report pain level at worse </= 7/10 for improved tolerance to ADLs and functional mobility- MET  Patient will require <3 tactile or verbal cues for proper mechanics during completion of her HEP  - MET    LTG's: By Discharge  Patient will report pain levels at worst </= 5/10 for improved tolerance to ADLs and functional mobility- PARTIALLY MET  Patient will be able to ambulate unlimited distances without an assistive device and no increased pain- PARTIALLY MET  Patient will be able to complete single leg stance on stable surface with no UE support, with supervision for durations > 5 seconds on LLE to decrease risk of falls-  PARTIALLY MET  Patient will report >75% improvement in her ability to tolerate sitting for durations > 60 minutes per reduction in her symptoms - PARTIALLY MET  Patient will report >75% improvement in her ability to tolerate standing for durations >20 minutes to improve tolerance to meal prep and household chores- PARTIALLY MET  Patient will report improved levels of overall function as demonstrated by an increase in her reported level of function score on the LEFS to >/= 36/80    Patient will report improvement in their tolerance to sleeping and report waking up </= 1x/night per positional discomfort- PARTIALLY MET  Patient will require no tactile or verbal cues for proper mechaics during completion of her HEP for final independence - PARTIALLY MET      Plan  Therapy options: will be seen for skilled therapy services  Planned modality interventions: cryotherapy, electrical stimulation/Russian stimulation, TENS and thermotherapy (hydrocollator packs)  Planned therapy interventions: abdominal trunk stabilization, manual therapy, balance/weight-bearing training, neuromuscular re-education, postural training, soft tissue mobilization, flexibility, functional ROM exercises, gait training, home exercise program, strengthening, stretching, joint mobilization and therapeutic activities  Frequency: 2x week  Duration in weeks: 8  Treatment plan discussed with: patient      Progress per Plan of Care and Progress strengthening /stabilization /functional activity          Timed:         Manual Therapy:    23     mins  81541;     Therapeutic Exercise:    15     mins  99509;     Neuromuscular Kory:        mins  87822;    Therapeutic Activity:          mins  91967;     Gait Training:           mins  50219;     Ultrasound:          mins  33435;    Ionto                                   mins   69551  Self Care                            mins   79415    Un-Timed:  Electrical Stimulation:         mins  63545 ( );  Radha  Repos         mins 82380  Dry Needling          mins 71320/01740  Traction          mins 10649  Re-Eval                               mins  32885    No Charge:   Cryopack                         mins  Moist Heat                       mins    Timed Treatment:   38   mins   Total Treatment:     45   mins        Ethel King PT  Physical Therapist  [place] License: [id number]

## 2024-01-04 ENCOUNTER — TREATMENT (OUTPATIENT)
Dept: PHYSICAL THERAPY | Facility: CLINIC | Age: 79
End: 2024-01-04
Payer: MEDICARE

## 2024-01-04 DIAGNOSIS — M25.552 LEFT HIP PAIN: ICD-10-CM

## 2024-01-04 DIAGNOSIS — R26.2 DIFFICULTY WALKING: ICD-10-CM

## 2024-01-04 DIAGNOSIS — M76.02 GLUTEAL TENDONITIS OF LEFT BUTTOCK: Primary | ICD-10-CM

## 2024-01-04 PROCEDURE — 97110 THERAPEUTIC EXERCISES: CPT | Performed by: PHYSICAL THERAPIST

## 2024-01-04 PROCEDURE — 97140 MANUAL THERAPY 1/> REGIONS: CPT | Performed by: PHYSICAL THERAPIST

## 2024-01-04 NOTE — PROGRESS NOTES
Physical Therapy Daily Treatment Note  [address] [place] [address]    Patient: Alfreda Gruber  : 1945  Referring practitioner: Mikey Henao I*  Date of Initial Visit: Type: THERAPY  Noted: 2023  Today's Date: 2024  Patient seen for 8 sessions      Visit Diagnoses:    ICD-10-CM ICD-9-CM   1. Gluteal tendonitis of left buttock  M76.02 726.5   2. Left hip pain  M25.552 719.45   3. Difficulty walking  R26.2 719.7       VISIT#: 8    Subjective   Alfreda Gruber reports that she still has some pain with prolonged walking but her tolerance to sitting is improving.  She is pleased with her progress and feels encouraged.  Pain Rating (0-10): 3    Objective     See Exercise, Manual, and Modality Logs for complete treatment.     Patient Education: Continue current HEP    Assessment & Plan       Assessment  Assessment details: Patient continues with mild L hip pain.  She is responding well to manual therapy with focus on soft tissue mobilization followed by progressive stretches for ROM and flexibility.  Patient is reporting increased sitting tolerance.       Progress per Plan of Care and Progress strengthening /stabilization /functional activity          Timed:         Manual Therapy:    23     mins  93169;     Therapeutic Exercise:    17     mins  18097;     Neuromuscular Kory:        mins  42857;    Therapeutic Activity:          mins  43278;     Gait Training:           mins  81681;     Ultrasound:          mins  11314;    Ionto                                   mins   67373  Self Care                            mins   19301    Un-Timed:  Electrical Stimulation:         mins  19500 ( );  Canalith Repos         mins 76194  Dry Needling          mins 75509/48936  Traction          mins 01863  Re-Eval                               mins  86583    No Charge:   Cryopack                         mins  Moist Heat                       mins    Timed Treatment:    40   mins   Total Treatment:     40   mins        Ethel King, PT  Physical Therapist  [place] License: [id number]

## 2024-01-08 ENCOUNTER — TREATMENT (OUTPATIENT)
Dept: PHYSICAL THERAPY | Facility: CLINIC | Age: 79
End: 2024-01-08
Payer: MEDICARE

## 2024-01-08 DIAGNOSIS — R26.2 DIFFICULTY WALKING: ICD-10-CM

## 2024-01-08 DIAGNOSIS — M76.02 GLUTEAL TENDONITIS OF LEFT BUTTOCK: Primary | ICD-10-CM

## 2024-01-08 DIAGNOSIS — M25.552 LEFT HIP PAIN: ICD-10-CM

## 2024-01-08 PROCEDURE — 97140 MANUAL THERAPY 1/> REGIONS: CPT | Performed by: PHYSICAL THERAPIST

## 2024-01-08 PROCEDURE — 97110 THERAPEUTIC EXERCISES: CPT | Performed by: PHYSICAL THERAPIST

## 2024-01-08 NOTE — PROGRESS NOTES
Physical Therapy Daily Treatment Note  [address] [place] [address]    Patient: Alfreda Gruber  : 1945  Referring practitioner: Mikey Henao I*  Date of Initial Visit: Type: THERAPY  Noted: 2023  Today's Date: 2024  Patient seen for 9 sessions      Visit Diagnoses:    ICD-10-CM ICD-9-CM   1. Gluteal tendonitis of left buttock  M76.02 726.5   2. Left hip pain  M25.552 719.45   3. Difficulty walking  R26.2 719.7       VISIT#: 9    Subjective   Alfreda Gruber reports that she is feeling much better and she hasn't had much trouble out of her hip this past weekend.   Pain Rating (0-10): 0    Objective     See Exercise, Manual, and Modality Logs for complete treatment.     Patient Education: Continue current HEP with focus on stretching    Assessment & Plan       Assessment  Assessment details: Patient presents with mild tightness in L piriformis and throughout gluteals.  She is responding really well to physical therapy with focus on hip flexibility and stretching.  She is reporting increased walking tolerance at home.         Progress per Plan of Care and Progress strengthening /stabilization /functional activity          Timed:         Manual Therapy:    23     mins  54843;     Therapeutic Exercise:    15     mins  87132;     Neuromuscular Kory:        mins  55147;    Therapeutic Activity:          mins  23158;     Gait Training:           mins  50073;     Ultrasound:          mins  52338;    Ionto                                   mins   68654  Self Care                            mins   13913    Un-Timed:  Electrical Stimulation:         mins  48478 ( );  Canalith Repos         mins 15804  Dry Needling          mins 78468/51925  Traction          mins 26900  Re-Eval                               mins  47024    No Charge:   Cryopack                         mins  Moist Heat                       mins    Timed Treatment:   38   mins   Total  Treatment:     38   mins        Ethel King, PT  Physical Therapist  [place] License: [id number]

## 2024-01-15 ENCOUNTER — TELEPHONE (OUTPATIENT)
Dept: PHYSICAL THERAPY | Facility: CLINIC | Age: 79
End: 2024-01-15
Payer: MEDICARE

## 2024-01-15 NOTE — TELEPHONE ENCOUNTER
Caller: Alfreda Gruber    Relationship: Self         What was the call regarding: IS FEELING MUCH BETTER, NOT GOING TO SCHEDULE ANYMORE UNLESS THAIS WANTS TO SEE HER

## 2024-01-23 ENCOUNTER — TELEPHONE (OUTPATIENT)
Dept: PHYSICAL THERAPY | Facility: CLINIC | Age: 79
End: 2024-01-23
Payer: MEDICARE

## 2024-01-23 NOTE — TELEPHONE ENCOUNTER
PATIENT'S LEG IS DOING WELL - DOES SHE NEED TO COME BACK TO PHYSICAL THERAPY OR DROP IT FOR NOW?  PLEASE CALL HER [phone]

## 2024-01-24 NOTE — TELEPHONE ENCOUNTER
ATTEMPTED TO RETURN PATIENT'S CALL.  LEFT MESSAGE ON VOICEMAIL IF PATIENT WOULD LIKE TO CALL THERAPIST BACK.

## 2024-02-07 ENCOUNTER — OFFICE VISIT (OUTPATIENT)
Dept: CARDIOLOGY | Facility: CLINIC | Age: 79
End: 2024-02-07
Payer: MEDICARE

## 2024-02-07 VITALS
DIASTOLIC BLOOD PRESSURE: 69 MMHG | WEIGHT: 150.8 LBS | OXYGEN SATURATION: 93 % | BODY MASS INDEX: 25.74 KG/M2 | SYSTOLIC BLOOD PRESSURE: 131 MMHG | HEART RATE: 75 BPM | HEIGHT: 64 IN

## 2024-02-07 DIAGNOSIS — E78.5 DYSLIPIDEMIA: ICD-10-CM

## 2024-02-07 DIAGNOSIS — I10 PRIMARY HYPERTENSION: ICD-10-CM

## 2024-02-07 DIAGNOSIS — I25.10 CHRONIC CORONARY ARTERY DISEASE: Primary | ICD-10-CM

## 2024-02-07 PROCEDURE — 1160F RVW MEDS BY RX/DR IN RCRD: CPT | Performed by: INTERNAL MEDICINE

## 2024-02-07 PROCEDURE — 3075F SYST BP GE 130 - 139MM HG: CPT | Performed by: INTERNAL MEDICINE

## 2024-02-07 PROCEDURE — 3078F DIAST BP <80 MM HG: CPT | Performed by: INTERNAL MEDICINE

## 2024-02-07 PROCEDURE — 1159F MED LIST DOCD IN RCRD: CPT | Performed by: INTERNAL MEDICINE

## 2024-02-07 PROCEDURE — 99212 OFFICE O/P EST SF 10 MIN: CPT | Performed by: INTERNAL MEDICINE

## 2024-02-07 NOTE — PROGRESS NOTES
Progress note      Name: Alfreda Gruber ADMIT: (Not on file)   : 1945  PCP: Chika Islas MD    MRN: [id number] LOS: 0 days   AGE/SEX: 78 y.o. female  ROOM: Room/bed info not found     Chief Complaint   Patient presents with    Follow-up     8 month f/u       Subjective       History of present illness  Alfreda Gruber is an 78-year-old female patient who has history of coronary artery disease status post PCI due to MI in , has COPD, ongoing smoking, here today for follow-up.  She is doing well denies having any chest pain or shortness of breath, no palpitations no lower extremity edema no syncopal episodes.     Past Medical History:   Diagnosis Date    Acute coronary syndrome     Arthritis     CAD (coronary artery disease) 2015    Cardiomyopathy     Cataract     COPD (chronic obstructive pulmonary disease)     COVID 2022    Dyspnea     GERD (gastroesophageal reflux disease)     Glaucoma     Hx of non-ST elevation myocardial infarction (NSTEMI)     Osteoporosis      Past Surgical History:   Procedure Laterality Date    CARDIAC SURGERY  2015    Heart Cath    CORONARY ANGIOPLASTY WITH STENT PLACEMENT  2015    STACEY: proximal RCA    TUMOR REMOVAL      from stomach     Family History   Problem Relation Age of Onset    Breast cancer Mother     Lung cancer Father     Heart disease Sister     Diabetes Sister     Stroke Sister     Heart attack Sister     Diabetes Paternal Grandfather      Social History     Tobacco Use    Smoking status: Every Day     Packs/day: .5     Types: Cigarettes     Start date:      Passive exposure: Past    Smokeless tobacco: Never   Vaping Use    Vaping Use: Never used   Substance Use Topics    Alcohol use: Never    Drug use: Never       Current Outpatient Medications:     albuterol sulfate  (90 Base) MCG/ACT inhaler, Inhale 2 puffs Every 4 (Four) Hours As Needed for Wheezing or Shortness of Air., Disp: 6.7 g, Rfl: 2     aspirin 81 MG EC tablet, Take 1 tablet by mouth Daily., Disp: , Rfl:     atorvastatin (LIPITOR) 40 MG tablet, Take 1 tablet by mouth Daily., Disp: 90 tablet, Rfl: 3    fluticasone (FLONASE) 50 MCG/ACT nasal spray, 2 sprays into the nostril(s) as directed by provider Daily As Needed for Rhinitis., Disp: , Rfl:     latanoprost (XALATAN) 0.005 % ophthalmic solution, Administer 1 drop to both eyes Every Night., Disp: , Rfl:     metoprolol tartrate (LOPRESSOR) 25 MG tablet, Take 0.5 tablets by mouth 2 (Two) Times a Day., Disp: 180 tablet, Rfl: 2  Allergies:  Penicillins      Physical Exam  VITALS REVIEWED    General:      well developed, in no acute distress.    Head:      normocephalic and atraumatic.    Eyes:      PERRL/EOM intact, conjunctiva and sclera clear with out nystagmus.    Neck:      no masses, thyromegaly,  trachea central with normal respiratory effort and PMI displaced laterally  Lungs:      Clear to auscultation bilaterally  Heart:       Regular rate and rhythm  Msk:      no deformity or scoliosis noted of thoracic or lumbar spine.    Pulses:      pulses normal in all 4 extremities.    Extremities:       No lower extremity edema  Neurologic:      no focal deficits.   alert oriented x3  Skin:      intact without lesions or rashes.    Psych:      alert and cooperative; normal mood and affect; normal attention span and concentration.      Result Review :               Pertinent cardiac workup    EKG 4/15/2023 sinus rhythm 57 bpm       Procedures        Assessment and Plan      Alfreda Gruber is an 78-year-old female patient who has history of coronary artery disease as detailed above, patient denies any anginal symptoms or CHF symptoms,   Previously she has had issues with bradycardia with her metoprolol was decreased and her heart rate recovered.  Her blood pressure is also well-controlled.  Will continue same therapy, will see her in follow-up in 8 months.    Diagnoses and all orders for this  visit:    1. Chronic coronary artery disease (Primary)    2. Primary hypertension    3. Dyslipidemia           Return in about 8 months (around 10/7/2024).  Patient was given instructions and counseling regarding her condition or for health maintenance advice. Please see specific information pulled into the AVS if appropriate.

## 2024-02-14 ENCOUNTER — DOCUMENTATION (OUTPATIENT)
Dept: PHYSICAL THERAPY | Facility: CLINIC | Age: 79
End: 2024-02-14
Payer: MEDICARE

## 2024-04-16 NOTE — TELEPHONE ENCOUNTER
Rx Refill Note  Requested Prescriptions     Pending Prescriptions Disp Refills    metoprolol tartrate (LOPRESSOR) 25 MG tablet [Pharmacy Med Name: METOPROLOL TARTRATE 25MG TABLETS] 180 tablet 2     Sig: TAKE 1 TABLET BY MOUTH TWICE DAILY      Last office visit with prescribing clinician: 2/7/2024   Last telemedicine visit with prescribing clinician: Visit date not found   Next office visit with prescribing clinician: 11/12/2024                         Would you like a call back once the refill request has been completed: [] Yes [] No    If the office needs to give you a call back, can they leave a voicemail: [] Yes [] No    Deedee Love MA  04/16/24, 12:51 EDT

## 2024-05-13 RX ORDER — ATORVASTATIN CALCIUM 40 MG/1
40 TABLET, FILM COATED ORAL DAILY
Qty: 90 TABLET | Refills: 3 | Status: SHIPPED | OUTPATIENT
Start: 2024-05-13

## 2024-05-13 NOTE — TELEPHONE ENCOUNTER
Rx Refill Note  Requested Prescriptions     Pending Prescriptions Disp Refills    atorvastatin (LIPITOR) 40 MG tablet [Pharmacy Med Name: ATORVASTATIN 40MG TABLETS] 90 tablet 3     Sig: TAKE 1 TABLET BY MOUTH DAILY      Last office visit with prescribing clinician: 2/7/2024   Last telemedicine visit with prescribing clinician: Visit date not found   Next office visit with prescribing clinician: 11/12/2024                         Would you like a call back once the refill request has been completed: [] Yes [] No    If the office needs to give you a call back, can they leave a voicemail: [] Yes [] No    Deedee Love MA  05/13/24, 15:01 EDT

## 2024-06-20 ENCOUNTER — OFFICE VISIT (OUTPATIENT)
Dept: FAMILY MEDICINE CLINIC | Facility: CLINIC | Age: 79
End: 2024-06-20
Payer: MEDICARE

## 2024-06-20 VITALS
TEMPERATURE: 97.1 F | OXYGEN SATURATION: 96 % | SYSTOLIC BLOOD PRESSURE: 119 MMHG | HEIGHT: 64 IN | WEIGHT: 148.8 LBS | BODY MASS INDEX: 25.4 KG/M2 | DIASTOLIC BLOOD PRESSURE: 74 MMHG | HEART RATE: 71 BPM

## 2024-06-20 DIAGNOSIS — R42 DIZZINESS: Primary | ICD-10-CM

## 2024-06-20 DIAGNOSIS — M25.552 LEFT HIP PAIN: ICD-10-CM

## 2024-06-20 DIAGNOSIS — J44.9 CHRONIC OBSTRUCTIVE PULMONARY DISEASE, UNSPECIFIED COPD TYPE: ICD-10-CM

## 2024-06-20 DIAGNOSIS — M62.838 TRAPEZIUS MUSCLE SPASM: ICD-10-CM

## 2024-06-20 PROCEDURE — 3078F DIAST BP <80 MM HG: CPT | Performed by: FAMILY MEDICINE

## 2024-06-20 PROCEDURE — 99214 OFFICE O/P EST MOD 30 MIN: CPT | Performed by: FAMILY MEDICINE

## 2024-06-20 PROCEDURE — 1125F AMNT PAIN NOTED PAIN PRSNT: CPT | Performed by: FAMILY MEDICINE

## 2024-06-20 PROCEDURE — 3074F SYST BP LT 130 MM HG: CPT | Performed by: FAMILY MEDICINE

## 2024-06-20 RX ORDER — ALBUTEROL SULFATE 90 UG/1
2 AEROSOL, METERED RESPIRATORY (INHALATION) EVERY 4 HOURS PRN
Qty: 6.7 G | Refills: 2 | Status: SHIPPED | OUTPATIENT
Start: 2024-06-20

## 2024-06-20 NOTE — PROGRESS NOTES
Subjective   Alfreda Gruber is a 78 y.o. female.     History of Present Illness  The patient is an 78-year-old female who presents for evaluation of multiple medical concerns.    The patient was evaluated by an orthopedic specialist for her hip, who deemed it unnecessary for surgical intervention and referred her to Dr. Henao. She received an injection and subsequently underwent physical therapy, with the recommendation for bi-monthly sessions. However, her condition has progressively worsened, causing her concern about her ability to ambulate on her leg. An x-ray of her left hip was conducted in Oct. She expresses concern that her condition will progressively worsen, leading her to contemplate further testing to identify any other potential causes. Her mobility is significantly compromised, limiting her to walk a block before the pain intensifies.    During her last visit in 10/2023, she reported neck pain, which she describes as a curvature in her neck.    The patient has been experiencing severe dizziness, with 5 episodes occurring every 2 to 3 months. These episodes, which necessitated a 24-hour hospitalization last year, were attributed to an inner ear issue. The dizziness, which last throughout the day, induces nausea and imbalance, necessitating the support of objects. She denies any tinnitus.    Supplemental Information  She had a stent placed in her heart 5 years ago.       The following portions of the patient's history were reviewed and updated as appropriate: allergies, current medications, past family history, past medical history, past social history, past surgical history, and problem list.  Past Medical History:   Diagnosis Date    Acute coronary syndrome 2015    Arthritis     CAD (coronary artery disease) 02/27/2015    Cardiomyopathy     Cataract     COPD (chronic obstructive pulmonary disease)     COVID 07/2022    Dyspnea     GERD (gastroesophageal reflux disease)     Glaucoma     Hx of  "non-ST elevation myocardial infarction (NSTEMI) 2015    Osteoporosis      Past Surgical History:   Procedure Laterality Date    CARDIAC SURGERY  02/27/2015    Heart Cath    CORONARY ANGIOPLASTY WITH STENT PLACEMENT  02/27/2015    STACEY: proximal RCA    TUMOR REMOVAL      from stomach     Family History   Problem Relation Age of Onset    Breast cancer Mother     Lung cancer Father     Heart disease Sister     Diabetes Sister     Stroke Sister     Heart attack Sister     Diabetes Paternal Grandfather      Social History     Socioeconomic History    Marital status:    Tobacco Use    Smoking status: Every Day     Current packs/day: 0.50     Average packs/day: 0.5 packs/day for 51.5 years (25.7 ttl pk-yrs)     Types: Cigarettes     Start date: 1973     Passive exposure: Past    Smokeless tobacco: Never   Vaping Use    Vaping status: Never Used   Substance and Sexual Activity    Alcohol use: Never    Drug use: Never    Sexual activity: Defer         Current Outpatient Medications:     albuterol sulfate  (90 Base) MCG/ACT inhaler, Inhale 2 puffs Every 4 (Four) Hours As Needed for Wheezing or Shortness of Air., Disp: 6.7 g, Rfl: 2    aspirin 81 MG EC tablet, Take 1 tablet by mouth Daily., Disp: , Rfl:     atorvastatin (LIPITOR) 40 MG tablet, TAKE 1 TABLET BY MOUTH DAILY, Disp: 90 tablet, Rfl: 3    fluticasone (FLONASE) 50 MCG/ACT nasal spray, 2 sprays into the nostril(s) as directed by provider Daily As Needed for Rhinitis., Disp: , Rfl:     latanoprost (XALATAN) 0.005 % ophthalmic solution, Administer 1 drop to both eyes Every Night., Disp: , Rfl:     metoprolol tartrate (LOPRESSOR) 25 MG tablet, TAKE 1 TABLET BY MOUTH TWICE DAILY, Disp: 180 tablet, Rfl: 2    Review of Systems  ROS done and noted in HPI    /74 (BP Location: Left arm, Patient Position: Sitting, Cuff Size: Adult)   Pulse 71   Temp 97.1 °F (36.2 °C) (Temporal)   Ht 162.6 cm (64\")   Wt 67.5 kg (148 lb 12.8 oz)   LMP  (LMP Unknown)   " SpO2 96%   BMI 25.54 kg/m²           Objective   Physical Exam  Vitals and nursing note reviewed.   Constitutional:       Appearance: Normal appearance.   HENT:      Right Ear: Tympanic membrane, ear canal and external ear normal.      Left Ear: Tympanic membrane, ear canal and external ear normal.   Neck:      Comments: Trapezius spasm bilaterally  Cardiovascular:      Rate and Rhythm: Normal rate and regular rhythm.      Heart sounds: Normal heart sounds.   Pulmonary:      Effort: Pulmonary effort is normal.      Breath sounds: Normal breath sounds.   Musculoskeletal:      Cervical back: Neck supple.      Right lower leg: No edema.      Left lower leg: No edema.      Comments: No cervical vertebral tenderness   Lymphadenopathy:      Cervical: No cervical adenopathy.   Neurological:      Mental Status: She is alert.   Psychiatric:         Mood and Affect: Mood is anxious.       Physical Exam         Results       XR HIP W OR WO PELVIS 2-3 VIEW LEFT     Date of Exam: 10/12/2023 2:04 PM EDT     Indication: left hip pain     Comparison: None available.     Findings:  3 views. There is moderate narrowing of the superior lateral aspects of the bilateral hip joints. The femoral heads have normal rounded contours. The bony pelvic ring is intact. There is mild narrowing of the SI joints with slight sclerosis.     IMPRESSION:  Impression:  Degenerative changes as detailed.        Electronically Signed: Kerry Wright MD    10/12/2023 3:55 PM EDT    Workstation ID: [id number]  Assessment & Plan   Problems Addressed this Visit          Pulmonary and Pneumonias    Chronic obstructive pulmonary disease    Relevant Medications    albuterol sulfate  (90 Base) MCG/ACT inhaler       Symptoms and Signs    Dizziness - Primary    Relevant Orders    Ambulatory Referral to ENT (Otolaryngology)     Other Visit Diagnoses       Left hip pain        Relevant Orders    Ambulatory Referral to Orthopedic Surgery (Completed)    Trapezius  muscle spasm              Diagnoses         Codes Comments    Dizziness    -  Primary ICD-10-CM: R42  ICD-9-CM: 780.4     Chronic obstructive pulmonary disease, unspecified COPD type     ICD-10-CM: J44.9  ICD-9-CM: 496 out of inhaler  will send in Breo  some wheezing in left lobes  see if inhaler improves.  call if no improvement  smoking cessation  flonase    Left hip pain     ICD-10-CM: M25.552  ICD-9-CM: 719.45     Trapezius muscle spasm     ICD-10-CM: M62.838  ICD-9-CM: 728.85           Assessment & Plan  1. Pain in the left hip.  With the patient's concerns and worsening symptoms, I will go ahead and get her a referral to a different orthopedic group for fresh evaluation    2.  Trapezius spasm  The patient is advised to apply heat to the affected area, including hot showers, a heating pad, and warm wet towels.    3.  Dizziness.  A referral to an otolaryngologist will be made.    4.  COPD: Refill of her albuterol            Patient or patient representative verbalized consent for the use of Ambient Listening during the visit with  Chika Islas MD for chart documentation. 6/20/2024  13:22 EDT

## 2024-07-26 ENCOUNTER — OFFICE VISIT (OUTPATIENT)
Dept: FAMILY MEDICINE CLINIC | Facility: CLINIC | Age: 79
End: 2024-07-26
Payer: MEDICARE

## 2024-07-26 VITALS
SYSTOLIC BLOOD PRESSURE: 128 MMHG | DIASTOLIC BLOOD PRESSURE: 76 MMHG | OXYGEN SATURATION: 96 % | TEMPERATURE: 98.2 F | HEIGHT: 64 IN | BODY MASS INDEX: 25.06 KG/M2 | HEART RATE: 65 BPM | WEIGHT: 146.8 LBS

## 2024-07-26 DIAGNOSIS — S92.351D CLOSED DISPLACED FRACTURE OF FIFTH METATARSAL BONE OF RIGHT FOOT WITH ROUTINE HEALING, SUBSEQUENT ENCOUNTER: Primary | ICD-10-CM

## 2024-07-26 DIAGNOSIS — M54.2 NECK PAIN: ICD-10-CM

## 2024-07-26 PROCEDURE — 3074F SYST BP LT 130 MM HG: CPT | Performed by: NURSE PRACTITIONER

## 2024-07-26 PROCEDURE — 1125F AMNT PAIN NOTED PAIN PRSNT: CPT | Performed by: NURSE PRACTITIONER

## 2024-07-26 PROCEDURE — 99213 OFFICE O/P EST LOW 20 MIN: CPT | Performed by: NURSE PRACTITIONER

## 2024-07-26 PROCEDURE — 3078F DIAST BP <80 MM HG: CPT | Performed by: NURSE PRACTITIONER

## 2024-07-26 NOTE — PROGRESS NOTES
Chief Complaint  Hospital Follow Up Visit (From  ), Arthritis (Poss in her neck ), and referral for foot dr    Subjective        Alfreda Gruber presents to [facility]  History of Present Illness  Alfreda is an 78-year-old female presenting today for an urgent care follow-up.  She was seen at [facility] urgent care on 7/19/2024 for a right foot fracture. The pain and swelling has improved. She still has significant bruising. She is currently wearing a CAM boot and using a walker for ambulation. She needs a referral to podiatry. She is also reporting neck pain with dizziness. She was referred to ENT who did a MRI which showed arthritis. She would like to see PT.       The following portions of the patient's history were reviewed and updated as appropriate: allergies, current medications, past family history, past medical history, past social history, past surgical history and problem list.    Allergies   Allergen Reactions    Penicillins Rash       Patient Active Problem List   Diagnosis    Cardiomyopathy    Chronic coronary artery disease    Chronic obstructive pulmonary disease    Encounter for annual wellness exam in Medicare patient    Gastroesophageal reflux disease    Hearing loss    Hypertension    Osteoporosis    Other screening mammogram    Status post percutaneous transluminal coronary angioplasty    Tobacco use    Varicose veins of lower extremity    Dyslipidemia    Total, mature senile cataract    Primary open angle glaucoma    Presbyopia    Nuclear senile cataract    Posterior capsule opacification    Dizziness    Chronic radicular lumbar pain       Current Outpatient Medications   Medication Instructions    albuterol sulfate  (90 Base) MCG/ACT inhaler 2 puffs, Inhalation, Every 4 Hours PRN    Albuterol-Budesonide 90-80 MCG/ACT aerosol Inhale by inhalation route.    aspirin 81 mg, Oral, Daily    atorvastatin (LIPITOR) 40 mg, Oral, Daily    fluticasone  "(FLONASE) 50 MCG/ACT nasal spray 2 sprays, Nasal, Daily PRN    latanoprost (XALATAN) 0.005 % ophthalmic solution Administer 1 drop to both eyes Every Night.    metoprolol tartrate (LOPRESSOR) 25 mg, Oral, 2 Times Daily          Objective   Vital Signs:  /76 (BP Location: Left arm, Patient Position: Sitting, Cuff Size: Adult)   Pulse 65   Temp 98.2 °F (36.8 °C) (Temporal)   Ht 162.6 cm (64\")   Wt 66.6 kg (146 lb 12.8 oz)   SpO2 96%   BMI 25.20 kg/m²   Estimated body mass index is 25.2 kg/m² as calculated from the following:    Height as of this encounter: 162.6 cm (64\").    Weight as of this encounter: 66.6 kg (146 lb 12.8 oz).               Review of Systems   Constitutional:  Negative for activity change, chills, fatigue, fever and unexpected weight change.   Cardiovascular:  Negative for leg swelling.   Musculoskeletal:  Positive for gait problem and neck pain. Negative for arthralgias, back pain, joint swelling, myalgias and neck stiffness.   Neurological:  Negative for weakness.        Physical Exam  Constitutional:       Appearance: Normal appearance.   Cardiovascular:      Rate and Rhythm: Normal rate and regular rhythm.   Pulmonary:      Effort: Pulmonary effort is normal.      Breath sounds: Normal breath sounds.   Musculoskeletal:         General: Normal range of motion.      Cervical back: Normal range of motion and neck supple. Pain with movement present.      Right foot: Decreased range of motion.   Feet:      Comments: Right foot in CAM boot. No swelling. Bruising present  Skin:     General: Skin is warm and dry.      Capillary Refill: Capillary refill takes less than 2 seconds.   Neurological:      Mental Status: She is alert and oriented to person, place, and time.   Psychiatric:         Mood and Affect: Mood normal.         Behavior: Behavior normal.         Thought Content: Thought content normal.         Judgment: Judgment normal.        Result Review :                   Assessment and " Plan   Diagnoses and all orders for this visit:    1. Closed displaced fracture of fifth metatarsal bone of right foot with routine healing, subsequent encounter (Primary)  Comments:  cont wearing CAM boot and using walker  referral to podiatry  Orders:  -     Ambulatory Referral to Podiatry    2. Neck pain  Comments:  referral to PT  Orders:  -     Ambulatory Referral to Physical Therapy             Follow Up   No follow-ups on file.  Patient was given instructions and counseling regarding her condition or for health maintenance advice. Please see specific information pulled into the AVS if appropriate.

## 2024-07-30 ENCOUNTER — TELEPHONE (OUTPATIENT)
Dept: FAMILY MEDICINE CLINIC | Facility: CLINIC | Age: 79
End: 2024-07-30
Payer: MEDICARE

## 2024-07-30 NOTE — TELEPHONE ENCOUNTER
Alfreda called and is requesting a new podiatry referral due to wait time , saids she wants to be seen sooner

## 2024-08-02 ENCOUNTER — TELEPHONE (OUTPATIENT)
Dept: FAMILY MEDICINE CLINIC | Facility: CLINIC | Age: 79
End: 2024-08-02

## 2024-08-02 NOTE — TELEPHONE ENCOUNTER
Caller: Alfreda Gruber    Relationship: Self    Best call back number: [phone]    What is the medical concern/diagnosis: FRACTURE    What specialty or service is being requested: ORTHOPEDIC    What is the provider, practice or medical service name: [facility]    What is the office location: [address]    Any additional details: DR MORE    IS SCHEDULED 8/6/24    OTHER FACILITY SCHEDULED WITH IS SCHEDULED OUT INTO 8/30

## 2024-08-05 ENCOUNTER — TELEPHONE (OUTPATIENT)
Dept: FAMILY MEDICINE CLINIC | Facility: CLINIC | Age: 79
End: 2024-08-05
Payer: MEDICARE

## 2024-08-05 ENCOUNTER — TREATMENT (OUTPATIENT)
Dept: PHYSICAL THERAPY | Facility: CLINIC | Age: 79
End: 2024-08-05
Payer: MEDICARE

## 2024-08-05 DIAGNOSIS — R42 VERTIGO: ICD-10-CM

## 2024-08-05 DIAGNOSIS — M54.2 PAIN, NECK: Primary | ICD-10-CM

## 2024-08-05 DIAGNOSIS — R42 DIZZINESS: ICD-10-CM

## 2024-08-05 PROCEDURE — 97162 PT EVAL MOD COMPLEX 30 MIN: CPT | Performed by: PHYSICAL THERAPIST

## 2024-08-05 PROCEDURE — 95992 CANALITH REPOSITIONING PROC: CPT | Performed by: PHYSICAL THERAPIST

## 2024-08-05 PROCEDURE — 97530 THERAPEUTIC ACTIVITIES: CPT | Performed by: PHYSICAL THERAPIST

## 2024-08-05 PROCEDURE — 97110 THERAPEUTIC EXERCISES: CPT | Performed by: PHYSICAL THERAPIST

## 2024-08-05 NOTE — TELEPHONE ENCOUNTER
Alfreda is needing a new Referral put in for orthopedics , she said's [facility] can not get her in until August 30th but she called [facility] on [address] and they can get her in next Tuesday

## 2024-08-05 NOTE — PROGRESS NOTES
Physical Therapy Initial Evaluation and Plan of Care  [address] [place] [address]    Patient: Alfreda Gruber   : 1945  Diagnosis/ICD-10 Code:  Pain, neck [M54.2]  Referring practitioner: GRADY Pacheco  Date of Initial Visit: 2024  Today's Date: 2024  Patient seen for 1 sessions           Visit Diagnoses:     ICD-10-CM ICD-9-CM   1. Pain, neck  M54.2 723.1   2. Dizziness  R42 780.4   3. Vertigo  R42 780.4        Subjective Questionnaire: NDI:18 = 36% limited    Subjective Evaluation    History of Present Illness  Mechanism of injury: Pt has been experiencing R sided neck pain and dizziness beginning  about a year ago. Pt states the first episode she ended up in the ER at [place] and they thought it was inner ear. Pt states they kept her 24 hrs because she had no balance at all.     Pt states she saw ENT who saw pressure in the inner ear and they gave her something for that for about a week and they did 4 different tests. She was somewhat better after the meds. Pt states he doesn't think it's inner ear and thinks it might be more from arthritis in the neck, but she will be having more inner ear tests later this month to be sure.     Pt notes she has arthritis in the back and L hip, as well as OP. Pt denies bowel/bladder dysfunction. Pt denies UE pain or n/t along with this.     Pt denies spinning, but states she loses balance and falls to the left, right or backwards. Pt states it makes her really nauseous and the next day she feels a little off balance. Pt states she wakes up dizzy (sleeps on both sides) and gets dizzy with rolling in bed as well as looking up.     Pt reports just 1 fall in the last year with R foot fx (5th met) after catching her foot on the metal leg of a heavy dining room table when they were moving the refrigerator.  Pt twisted her R ankle when she went down and xray showed it was fractured and is now in a walking boot and using a RW which  she doesn't like to have to do. Pt states she sees the foot doctor tomorrow.     MRI brain without contrast 4/15/24: Impression: Chronic microvascular ischemic change. No acute intracranial process.    MRI brain and IAC with and without contrast 7/15/24: Impression: mild to mod age-related changes (per report: mild/mod generalized volume loss and typical pontine and periventricular leukomalacia). There is otherwise no evidence of acute infarct, hemorrhage, mass or mass effect.     Allergies  Allergen Reactions  · Penicillins Rash    PMH:   Diagnosis  · Cardiomyopathy  · Chronic coronary artery disease  · Chronic obstructive pulmonary disease  · Encounter for annual wellness exam in Medicare patient  · Gastroesophageal reflux disease  · Hearing loss  · Hypertension  · Osteoporosis  · Other screening mammogram  · Status post percutaneous transluminal coronary angioplasty  · Tobacco use  · Varicose veins of lower extremity  · Dyslipidemia  · Total, mature senile cataract  · Primary open angle glaucoma  · Presbyopia  · Nuclear senile cataract  · Posterior capsule opacification  · Dizziness  · Chronic radicular lumbar pain  Additional hx: Hx of non-ST elevation MI (NSTEMI), cataract, bursitis, arthritis, COVID, dyspnea, glaucoma)    PSH: cardiac surgery, coronary angioplasty with stent placement, tumor removal    Denies hx/current: pacemaker, DM, CVA, CA, seizures, latex allergies    Pain: 7/10 current, 3/10 at best, 8-9/10 at worst    Aggravating/functional factors: turning head to the R, looking up increases dizziness, lifting/carrying heavy items, pushing/pulling,   sleeping    PLOF:  no difficulty with any of the above before this issue began over the last year    Relieving factors: hot water running over the neck/hot compress, Tylenol for arthritis occasionally    Social Hx: lives with spouse; retired; likes to sew and work out in the yard, but hasn't been able to do either in a long time       Pain  Quality: sharp  and dull ache  Progression: worsening    Hand dominance: right    Treatments  Previous treatment: physical therapy  Patient Goals  Patient goals for therapy: decreased pain, increased strength, independence with ADLs/IADLs, increased motion and return to sport/leisure activities  Patient goal: Return to PLOF, be able to sew again         Objective          Active Range of Motion   Cervical/Thoracic Spine   Cervical    Flexion: 47 degrees   Extension: 30 degrees   Left lateral flexion: 5 degrees   Right lateral flexion: 8 degrees   Left rotation: 35 degrees   Right rotation: 30 degrees with pain    Additional Active Range of Motion Details  Catching R upper cerv with ext; gets dizzy in cerv ext  Pulling with lat flex B    Repeated mvmts: flex inc pain in same spot,     Ambulation     Comments   VESTIBULAR:  Vertigo / hypofunction    VOMS:      Baseline symptoms- 0           Smooth pursuit - 0 some difficulty tracking with diagonal mvmt up and to the L and coming down and to the R           Saccades vertical - 4           Saccades horizontal - 0           Convergence (near point) - WNL           VOR - horizontal - 0           VOR - vertical - 4             Visual Motion Sensitivity Test : deferred    [facility] [place] SOP: Deferred due to fx foot    Vertebral AA test: unable to test due to limited motion    SL Hallpike test: (+) R with dizziness and brief/mild nystagmus 2-3 beats (pt was moving eyes around so difficult to assess quality and duration of nystagmus); deferred SL Hallpike to the L    Horizontal Roll test: Deferred     Will assess/treat further in future sessions prn        Observation: pt in a walking boot R and using RW due to fx foot    Palpation: TTP/hypertonic B UT/LS (with trigger point R UT)    Sensation: intact/equal to LT B UEs except R biceps mildly reduced versus L    Posture: head fwd/rounded shoulders, Dowager's, increased kypholordosis; asymmetric shoulders (R lower from front, but appears higher from  back view), R scap more abducted and upwardly rotated than L in sitting    Gait: I with RW & walking boot on R LE (didn't use a walker until her recent fx R foot 5th metatarsal)    Transfers: I sit to/from stand with RW for support; PT A for S/L to sit with BPPV testing/Rx    Flexibility: tight pecs, UT/LS, SCM/scalenes    Assessment & Plan       Assessment  Impairments: abnormal coordination, abnormal gait, abnormal muscle firing, abnormal muscle tone, abnormal or restricted ROM, activity intolerance, impaired balance, impaired physical strength, lacks appropriate home exercise program, pain with function, safety issue and weight-bearing intolerance   Other impairment: pt with concurrent dizziness & R foot fx (5th met); not treating R foot, but once MD allows with fx healing, can work on balance due to dizziness if tolerated and limited  Assessment details: The patient is a 79 y.o. female who presents to physical therapy today for neck pain and  dizziness for the past year. Upon initial evaluation, the patient demonstrates the following impairments: pain, reduced posture, dizziness, decreased ROM/flexibility, strength, and function. Due to these impairments, the patient is unable to/limited with:  turning head to the R, looking up increases dizziness, lifting/carrying heavy items, pushing/pulling, and sleeping. The patient would benefit from skilled PT services to address functional limitations and impairments and to improve patient quality of life.      Barriers to therapy: cardiomyopathy, CAD, COPD, GERD, HTN, OP and dizziness could affect PT Rx/progress/outcomes if exacerbated/unregulated which could affect tolerance or compliance with PT/HEP  Prognosis: good    Goals  Plan Goals: STGs in 4 weeks:  Decrease neck pain to 6/10 on average   Increase neck AROM 5-10 degrees where limited as much  Pt will report 50% reduction in dizziness frequency and intensity  Assess UE strength as tolerated    LTGs by  discharge  Increase cerv AROM to WFL/WNL  Increase UE strength to 4+/5 or better  Pt will be able to turn her head to the R for ADLs without difficulty or pain   Pt will be able to lift/carry laundry baskets, garbage/grocery bags, and/or pots/pans without difficulty or pain (as able based on use of RW/walking boot due to fx)  Pt will be able to sleep without waking from pain most nights      Plan  Therapy options: will be seen for skilled therapy services  Planned modality interventions: cryotherapy, electrical stimulation/Russian stimulation, thermotherapy (hydrocollator packs) and ultrasound  Planned therapy interventions: transfer training, therapeutic activities, stretching, strengthening, spinal/joint mobilization, soft tissue mobilization, postural training, neuromuscular re-education, manual therapy, home exercise program, functional ROM exercises, flexibility, body mechanics training, ADL retraining, fine motor coordination training, joint mobilization and balance/weight-bearing training  Other planned therapy interventions: canalith repositioning  Frequency: 2x week  Duration in weeks: 13  Treatment plan discussed with: patient        History # of Personal Factors and/or Comorbidities: HIGH (3+)  Examination of Body System(s): # of elements: HIGH (4+)  Clinical Presentation: EVOLVING  Clinical Decision Making: MODERATE      Timed:         Manual Therapy:         mins  05622;     Therapeutic Exercise:   13    mins  31680;     Neuromuscular Kory:        mins  87271;    Therapeutic Activity:    10      mins  78888;     Gait Training:           mins  60738;     Ultrasound:          mins  94067;    Ionto                                   mins   24613  Self Care                            mins   67238      Un-Timed:  Electrical Stimulation:         mins  84628 ( );  Canalith Repos             8      mins  78933  Dry Needle 1-2 ms      ___  mins 20560  Dry Needle  3+ ms              mins 20561  Traction           mins 98757  Low Eval          Mins  01817  Mod Eval     44     Mins  15625  High Eval                            Mins  75383  Re-Eval                               mins  99045        Timed Treatment:   23   mins   Total Treatment:     75   mins            PT SIGNATURE: Shannan Welsh, DAVIE   [place] Lic# [id number]  DATE TREATMENT INITIATED: 8/5/2024    Medicare Initial Certification  Certification Period: 8/5/2024 through 11/2/2024  I certify that the therapy services are furnished while this patient is under my care.  The services outlined above are required by this patient, and will be reviewed every 90 days.         Physician Signature: _________________________  PHYSICIAN: Lana Lugo APRN   NPI: [id number]                                             DATE: _____________________________________    Please sign and return via fax to [phone]. Thank you, [facility].

## 2024-08-06 ENCOUNTER — OFFICE VISIT (OUTPATIENT)
Dept: PODIATRY | Facility: CLINIC | Age: 79
End: 2024-08-06
Payer: MEDICARE

## 2024-08-06 ENCOUNTER — TELEPHONE (OUTPATIENT)
Dept: FAMILY MEDICINE CLINIC | Facility: CLINIC | Age: 79
End: 2024-08-06
Payer: MEDICARE

## 2024-08-06 VITALS — OXYGEN SATURATION: 95 % | HEIGHT: 64 IN | HEART RATE: 65 BPM | BODY MASS INDEX: 24.92 KG/M2 | WEIGHT: 146 LBS

## 2024-08-06 DIAGNOSIS — S92.351A CLOSED DISPLACED FRACTURE OF FIFTH METATARSAL BONE OF RIGHT FOOT, INITIAL ENCOUNTER: ICD-10-CM

## 2024-08-06 DIAGNOSIS — M79.671 FOOT PAIN, RIGHT: Primary | ICD-10-CM

## 2024-08-06 NOTE — TELEPHONE ENCOUNTER
Alfreda called back and said she is needing the referral for Podiatry for  as she has an appointment for today , she was confused as she thought it was for orthopedic but she is needing it for  Podiatry

## 2024-08-07 NOTE — PROGRESS NOTES
08/06/2024  Foot and Ankle Surgery - New Patient   Provider: Dr. Devyn Hebert DPM  Location: [facility]    Subjective:  Alfreda Gruber is a 79 y.o. female.     Chief Complaint   Patient presents with    Right Foot - Pain       History of Present Illness  The patient is an 79-year-old female who presents for evaluation of a foot injury.    Two weeks ago, she sustained an injury to her foot. She sought medical attention at an urgent care facility on the 19th. That night, she experienced mild pain, which she initially dismissed. The following morning, her foot was discolored and swollen, extending up to her ankles. Since then, she has observed a decrease in swelling and bruising. She has no previous issues with her foot. Her physical activity is currently limited due to bursitis and arthritis in her left hip, which exacerbates her inability to bear weight on her foot. She has been using an Evenup boot, which she finds heavily uncomfortable and exacerbates her left hip discomfort.       Allergies   Allergen Reactions    Penicillins Rash       Past Medical History:   Diagnosis Date    Acute coronary syndrome 2015    Arthritis     Bursitis     rt. hip    CAD (coronary artery disease) 02/27/2015    Cardiomyopathy     Cataract     COPD (chronic obstructive pulmonary disease)     COVID 07/2022    Dyspnea     GERD (gastroesophageal reflux disease)     Glaucoma     Hx of non-ST elevation myocardial infarction (NSTEMI) 2015    Osteoporosis        Past Surgical History:   Procedure Laterality Date    CARDIAC SURGERY  02/27/2015    Heart Cath    CORONARY ANGIOPLASTY WITH STENT PLACEMENT  02/27/2015    STACEY: proximal RCA    TUMOR REMOVAL      from stomach       Family History   Problem Relation Age of Onset    Breast cancer Mother     Lung cancer Father     Heart disease Sister     Diabetes Sister     Stroke Sister     Heart attack Sister     Diabetes Paternal Grandfather        Social History     Socioeconomic  "History    Marital status:    Tobacco Use    Smoking status: Every Day     Current packs/day: 0.50     Average packs/day: 0.5 packs/day for 51.6 years (25.8 ttl pk-yrs)     Types: Cigarettes     Start date:      Passive exposure: Past    Smokeless tobacco: Never   Vaping Use    Vaping status: Never Used   Substance and Sexual Activity    Alcohol use: Never    Drug use: Never    Sexual activity: Defer        Current Outpatient Medications on File Prior to Visit   Medication Sig Dispense Refill    albuterol sulfate  (90 Base) MCG/ACT inhaler Inhale 2 puffs Every 4 (Four) Hours As Needed for Wheezing or Shortness of Air. 6.7 g 2    Albuterol-Budesonide 90-80 MCG/ACT aerosol Inhale by inhalation route.      aspirin 81 MG EC tablet Take 1 tablet by mouth Daily.      atorvastatin (LIPITOR) 40 MG tablet TAKE 1 TABLET BY MOUTH DAILY 90 tablet 3    fluticasone (FLONASE) 50 MCG/ACT nasal spray 2 sprays into the nostril(s) as directed by provider Daily As Needed for Rhinitis.      latanoprost (XALATAN) 0.005 % ophthalmic solution Administer 1 drop to both eyes Every Night.      metoprolol tartrate (LOPRESSOR) 25 MG tablet TAKE 1 TABLET BY MOUTH TWICE DAILY 180 tablet 2     No current facility-administered medications on file prior to visit.         Objective   Pulse 65   Ht 162.6 cm (64\")   Wt 66.2 kg (146 lb)   LMP  (LMP Unknown)   SpO2 95%   BMI 25.06 kg/m²     Foot/Ankle Exam    GENERAL  Appearance:  appears stated age  Orientation:  AAOx3  Affect:  appropriate    VASCULAR     Right Foot Vascularity   Dorsalis pedis:  2+  Posterior tibial:  2+  Skin temperature:  warm  Edema gradin+  CFT:  < 3 seconds  Pedal hair growth:  Present  Varicosities:  none     NEUROLOGIC     Right Foot Neurologic   Light touch sensation: normal  Hot/Cold sensation: normal  Achilles reflex:  2+    MUSCULOSKELETAL     Right Foot Musculoskeletal   Ecchymosis:  lateral foot  Tenderness:  lateral foot tenderness    Arch:  " Normal    DERMATOLOGIC      Right Foot Dermatologic   Skin  Right foot skin is intact.      Right foot additional comments: Discomfort with palpation to the fifth metatarsal region.  Range of motion, muscle strength, and instability testing deferred secondary to guarding.  No gross deformity.  No pain to the ankle or proximal lower extremity.    Physical Exam         Results  Imaging  Fracture on the fifth metatarsal with some gapping.       Assessment & Plan   Diagnoses and all orders for this visit:    1. Foot pain, right (Primary)    2. Closed displaced fracture of fifth metatarsal bone of right foot, initial encounter       Assessment & Plan    The fracture is consistent with a dancer's fracture, characterized by gapping between the bones. Surgical intervention is not deemed necessary at this stage. The use of a boot could potentially aid in healing of the fracture and symptoms in the foot, but could potentially offset hip issues. The use of an Evenup device was suggested, but she declined. She was advised to rest, apply ice, compress, and elevate the foot for symptom relief. The use of the boot for an additional 4 weeks was recommended. An Evenup device will be showed to her today.Reviewed proper basic stretching and manual therapy exercises along with appropriate shoes and activity.  Discussed proper use and/or avoidance of OTC anti-inflammatories.  Patient is to call with any additional issues or concerns.  Greater than 30 minutes was spent before, during, and after evaluation for patient care.    Follow-up  A follow-up appointment is scheduled for 4 weeks with repeat imaging.           Patient or patient representative verbalized consent for the use of Ambient Listening during the visit with  GEORGIA Hebert DPM for chart documentation. 8/7/2024  11:37 EDT    GEORGIA Hebert DPM

## 2024-08-08 ENCOUNTER — TREATMENT (OUTPATIENT)
Dept: PHYSICAL THERAPY | Facility: CLINIC | Age: 79
End: 2024-08-08
Payer: MEDICARE

## 2024-08-08 DIAGNOSIS — R42 VERTIGO: ICD-10-CM

## 2024-08-08 DIAGNOSIS — R42 DIZZINESS: ICD-10-CM

## 2024-08-08 DIAGNOSIS — M54.2 PAIN, NECK: Primary | ICD-10-CM

## 2024-08-08 NOTE — PROGRESS NOTES
Physical Therapy Treatment Note  [address] [place] [address]      Patient: Alfreda Gruber   : 1945  Diagnosis/ICD-10 Code:  Pain, neck [M54.2]  Referring practitioner: GRADY Pacheco  Date of Initial Visit: Type: THERAPY  Noted: 2024  Today's Date: 2024  Patient seen for 2 sessions           Visit Diagnoses:     ICD-10-CM ICD-9-CM   1. Pain, neck  M54.2 723.1   2. Dizziness  R42 780.4   3. Vertigo  R42 780.4       Subjective Alfreda Gruber reports: pt saw her foot doctor yesterday and is in the boot for 4 more weeks, then follow up again. Pt has only had a few small episodes of dizziness since last session. No questions on neck exs. Pt will be having more testing on her inner ear on 24. Pt notes the meds the doctor gave her did help her and reduced the fullness in the R ear. Pt states, it's starting to feel more full now since she's been off the meds. Pt states they told her when she was there last time that they couldn't do the testing they wanted to because of the ear pressures.     Pain: 6/10 at present    Objective   L S/L HP (-), R S/L HP (+) for dizziness and brief nystagmus, Roll test (-) B    See Exercise, Manual, and Modality Logs for complete treatment.     Patient Education: cues for therex; suggested pt contact referring to see if they need her to take more meds before her test since the pressure in the ear has returned; discussed that covering providers do not treat vertigo/dizziness, so that she can be seen for just the neck while primary PT is on vacation if tolerated    Assessment/Plan  Pt tolerated session well and appeared with some visibly improved mobility with turning the head and bending the neck. Still some discomfort at the neck with PT stabilization of the neck while performing testing and CRM for vertigo. Dizziness was up to an 8 with R S/L HP. Dizziness resolved with R Semont Liberatory CRM.     Continue with CRM prn next  session. Primary PT will be on vacation starting 8/14/24 and will return to work on 8/26/24. Pt understands that no one will be available to treat dizziness/vertigo, but will be able to treat the neck while primary PT is away. Pt voiced understanding.     Progress per Plan of Care and Progress strengthening /stabilization /functional activity            Timed:         Manual Therapy:         mins  92932;     Therapeutic Exercise:   18     mins  18229;     Neuromuscular Kory:        mins  55836;    Therapeutic Activity:    15      mins  81242;     Gait Training:           mins  98502;     Ultrasound:          mins  98925;    Ionto                                   mins   02372  Self Care                            mins   63574    Un-Timed:  Electrical Stimulation:         mins  57750 ( );  Traction          mins 36117  Canalith Repos              10      mins  01302  Dry Needle 1-2 ms      ___  mins 72038  Dry Needle  3+ ms              mins 31114  Low Eval          mins  19630  Mod Eval          Mins  13681  High Eval                            Mins  84822  Re-Eval                               mins  34758    Timed Treatment:   33   mins   Total Treatment:      43  mins          Shannan Welsh, PT    Physical Therapist

## 2024-08-12 ENCOUNTER — TREATMENT (OUTPATIENT)
Dept: PHYSICAL THERAPY | Facility: CLINIC | Age: 79
End: 2024-08-12
Payer: MEDICARE

## 2024-08-12 DIAGNOSIS — R42 DIZZINESS: ICD-10-CM

## 2024-08-12 DIAGNOSIS — R42 VERTIGO: ICD-10-CM

## 2024-08-12 DIAGNOSIS — M54.2 PAIN, NECK: Primary | ICD-10-CM

## 2024-08-12 PROCEDURE — 97140 MANUAL THERAPY 1/> REGIONS: CPT | Performed by: PHYSICAL THERAPIST

## 2024-08-12 PROCEDURE — 97110 THERAPEUTIC EXERCISES: CPT | Performed by: PHYSICAL THERAPIST

## 2024-08-14 ENCOUNTER — TREATMENT (OUTPATIENT)
Dept: PHYSICAL THERAPY | Facility: CLINIC | Age: 79
End: 2024-08-14
Payer: MEDICARE

## 2024-08-14 DIAGNOSIS — R42 VERTIGO: ICD-10-CM

## 2024-08-14 DIAGNOSIS — R42 DIZZINESS: ICD-10-CM

## 2024-08-14 DIAGNOSIS — M54.2 PAIN, NECK: Primary | ICD-10-CM

## 2024-08-14 PROCEDURE — 97140 MANUAL THERAPY 1/> REGIONS: CPT | Performed by: PHYSICAL THERAPIST

## 2024-08-14 PROCEDURE — 97110 THERAPEUTIC EXERCISES: CPT | Performed by: PHYSICAL THERAPIST

## 2024-08-14 NOTE — PROGRESS NOTES
Physical Therapy Daily Treatment Note  [id number] [place] [address]    Patient: Alfreda Gruber  : 1945  Referring practitioner: GRADY Pacheco  Date of Initial Visit: Type: THERAPY  Noted: 2024  Today's Date: 2024  Patient seen for 4 sessions      Visit Diagnoses:    ICD-10-CM ICD-9-CM   1. Pain, neck  M54.2 723.1   2. Dizziness  R42 780.4   3. Vertigo  R42 780.4       VISIT#: 4    Subjective   Alfreda Gruber reports that she has been feeling better since starting physical therapy.  She states that she still gets dizzy but mainly when she extends her neck.  She is scheduled to see Dr. Townsend on  for further vestibular/inner ear testing.  Pain Rating (0-10): 5    Objective     See Exercise, Manual, and Modality Logs for complete treatment.     Patient Education: discussed PT treatment plan and upcoming ENT appt    Assessment & Plan       Assessment  Assessment details: Patient presented with continued tightness in bilateral upper traps, R>L levator scapulae, and bilateral cervical paraspinals.  She responded well to manual therapy with a reduction in overall pain but she did have reproduction of dizziness with active cervical rotation and supine to sit transfers.         Progress per Plan of Care and Progress strengthening /stabilization /functional activity          Timed:         Manual Therapy:    23     mins  89312;     Therapeutic Exercise:    15     mins  58088;     Neuromuscular Kory:        mins  83503;    Therapeutic Activity:          mins  71108;     Gait Training:           mins  49346;     Ultrasound:          mins  99073;    Ionto                                   mins   84924  Self Care                            mins   91838    Un-Timed:  Electrical Stimulation:         mins  49300 ( );  Traction          mins 56219  Re-Eval                               mins  68161    No Charge:   Cryopack                         mins  Moist Heat                    10    mins    Timed Treatment:   38   mins   Total Treatment:     48   mins        Ethel King PT  Physical Therapist  [place] License: [id number]

## 2024-08-20 ENCOUNTER — TREATMENT (OUTPATIENT)
Dept: PHYSICAL THERAPY | Facility: CLINIC | Age: 79
End: 2024-08-20
Payer: MEDICARE

## 2024-08-20 DIAGNOSIS — R42 VERTIGO: ICD-10-CM

## 2024-08-20 DIAGNOSIS — R42 DIZZINESS: ICD-10-CM

## 2024-08-20 DIAGNOSIS — M54.2 PAIN, NECK: Primary | ICD-10-CM

## 2024-08-20 PROCEDURE — 97110 THERAPEUTIC EXERCISES: CPT | Performed by: PHYSICAL THERAPIST

## 2024-08-20 PROCEDURE — 97140 MANUAL THERAPY 1/> REGIONS: CPT | Performed by: PHYSICAL THERAPIST

## 2024-08-20 NOTE — PROGRESS NOTES
Physical Therapy Daily Treatment Note  [address] [place] [address]    Patient: Alfreda Gruber  : 1945  Referring practitioner: GRADY Pacheco  Date of Initial Visit: Type: THERAPY  Noted: 2024  Today's Date: 2024  Patient seen for 5 sessions      Visit Diagnoses:    ICD-10-CM ICD-9-CM   1. Pain, neck  M54.2 723.1   2. Dizziness  R42 780.4   3. Vertigo  R42 780.4       VISIT#: 5    Subjective   Alfreda Gruber reports that she saw her ENT yesterday (Dr. Townsend) and he told her that she didn't have to go through the special testing they had planned.  He told her that she had high ear pressure and she should use a nose spray for possible allergies.  She informed him that she was improving with PT and he was pleased.  He informed her to continue PT.  Pain Rating (0-10): 4    Objective     See Exercise, Manual, and Modality Logs for complete treatment.     Patient Education: Continue current HEP, monitor for dizzy symptoms    Assessment & Plan       Assessment  Assessment details: Patient presented with reports of mild cervical soreness and reports of vertigo with cervical extension and position changes.  She was better able to perform supine cervical ROM exercises without reproduction of dizziness.   She continues with significant tightness in bilateral upper traps and reduction in c-spine mobility.           Progress per Plan of Care and Progress strengthening /stabilization /functional activity.  30 day re-assessment due around 24.          Timed:         Manual Therapy:    23     mins  09537;     Therapeutic Exercise:    15     mins  87107;     Neuromuscular Kory:        mins  47449;    Therapeutic Activity:          mins  69228;     Gait Training:           mins  23641;     Ultrasound:          mins  93230;    Ionto                                   mins   03089  Self Care                            mins   60293    Un-Timed:  Electrical Stimulation:          mins  03345 ( );  Traction          mins 21740  Re-Eval                               mins  60328    No Charge:   Cryopack                         mins  Moist Heat                   10    mins    Timed Treatment:   38   mins   Total Treatment:     48   mins        Ethel King PT  Physical Therapist  [place] License: [id number]

## 2024-08-22 ENCOUNTER — TREATMENT (OUTPATIENT)
Dept: PHYSICAL THERAPY | Facility: CLINIC | Age: 79
End: 2024-08-22
Payer: MEDICARE

## 2024-08-22 DIAGNOSIS — R42 DIZZINESS: ICD-10-CM

## 2024-08-22 DIAGNOSIS — R42 VERTIGO: ICD-10-CM

## 2024-08-22 DIAGNOSIS — M54.2 PAIN, NECK: Primary | ICD-10-CM

## 2024-08-22 PROCEDURE — 97140 MANUAL THERAPY 1/> REGIONS: CPT | Performed by: PHYSICAL THERAPIST

## 2024-08-22 PROCEDURE — 97110 THERAPEUTIC EXERCISES: CPT | Performed by: PHYSICAL THERAPIST

## 2024-08-22 NOTE — PROGRESS NOTES
Physical Therapy Daily Treatment Note  [address] [place] [address]    Patient: Alfreda Gruber  : 1945  Referring practitioner: GRADY Pacheco  Date of Initial Visit: Type: THERAPY  Noted: 2024  Today's Date: 2024  Patient seen for 6 sessions      Visit Diagnoses:    ICD-10-CM ICD-9-CM   1. Pain, neck  M54.2 723.1   2. Dizziness  R42 780.4   3. Vertigo  R42 780.4       VISIT#: 6    Subjective   Alfreda Gruber reports that she is doing a lot better.  She states that her pain is becoming more focused in the center of her neck.  She is pleased with her progress in PT.   Unrelated to her neck, she was released from her walking boot for her foot fracture and was able to ambulate into the clinic this morning without an assistive device.  Pain Rating (0-10): 3-4/10    Objective     See Exercise, Manual, and Modality Logs for complete treatment.     Patient Education: Discussed careful progression to full weightbearing to reduce risk of falls    Assessment & Plan       Assessment  Assessment details: Patient presented to physical therapy with familiar neck pain.  She denied any sensation of dizzy feelings with today's exercises.  She was able to tolerate further progression of scapular stabilization and posture correction.  Weightbearing activities were limited today due to recent foot fracture and newly released full weightbearing.   Moist heat was continued post-treatment for further reduction in pain and muscle tension.         Progress per Plan of Care and Progress strengthening /stabilization /functional activity          Timed:         Manual Therapy:    15     mins  11114;     Therapeutic Exercise:    23     mins  63230;     Neuromuscular Kory:        mins  02046;    Therapeutic Activity:          mins  65872;     Gait Training:           mins  13474;     Ultrasound:          mins  05520;    Ionto                                   mins   67687  Self Care                             mins   91279    Un-Timed:  Electrical Stimulation:         mins  35110 ( );  Traction          mins 58723  Re-Eval                               mins  34209    No Charge:   Cryopack                         mins  Moist Heat                   10    mins    Timed Treatment:   38   mins   Total Treatment:     48   mins        Ethel King PT  Physical Therapist  [place] License: [id number]

## 2024-08-27 ENCOUNTER — TREATMENT (OUTPATIENT)
Dept: PHYSICAL THERAPY | Facility: CLINIC | Age: 79
End: 2024-08-27
Payer: MEDICARE

## 2024-08-27 DIAGNOSIS — M54.2 PAIN, NECK: Primary | ICD-10-CM

## 2024-08-27 DIAGNOSIS — R42 VERTIGO: ICD-10-CM

## 2024-08-27 DIAGNOSIS — R42 DIZZINESS: ICD-10-CM

## 2024-08-27 NOTE — PROGRESS NOTES
Physical Therapy Treatment Note  [address] [place] [address]      Patient: Alfreda Gruber   : 1945  Diagnosis/ICD-10 Code:  Pain, neck [M54.2]  Referring practitioner: GRADY Pacheco  Date of Initial Visit: Type: THERAPY  Noted: 2024  Today's Date: 2024  Patient seen for 7 sessions           Visit Diagnoses:     ICD-10-CM ICD-9-CM   1. Pain, neck  M54.2 723.1   2. Dizziness  R42 780.4   3. Vertigo  R42 780.4       Subjective Alfreda Gruber reports: she got her boot off of her leg and states it was wearing the top of her foot raw. Pt states he told her she could use her regular shoe. Dizziness has not recurred and pain is much better, but still mild pain. Pt states still some stiffness at the neck and has been trying to do extra of her stretches and exs to see if that helps, but it seems to stay stiff.     Objective     See Exercise, Manual, and Modality Logs for complete treatment.     Patient Education: cues for therex/NMR for form, posture, reps, and holds    Assessment/Plan Issued pictures of SCM stretches. Pt tolerated fairly well, but mild discomfort if tilted too far back. Pt continues to require cuing for proper form and improved posture during session.       Progress per Plan of Care and Progress strengthening /stabilization /functional activity            Timed:         Manual Therapy:   15     mins  84594;     Therapeutic Exercise:   8     mins  18592;     Neuromuscular Kory:  10      mins  53283;    Therapeutic Activity:          mins  92092;     Gait Training:           mins  14355;     Ultrasound:          mins  25716;    Ionto                                   mins   16636  Self Care                            mins   52501    Un-Timed:  Electrical Stimulation:         mins  34316 ( );  Traction          mins 89909  Canalith Repos                   mins  52555  Dry Needle 1-2 ms      ___  mins 91141  Dry Needle  3+ ms               mins 20839  Low Eval          mins  52651  Mod Eval          Mins  59274  High Eval                            Mins  77397  Re-Eval                               mins  18597    Timed Treatment:   33   mins   Total Treatment:     33  mins          Shannan Welsh, PT    Physical Therapist

## 2024-08-30 ENCOUNTER — TREATMENT (OUTPATIENT)
Dept: PHYSICAL THERAPY | Facility: CLINIC | Age: 79
End: 2024-08-30
Payer: MEDICARE

## 2024-08-30 DIAGNOSIS — M54.2 PAIN, NECK: Primary | ICD-10-CM

## 2024-08-30 DIAGNOSIS — R42 DIZZINESS: ICD-10-CM

## 2024-08-30 PROCEDURE — 97112 NEUROMUSCULAR REEDUCATION: CPT | Performed by: PHYSICAL THERAPIST

## 2024-08-30 PROCEDURE — 97110 THERAPEUTIC EXERCISES: CPT | Performed by: PHYSICAL THERAPIST

## 2024-08-30 NOTE — PROGRESS NOTES
Physical Therapy Treatment Note  [address] [place] [address]      Patient: Alfreda Gruebr   : 1945  Diagnosis/ICD-10 Code:  Pain, neck [M54.2]  Referring practitioner: GRADY Pacheco  Date of Initial Visit: Type: THERAPY  Noted: 2024  Today's Date: 2024  Patient seen for 8 sessions           Visit Diagnoses:     ICD-10-CM ICD-9-CM   1. Pain, neck  M54.2 723.1   2. Dizziness  R42 780.4       Subjective Alfreda Gruber reports: no pain today at rest, but still feels like the muscles are tight. Pt does get some pain ~1-2 with SB R at the R cerv region especially if moves too quickly. Function is much improved and dizziness has been resolved.    Objective     See Exercise, Manual, and Modality Logs for complete treatment.     Patient Education: cues for therex for posture, form, reps/holds and to avoid compensation with trunk/UT    Assessment/Plan Pt does still require frequent cues for proper form avoiding compensation and maintaining improved posture. Visually improved neck rots & SBs noted especially with SBs after contract/relax.    Progress note next week.     Progress per Plan of Care and Progress strengthening /stabilization /functional activity            Timed:         Manual Therapy:   10      mins  39890;     Therapeutic Exercise:   12   mins  86296;     Neuromuscular Kory:  13      mins  85732;    Therapeutic Activity:          mins  72849;     Gait Training:           mins  47779;     Ultrasound:          mins  03806;    Ionto                                   mins   63252  Self Care                            mins   96163    Un-Timed:  Electrical Stimulation:         mins  90532 ( );  Traction          mins 71395  Canalith Repos                   mins  55287  Dry Needle 1-2 ms      ___  mins 31196  Dry Needle  3+ ms              mins 92105  Low Eval          mins  67646  Mod Eval          Mins  02867  High Eval                             Mins  32833  Re-Eval                               mins  13081    Timed Treatment:   35   mins   Total Treatment:     35   mins          Shannan Welsh, PT    Physical Therapist

## 2024-09-03 ENCOUNTER — OFFICE VISIT (OUTPATIENT)
Dept: PODIATRY | Facility: CLINIC | Age: 79
End: 2024-09-03
Payer: MEDICARE

## 2024-09-03 VITALS — BODY MASS INDEX: 24.92 KG/M2 | HEIGHT: 64 IN | RESPIRATION RATE: 20 BRPM | WEIGHT: 146 LBS

## 2024-09-03 DIAGNOSIS — M79.671 FOOT PAIN, RIGHT: Primary | ICD-10-CM

## 2024-09-03 DIAGNOSIS — S92.351D CLOSED DISPLACED FRACTURE OF FIFTH METATARSAL BONE OF RIGHT FOOT WITH ROUTINE HEALING, SUBSEQUENT ENCOUNTER: ICD-10-CM

## 2024-09-04 ENCOUNTER — TREATMENT (OUTPATIENT)
Dept: PHYSICAL THERAPY | Facility: CLINIC | Age: 79
End: 2024-09-04
Payer: MEDICARE

## 2024-09-04 DIAGNOSIS — R42 VERTIGO: ICD-10-CM

## 2024-09-04 DIAGNOSIS — M54.2 PAIN, NECK: Primary | ICD-10-CM

## 2024-09-04 DIAGNOSIS — R42 DIZZINESS: ICD-10-CM

## 2024-09-04 NOTE — PROGRESS NOTES
Physical Therapy Treatment Note/Progress Note  [address] [place] [address]      Patient: Alfreda Gruber   : 1945  Diagnosis/ICD-10 Code:  Pain, neck [M54.2]  Referring practitioner: GRADY Pacheco  Date of Initial Visit: Type: THERAPY  Noted: 2024  Today's Date: 2024  Patient seen for 9 sessions           Visit Diagnoses:     ICD-10-CM ICD-9-CM   1. Pain, neck  M54.2 723.1   2. Dizziness  R42 780.4   3. Vertigo  R42 780.4         Subjective Questionnaire: NDI: 9 = 18% limited     Subjective Alfreda Gruber reports: significant improvement, but still getting pain 2-3 on average and 3 at worst when occurs generally with rotating the head R. Pt no longer gets dizzy.     Aggravating/functional factors: turning head to the R, lifting/carrying heavy items more due to her foot issue which is still healing, pushing/pulling, sleeping     Objective     Active Range of Motion   Cervical/Thoracic Spine   Cervical     Flexion: 50 degrees   Extension: 42 degrees   Left lateral flexion: 20 degrees   Right lateral flexion: 22 degrees with pain   Left rotation: 38 degrees   Right rotation: 43 degrees with pain at times, none after did contract/relax today    Strength  UE myotomes grossly 4+ except L shld abd 4- and L finger flex/abd 4- to 4        Ambulation      Comments   VESTIBULAR:  Vertigo / hypofunction     VOMS:      Baseline symptoms- 0           Saccades vertical - 0           VOR - vertical - 0     See Exercise, Manual, and Modality Logs for complete treatment.     Patient Education: cues for therex; instr in contract/relax to improve mobility and reduce pain with rotation R & can perform in any direction if tight    Assessment/Plan Pt with significant improvements in pain levels which are no more than 3/10 at worst now. Pt is demonstrating improved mobility, strength and function. Dizziness has been resolved as well.    Pt is still limited with turning head to  the R, lifting/carrying heavy items more due to her foot issue which is still healing, pushing/pulling, and sleeping.     Pt has met 3 goals, partially met 3 goals, and is progressing with 3 goals. Current Goals/POC continue to be appropriate for this pt. Recommend continued skilled PT with current POC/interventions until goal attainment, I HEP and return to PLOF. Current POC expires 11/2/24. Pt will be seen 2 more times next week, then will be on vacation and will resume upon return.       Goals  Plan Goals: STGs in 4 weeks:  Decrease neck pain to 6/10 on average Met 9/4  Increase neck AROM 5-10 degrees where limited as much Partially Met 9/4  Pt will report 50% reduction in dizziness frequency and intensity Met 9/4  Assess UE strength as tolerated Met 9/4     LTGs by discharge  Increase cerv AROM to WFL/WNL Progressing 9/4  Increase UE strength to 4+/5 or better Partially Met 9/4  Pt will be able to turn her head to the R for ADLs without difficulty or pain Progressing 9/4  Pt will be able to lift/carry laundry baskets, garbage/grocery bags, and/or pots/pans without difficulty or pain (as able based on use of RW/walking boot due to fx) Partially Met 9/4 - being careful still due to her foot fx  healing  Pt will be able to sleep without waking from pain most nights Progressing 9/4         Progress per Plan of Care and Progress strengthening /stabilization /functional activity            Timed:         Manual Therapy:    8     mins  20184;     Therapeutic Exercise:    22     mins  28698;     Neuromuscular Kory:   8     mins  07481;    Therapeutic Activity:          mins  66493;     Gait Training:           mins  00453;     Ultrasound:          mins  12395;    Ionto                                   mins   52671  Self Care                            mins   93450    Un-Timed:  Electrical Stimulation:         mins  32172 (MC );  Traction          mins 34644  Canalith Repos                   mins  09826  Dry Needle  1-2 ms      ___  mins 49330  Dry Needle  3+ ms              mins 20561  Low Eval          mins  52571  Mod Eval          Mins  14261  High Eval                            Mins  43058  Re-Eval                               mins  43075    Timed Treatment:   38   mins   Total Treatment:     38   mins          Shannan Welsh, PT    Physical Therapist

## 2024-09-04 NOTE — PROGRESS NOTES
"09/03/2024  Foot and Ankle Surgery - Established Patient/Follow-up  Provider: Dr. Devyn Hebert DPM  Location: [facility]    Subjective:  Alfreda Gruber is a 79 y.o. female.     Chief Complaint   Patient presents with    Right Foot - Follow-up, Fracture     Closed displaced fx of 5th metatarsal bone     Follow-up     ETLON Islas md        History of Present Illness  The patient presents for evaluation of foot pain. She is accompanied by an adult male.    She reports a significant improvement in her condition. She has been able to wear her regular shoes since last week, although she experiences mild discomfort when stepping down. The accompanying swelling has also reduced. She has not engaged in any recreational walking or exercise. She mentions that the boot was causing friction against her foot, leading to a burning sensation, which prompted her to remove it.      Allergies   Allergen Reactions    Penicillins Rash       Current Outpatient Medications on File Prior to Visit   Medication Sig Dispense Refill    albuterol sulfate  (90 Base) MCG/ACT inhaler Inhale 2 puffs Every 4 (Four) Hours As Needed for Wheezing or Shortness of Air. 6.7 g 2    Albuterol-Budesonide 90-80 MCG/ACT aerosol Inhale by inhalation route.      aspirin 81 MG EC tablet Take 1 tablet by mouth Daily.      atorvastatin (LIPITOR) 40 MG tablet TAKE 1 TABLET BY MOUTH DAILY 90 tablet 3    fluticasone (FLONASE) 50 MCG/ACT nasal spray 2 sprays into the nostril(s) as directed by provider Daily As Needed for Rhinitis.      latanoprost (XALATAN) 0.005 % ophthalmic solution Administer 1 drop to both eyes Every Night.      metoprolol tartrate (LOPRESSOR) 25 MG tablet TAKE 1 TABLET BY MOUTH TWICE DAILY 180 tablet 2     No current facility-administered medications on file prior to visit.       Objective   Resp 20   Ht 162.6 cm (64\")   Wt 66.2 kg (146 lb)   LMP  (LMP Unknown)   BMI 25.06 kg/m²     Foot/Ankle Exam  Physical " Exam  GENERAL  Appearance:  appears stated age  Orientation:  AAOx3  Affect:  appropriate     VASCULAR      Right Foot Vascularity   Dorsalis pedis:  2+  Posterior tibial:  2+  Skin temperature:  warm  Edema gradin+  CFT:  < 3 seconds  Pedal hair growth:  Present  Varicosities:  none     NEUROLOGIC      Right Foot Neurologic   Light touch sensation: normal  Hot/Cold sensation: normal  Achilles reflex:  2+     MUSCULOSKELETAL      Right Foot Musculoskeletal   Ecchymosis:  lateral foot  Tenderness:  lateral foot tenderness    Arch:  Normal     DERMATOLOGIC       Right Foot Dermatologic   Skin  Right foot skin is intact.      Right foot additional comments: Discomfort with palpation to the fifth metatarsal region.  Range of motion, muscle strength, and instability testing deferred secondary to guarding.  No gross deformity.  No pain to the ankle or proximal lower extremity.    9/3/24: Less discomfort, swelling, and bruising.  Improved range of motion.  No significant pain with palpation involving the fracture site.      Results  Imaging  X-ray shows early signs of healing in the foot.    Assessment & Plan   Diagnoses and all orders for this visit:    1. Foot pain, right (Primary)    2. Closed displaced fracture of fifth metatarsal bone of right foot with routine healing, subsequent encounter  -     XR Foot 3+ View Right      Assessment & Plan    The patient's discomfort in the foot is improving. She has transitioned back to wearing regular shoes as of last week. Swelling has decreased significantly. X-rays show early signs of healing, but complete stabilization is expected to take 10+ weeks. She was advised to wear supportive tennis shoes and avoid uneven terrain. It was explained that she might still experience some swelling and discomfort, especially at the end of the day, which is normal. For particularly painful days, the use of a boot was suggested. If the boot causes discomfort, she can discontinue its use.  Reviewed proper basic stretching and manual therapy exercises along with appropriate shoes and activity.  Discussed proper use and/or avoidance of OTC anti-inflammatories.  Patient is to call with any additional issues or concerns.  Greater than 20 minutes was spent before, during, and after evaluation for patient care.    Patient is to return in 6 weeks for reevaluation and repeat imaging                 Patient or patient representative verbalized consent for the use of Ambient Listening during the visit with  GEORGIA Hebert DPM for chart documentation. 9/4/2024  07:39 EDT    GEORGIA Hebert DPM

## 2024-09-06 ENCOUNTER — TREATMENT (OUTPATIENT)
Dept: PHYSICAL THERAPY | Facility: CLINIC | Age: 79
End: 2024-09-06
Payer: MEDICARE

## 2024-09-06 DIAGNOSIS — R42 VERTIGO: ICD-10-CM

## 2024-09-06 DIAGNOSIS — M54.2 PAIN, NECK: Primary | ICD-10-CM

## 2024-09-06 DIAGNOSIS — R42 DIZZINESS: ICD-10-CM

## 2024-09-06 NOTE — PROGRESS NOTES
Physical Therapy Treatment Note  [id number] [place] [address]      Patient: Alfreda Gruber   : 1945  Diagnosis/ICD-10 Code:  Pain, neck [M54.2]  Referring practitioner: GRADY Pacheco  Date of Initial Visit: Type: THERAPY  Noted: 2024  Today's Date: 2024  Patient seen for 10 sessions           Visit Diagnoses:     ICD-10-CM ICD-9-CM   1. Pain, neck  M54.2 723.1   2. Dizziness  R42 780.4   3. Vertigo  R42 780.4       Subjective Alfreda Gruber reports: no pain today with entering the clinic. Still painful with turning head R.     Objective   Poor posture with head fwd & tilted slightly toward the R    See Exercise, Manual, and Modality Logs for complete treatment.     Patient Education: cues for therex posture/form/reps/holds; encouraged improved posture and avoiding lat flex in resting position    Assessment/Plan Pt with pain during neck rotation in sitting. Added neck rot in flex in sitting followed by with head erect (head forward position) with some reduced pain versus before perform in flex. Pt requires cues for form/posture throughout session. Added shld/scap decompression during manual which did help relax UT/lev scap B. Pt did not require modalities at end of session.       Progress per Plan of Care and Progress strengthening /stabilization /functional activity            Timed:         Manual Therapy:   12      mins  06326;     Therapeutic Exercise:    8     mins  94646;     Neuromuscular Kory:  3      mins  38979;    Therapeutic Activity:          mins  24772;     Gait Training:           mins  61124;     Ultrasound:          mins  56923;    Ionto                                   mins   29005  Self Care                            mins   96092    Un-Timed:  Electrical Stimulation:         mins  88225 ( );  Traction          mins 40460  Canalith Repos                   mins  23405  Dry Needle 1-2 ms      ___  mins 10541  Dry Needle  3+ ms               mins 79156  Low Eval          mins  89386  Mod Eval          Mins  76224  High Eval                            Mins  30878  Re-Eval                               mins  56080    Timed Treatment:   23   mins   Total Treatment:     23   mins          Shannan Welsh, PT    Physical Therapist

## 2024-09-10 ENCOUNTER — TREATMENT (OUTPATIENT)
Dept: PHYSICAL THERAPY | Facility: CLINIC | Age: 79
End: 2024-09-10
Payer: MEDICARE

## 2024-09-10 DIAGNOSIS — R42 VERTIGO: ICD-10-CM

## 2024-09-10 DIAGNOSIS — M54.2 PAIN, NECK: Primary | ICD-10-CM

## 2024-09-10 DIAGNOSIS — R42 DIZZINESS: ICD-10-CM

## 2024-09-10 PROCEDURE — 97140 MANUAL THERAPY 1/> REGIONS: CPT | Performed by: PHYSICAL THERAPIST

## 2024-09-10 PROCEDURE — 97110 THERAPEUTIC EXERCISES: CPT | Performed by: PHYSICAL THERAPIST

## 2024-09-10 PROCEDURE — 97112 NEUROMUSCULAR REEDUCATION: CPT | Performed by: PHYSICAL THERAPIST

## 2024-09-10 NOTE — PROGRESS NOTES
Physical Therapy Treatment Note  [place]    [address] [place] [address]      Patient: Alfreda Gruber   : 1945  Diagnosis/ICD-10 Code:  Pain, neck [M54.2]  Referring practitioner: GRADY Pacheco  Date of Initial Visit: Type: THERAPY  Noted: 2024  Today's Date: 9/10/2024  Patient seen for 11 sessions           Visit Diagnoses:     ICD-10-CM ICD-9-CM   1. Pain, neck  M54.2 723.1   2. Dizziness  R42 780.4   3. Vertigo  R42 780.4       Subjective Alfreda Gruber reports: pain is 1/10 at present. Overall doing very well, but gets intermittent pain with catching when turning the head to the R. Pt notes that if she turns both ways a few times, that alleviates it. Pt also notes that it's no longer happening every time she turns R.     Objective     See Exercise, Manual, and Modality Logs for complete treatment.     Patient Education: cues for therex/NMR for posture/alignment; suggested doing stretches and or standing postural correx with a mirror for visual feedback    Assessment/Plan  Added standing posture against wall and thor ext/rot and following with head/neck. Pt tolerated well with increased resistance and new tasks although was feeling some popping at the neck in both directions. Pt continues to require cuing for form with stretches and most tasks for optimal performance.     Pt did have mild dizziness with moving supine to sit so had to rest for a brief moment before continuing with therex. Will monitor and add back BPPV testing/Rx prn in future visits.     Progress per Plan of Care and Progress strengthening /stabilization /functional activity            Timed:         Manual Therapy:   14      mins  28153;     Therapeutic Exercise:   23     mins  60363;     Neuromuscular Kory:  18      mins  71160;    Therapeutic Activity:          mins  25380;     Gait Training:           mins  46572;     Ultrasound:          mins  27375;    Ionto                                    mins   65919  Self Care                            mins   37002    Un-Timed:  Electrical Stimulation:         mins  73917 ( );  Traction          mins 93693  Canalith Repos                   mins  10873  Dry Needle 1-2 ms      ___  mins 72370  Dry Needle  3+ ms              mins 18471  Low Eval          mins  48997  Mod Eval          Mins  44919  High Eval                            Mins  35541  Re-Eval                               mins  83436    Timed Treatment:  55    mins   Total Treatment:     55   mins          Shannan Welsh PT    Physical Therapist

## 2024-09-12 ENCOUNTER — TREATMENT (OUTPATIENT)
Dept: PHYSICAL THERAPY | Facility: CLINIC | Age: 79
End: 2024-09-12
Payer: MEDICARE

## 2024-09-12 DIAGNOSIS — R42 DIZZINESS: ICD-10-CM

## 2024-09-12 DIAGNOSIS — R42 VERTIGO: ICD-10-CM

## 2024-09-12 DIAGNOSIS — M54.2 PAIN, NECK: Primary | ICD-10-CM

## 2024-09-12 NOTE — PROGRESS NOTES
Physical Therapy Treatment Note  [address] [place] [address]      Patient: Alfreda Gruber   : 1945  Diagnosis/ICD-10 Code:  Pain, neck [M54.2]  Referring practitioner: GRADY Pacheco  Date of Initial Visit: Type: THERAPY  Noted: 2024  Today's Date: 2024  Patient seen for 12 sessions           Visit Diagnoses:     ICD-10-CM ICD-9-CM   1. Pain, neck  M54.2 723.1   2. Dizziness  R42 780.4   3. Vertigo  R42 780.4       Subjective Alfreda Gruber reports: although she doesn't like the lev scap manual stretch, she states it really made it feel better afterwards. Pt states she only felt the catch a couple of times since last session and it was less intense.     Objective   Reduced hypertonus noted with manual pre to post at UT/LS/cerv pvs    See Exercise, Manual, and Modality Logs for complete treatment.     Patient Education: cues for therex/NMR/theracts    Assessment/Plan Deferred bands today and added supine chest press and pec flies. Pt was able to perform with 1# for chest press, but that was too heavy for pec flies so performed as AROM. Tolerated remainder of tasks well and without any increased pain.     Pt will be going on vacation for a week and will return 24.       Progress per Plan of Care and Progress strengthening /stabilization /functional activity            Timed:         Manual Therapy:   12      mins  12378;     Therapeutic Exercise:   16     mins  55241;     Neuromuscular Kory:   8      mins  94170;    Therapeutic Activity:     2     mins  48254;     Gait Training:           mins  89242;     Ultrasound:          mins  27595;    Ionto                                   mins   24862  Self Care                            mins   60314    Un-Timed:  Electrical Stimulation:         mins  27599 (MC );  Traction          mins 36224  Canalith Repos                   mins  43031  Dry Needle 1-2 ms      ___  mins 43643  Dry Needle  3+ ms               mins 22906  Low Eval          mins  50900  Mod Eval          Mins  73726  High Eval                            Mins  14814  Re-Eval                               mins  22712    Timed Treatment:  38    mins   Total Treatment:    38   mins          Shannan Welsh, PT    Physical Therapist

## 2024-09-24 ENCOUNTER — TREATMENT (OUTPATIENT)
Dept: PHYSICAL THERAPY | Facility: CLINIC | Age: 79
End: 2024-09-24
Payer: MEDICARE

## 2024-09-24 DIAGNOSIS — R42 DIZZINESS: ICD-10-CM

## 2024-09-24 DIAGNOSIS — M54.2 PAIN, NECK: Primary | ICD-10-CM

## 2024-09-24 DIAGNOSIS — R42 VERTIGO: ICD-10-CM

## 2024-09-24 PROCEDURE — 97110 THERAPEUTIC EXERCISES: CPT | Performed by: PHYSICAL THERAPIST

## 2024-09-24 PROCEDURE — 97140 MANUAL THERAPY 1/> REGIONS: CPT | Performed by: PHYSICAL THERAPIST

## 2024-10-01 ENCOUNTER — TREATMENT (OUTPATIENT)
Dept: PHYSICAL THERAPY | Facility: CLINIC | Age: 79
End: 2024-10-01
Payer: MEDICARE

## 2024-10-01 DIAGNOSIS — R42 VERTIGO: ICD-10-CM

## 2024-10-01 DIAGNOSIS — M54.2 PAIN, NECK: Primary | ICD-10-CM

## 2024-10-01 DIAGNOSIS — R42 DIZZINESS: ICD-10-CM

## 2024-10-01 PROCEDURE — 97140 MANUAL THERAPY 1/> REGIONS: CPT | Performed by: PHYSICAL THERAPIST

## 2024-10-01 PROCEDURE — 97110 THERAPEUTIC EXERCISES: CPT | Performed by: PHYSICAL THERAPIST

## 2024-10-01 NOTE — PROGRESS NOTES
Physical Therapy Treatment Note  [address] [place] [address]      Patient: Alfreda Gruber   : 1945  Diagnosis/ICD-10 Code:  Pain, neck [M54.2]  Referring practitioner: GRADY Pacheco  Date of Initial Visit: Type: THERAPY  Noted: 2024  Today's Date: 10/1/2024  Patient seen for 14 sessions           Visit Diagnoses:     ICD-10-CM ICD-9-CM   1. Pain, neck  M54.2 723.1   2. Dizziness  R42 780.4   3. Vertigo  R42 780.4       Subjective Alfreda Gruber reports: she is still getting intermittent pain at the R cerv region and it feels like it catches. This happens mostly with sitting. Pt continues to report no dizziness.     Pain: 0     Objective     Active Range of Motion   Cervical/Thoracic Spine   Cervical     Flexion: 53 degrees   Extension: 38 degrees pre exs, 45 after exs  Left lateral flexion: 25 degrees   Right lateral flexion: 35 degrees with pain pre exs, no pain after  Left rotation: 55 degrees   Right rotation: 50 degrees with pain pre exs, no pain after    See Exercise, Manual, and Modality Logs for complete treatment.     Patient Education: cues for therex    Assessment/Plan Added contract relax to pt's stretching, both manual and self-stretch. Pt with good tolerance and noted no pain afterwards. Significant improvement noted in ROM. Pt will try I HEP for a couple of weeks. POC will remain open in case of exacerbation. Pt will require new order if after 24.     Other            Timed:         Manual Therapy:  23       mins  77124;     Therapeutic Exercise:  15       mins  87222;     Neuromuscular Kory:        mins  24551;    Therapeutic Activity:          mins  18205;     Gait Training:           mins  52669;     Ultrasound:          mins  88747;    Ionto                                   mins   54069  Self Care                            mins   32722    Un-Timed:  Electrical Stimulation:         mins  98821 ( );  Traction          mins  11284  [facility]                   mins  96764  Dry Needle 1-2 ms      ___  mins 71356  Dry Needle  3+ ms              mins 15197  Low Eval          mins  58823  Mod Eval          Mins  69529  High Eval                            Mins  42153  Re-Eval                               mins  09697    Timed Treatment:  38    mins   Total Treatment:    38    mins          Shannan Welsh, PT    Physical Therapist

## 2024-10-16 ENCOUNTER — OFFICE VISIT (OUTPATIENT)
Dept: PODIATRY | Facility: CLINIC | Age: 79
End: 2024-10-16
Payer: MEDICARE

## 2024-10-16 ENCOUNTER — OFFICE VISIT (OUTPATIENT)
Dept: FAMILY MEDICINE CLINIC | Facility: CLINIC | Age: 79
End: 2024-10-16
Payer: MEDICARE

## 2024-10-16 VITALS
HEIGHT: 64 IN | SYSTOLIC BLOOD PRESSURE: 120 MMHG | OXYGEN SATURATION: 96 % | DIASTOLIC BLOOD PRESSURE: 72 MMHG | HEART RATE: 60 BPM | BODY MASS INDEX: 24.41 KG/M2 | WEIGHT: 143 LBS | TEMPERATURE: 97.9 F

## 2024-10-16 VITALS — WEIGHT: 146 LBS | BODY MASS INDEX: 24.92 KG/M2 | HEIGHT: 64 IN | RESPIRATION RATE: 20 BRPM

## 2024-10-16 DIAGNOSIS — Z23 NEED FOR IMMUNIZATION AGAINST INFLUENZA: ICD-10-CM

## 2024-10-16 DIAGNOSIS — I10 PRIMARY HYPERTENSION: ICD-10-CM

## 2024-10-16 DIAGNOSIS — Z00.00 ENCOUNTER FOR ANNUAL WELLNESS EXAM IN MEDICARE PATIENT: Primary | ICD-10-CM

## 2024-10-16 DIAGNOSIS — E78.5 DYSLIPIDEMIA: ICD-10-CM

## 2024-10-16 DIAGNOSIS — S92.351D CLOSED DISPLACED FRACTURE OF FIFTH METATARSAL BONE OF RIGHT FOOT WITH ROUTINE HEALING, SUBSEQUENT ENCOUNTER: ICD-10-CM

## 2024-10-16 DIAGNOSIS — M81.0 OSTEOPOROSIS, UNSPECIFIED OSTEOPOROSIS TYPE, UNSPECIFIED PATHOLOGICAL FRACTURE PRESENCE: ICD-10-CM

## 2024-10-16 DIAGNOSIS — M79.671 FOOT PAIN, RIGHT: Primary | ICD-10-CM

## 2024-10-16 PROCEDURE — 1159F MED LIST DOCD IN RCRD: CPT | Performed by: FAMILY MEDICINE

## 2024-10-16 PROCEDURE — G0008 ADMIN INFLUENZA VIRUS VAC: HCPCS | Performed by: FAMILY MEDICINE

## 2024-10-16 PROCEDURE — 90662 IIV NO PRSV INCREASED AG IM: CPT | Performed by: FAMILY MEDICINE

## 2024-10-16 PROCEDURE — G0439 PPPS, SUBSEQ VISIT: HCPCS | Performed by: FAMILY MEDICINE

## 2024-10-16 PROCEDURE — 1170F FXNL STATUS ASSESSED: CPT | Performed by: FAMILY MEDICINE

## 2024-10-16 PROCEDURE — 1126F AMNT PAIN NOTED NONE PRSNT: CPT | Performed by: FAMILY MEDICINE

## 2024-10-16 PROCEDURE — 99213 OFFICE O/P EST LOW 20 MIN: CPT | Performed by: FAMILY MEDICINE

## 2024-10-16 PROCEDURE — 3074F SYST BP LT 130 MM HG: CPT | Performed by: FAMILY MEDICINE

## 2024-10-16 PROCEDURE — 1160F RVW MEDS BY RX/DR IN RCRD: CPT | Performed by: FAMILY MEDICINE

## 2024-10-16 PROCEDURE — 3078F DIAST BP <80 MM HG: CPT | Performed by: FAMILY MEDICINE

## 2024-10-16 NOTE — PROGRESS NOTES
Subjective   The ABCs of the Annual Wellness Visit  Medicare Wellness Visit      Alfreda Gruber is a 79 y.o. patient who presents for a Medicare Wellness Visit.    The following portions of the patient's history were reviewed and   updated as appropriate: allergies, current medications, past family history, past medical history, past social history, past surgical history, and problem list.    Compared to one year ago, the patient's physical   health is worse.  Compared to one year ago, the patient's mental   health is the same.    Recent Hospitalizations:  She was not admitted to the hospital during the last year.     Current Medical Providers:  Patient Care Team:  Chika Islas MD as PCP - General  Chemchirihenny, MD Gomez as Consulting Physician (Cardiology)  GEORGIA Hebert DPM as Consulting Physician (Podiatry)  Mikey Henao II, DO as Consulting Physician (Sports Medicine)  Mika Mahan OD (Optometry)    Outpatient Medications Prior to Visit   Medication Sig Dispense Refill    albuterol sulfate  (90 Base) MCG/ACT inhaler Inhale 2 puffs Every 4 (Four) Hours As Needed for Wheezing or Shortness of Air. 6.7 g 2    aspirin 81 MG EC tablet Take 1 tablet by mouth Daily.      atorvastatin (LIPITOR) 40 MG tablet TAKE 1 TABLET BY MOUTH DAILY 90 tablet 3    fluticasone (FLONASE) 50 MCG/ACT nasal spray Administer 2 sprays into the nostril(s) as directed by provider Daily As Needed for Rhinitis.      latanoprost (XALATAN) 0.005 % ophthalmic solution Administer 1 drop to both eyes Every Night.      metoprolol tartrate (LOPRESSOR) 25 MG tablet TAKE 1 TABLET BY MOUTH TWICE DAILY 180 tablet 2    Albuterol-Budesonide 90-80 MCG/ACT aerosol Inhale by inhalation route.       No facility-administered medications prior to visit.     No opioid medication identified on active medication list. I have reviewed chart for other potential  high risk medication/s and harmful drug interactions in  "the elderly.      Aspirin is on active medication list. Aspirin use is indicated based on review of current medical condition/s. Pros and cons of this therapy have been discussed today. Benefits of this medication outweigh potential harm.  Patient has been encouraged to continue taking this medication.  .      Patient Active Problem List   Diagnosis    Cardiomyopathy    Chronic coronary artery disease    Chronic obstructive pulmonary disease    Encounter for annual wellness exam in Medicare patient    Gastroesophageal reflux disease    Hearing loss    Hypertension    Osteoporosis    Other screening mammogram    Status post percutaneous transluminal coronary angioplasty    Tobacco use    Varicose veins of lower extremity    Dyslipidemia    Total, mature senile cataract    Primary open angle glaucoma    Presbyopia    Nuclear senile cataract    Posterior capsule opacification    Dizziness    Chronic radicular lumbar pain     Advance Care Planning Advance Directive is on file.  ACP discussion was held with the patient during this visit. Patient has an advance directive in EMR which is still valid.             Objective   Vitals:    10/16/24 1315   BP: 120/72   BP Location: Left arm   Patient Position: Sitting   Cuff Size: Adult   Pulse: 60   Temp: 97.9 °F (36.6 °C)   TempSrc: Temporal   SpO2: 96%   Weight: 64.9 kg (143 lb)   Height: 162.6 cm (64\")   PainSc: 0-No pain       Estimated body mass index is 24.55 kg/m² as calculated from the following:    Height as of this encounter: 162.6 cm (64\").    Weight as of this encounter: 64.9 kg (143 lb).            Does the patient have evidence of cognitive impairment? No   MMSE done 29/30                                                                                             Health  Risk Assessment    Smoking Status:  Social History     Tobacco Use   Smoking Status Every Day    Current packs/day: 0.50    Average packs/day: 0.5 packs/day for 51.8 years (25.9 ttl pk-yrs)    " Types: Cigarettes    Start date: 1973    Passive exposure: Past   Smokeless Tobacco Never     Alcohol Consumption:  Social History     Substance and Sexual Activity   Alcohol Use Never       Fall Risk Screen  STEADI Fall Risk Assessment was completed, and patient is at LOW risk for falls.Assessment completed on:10/16/2024    Depression Screening:      10/16/2024     1:17 PM   PHQ-2/PHQ-9 Depression Screening   Little interest or pleasure in doing things Not at all   Feeling down, depressed, or hopeless Not at all     Health Habits and Functional and Cognitive Screening:      10/16/2024     1:18 PM   Functional & Cognitive Status   Do you have difficulty preparing food and eating? No   Do you have difficulty bathing yourself, getting dressed or grooming yourself? No   Do you have difficulty using the toilet? No   Do you have difficulty moving around from place to place? No   Do you have trouble with steps or getting out of a bed or a chair? No   Current Diet Well Balanced Diet   Dental Exam Not up to date   Eye Exam Up to date        Eye Exam Comment 10/15/24 / [facility]   Exercise (times per week) 7 times per week   Current Exercises Include Other        Exercise Comment PT for neck   Do you need help using the phone?  No   Are you deaf or do you have serious difficulty hearing?  Yes   Do you need help to go to places out of walking distance? No   Do you need help shopping? No   Do you need help preparing meals?  No   Do you need help with housework?  No   Do you need help with laundry? No   Do you need help taking your medications? No   Do you need help managing money? No   Do you ever drive or ride in a car without wearing a seat belt? No   Have you felt unusual stress, anger or loneliness in the last month? No   Who do you live with? Spouse   If you need help, do you have trouble finding someone available to you? No   Have you been bothered in the last four weeks by sexual problems? No   Do you have  difficulty concentrating, remembering or making decisions? No           Age-appropriate Screening Schedule:  Refer to the list below for future screening recommendations based on patient's age, sex and/or medical conditions. Orders for these recommended tests are listed in the plan section. The patient has been provided with a written plan.    Health Maintenance List  Health Maintenance   Topic Date Due    ZOSTER VACCINE (1 of 2) Never done    RSV Vaccine - Adults (1 - 1-dose 75+ series) Never done    DXA SCAN  07/31/2019    INFLUENZA VACCINE  08/01/2024    COVID-19 Vaccine (6 - 2023-24 season) 09/01/2024    BMI FOLLOWUP  07/26/2025    ANNUAL WELLNESS VISIT  10/16/2025    TDAP/TD VACCINES (2 - Td or Tdap) 06/14/2031    Pneumococcal Vaccine 65+  Completed                                                                                                                                                CMS Preventative Services Quick Reference  Risk Factors Identified During Encounter  Immunizations Discussed/Encouraged: Influenza    The above risks/problems have been discussed with the patient.  Pertinent information has been shared with the patient in the After Visit Summary.  An After Visit Summary and PPPS were made available to the patient.    Follow Up:   Next Medicare Wellness visit to be scheduled in 1 year.         Additional E&M Note during same encounter follows:  Patient has additional, significant, and separately identifiable condition(s)/problem(s) that require work above and beyond the Medicare Wellness Visit     Chief Complaint  Medicare Wellness-subsequent    Subjective   HPI  Alfreda is also being seen today for additional medical problem: follow up on bp and chol  She feels well  She has no complaints   She wants a flu shot    Review of Systems   Constitutional: Negative.    Respiratory: Negative.     Cardiovascular: Negative.               Objective   Vital Signs:  /72 (BP Location: Left arm, Patient  "Position: Sitting, Cuff Size: Adult)   Pulse 60   Temp 97.9 °F (36.6 °C) (Temporal)   Ht 162.6 cm (64\")   Wt 64.9 kg (143 lb)   SpO2 96%   BMI 24.55 kg/m²   Physical Exam  Vitals and nursing note reviewed.   Constitutional:       Appearance: Normal appearance. She is normal weight.   Cardiovascular:      Rate and Rhythm: Normal rate and regular rhythm.      Heart sounds: Normal heart sounds.   Pulmonary:      Effort: Pulmonary effort is normal.      Breath sounds: Normal breath sounds.   Musculoskeletal:      Right lower leg: No edema.      Left lower leg: No edema.   Neurological:      Mental Status: She is alert and oriented to person, place, and time.   Psychiatric:         Mood and Affect: Mood normal.                 Assessment and Plan               Encounter for annual wellness exam in Medicare patient  Counseled on the need for smoking cessation  Dexa ordered  Primary hypertension  She will continue current meds  Cmp/lipid ordered    Dyslipidemia  She will continue atorvastatin  Cmp/lipid ordered    Need for immunization against influenza  Flu shot given  Osteoporosis, unspecified osteoporosis type, unspecified pathological fracture presence  DEXA ordered    Orders Placed This Encounter   Procedures    Fluzone High-Dose 65+yrs    Comprehensive Metabolic Panel     Standing Status:   Future     Order Specific Question:   Release to patient     Answer:   Routine Release [[id number]]    Lipid Panel     Standing Status:   Future     Standing Expiration Date:   10/16/2025     Order Specific Question:   Release to patient     Answer:   Routine Release [[id number]]             Follow Up   No follow-ups on file.  Patient was given instructions and counseling regarding her condition or for health maintenance advice. Please see specific information pulled into the AVS if appropriate.  "

## 2024-10-17 NOTE — PROGRESS NOTES
"10/16/2024  Foot and Ankle Surgery - Established Patient/Follow-up  Provider: Dr. Devyn Hebert DPM  Location: [facility]    Subjective:  Alfreda Gruber is a 79 y.o. female.     Chief Complaint   Patient presents with    Right Foot - Follow-up, Fracture     Closed displaced fx of 5th metatarsal bone     Follow-up     ELTON ADDISON MD  6/20/2024       History of Present Illness  The patient presents for evaluation of foot pain.    She reports significant improvement in her foot condition, estimating a 90% recovery. However, she experiences occasional discomfort in her little toe, particularly when wearing shoes and socks or walking barefoot after removing her shoes. She also mentions sporadic pain when bending her foot, leading her to question if the healing process is complete.      Allergies   Allergen Reactions    Penicillins Rash       Current Outpatient Medications on File Prior to Visit   Medication Sig Dispense Refill    albuterol sulfate  (90 Base) MCG/ACT inhaler Inhale 2 puffs Every 4 (Four) Hours As Needed for Wheezing or Shortness of Air. 6.7 g 2    aspirin 81 MG EC tablet Take 1 tablet by mouth Daily.      atorvastatin (LIPITOR) 40 MG tablet TAKE 1 TABLET BY MOUTH DAILY 90 tablet 3    fluticasone (FLONASE) 50 MCG/ACT nasal spray Administer 2 sprays into the nostril(s) as directed by provider Daily As Needed for Rhinitis.      latanoprost (XALATAN) 0.005 % ophthalmic solution Administer 1 drop to both eyes Every Night.      metoprolol tartrate (LOPRESSOR) 25 MG tablet TAKE 1 TABLET BY MOUTH TWICE DAILY 180 tablet 2     No current facility-administered medications on file prior to visit.       Objective   Resp 20   Ht 162.6 cm (64\")   Wt 66.2 kg (146 lb)   LMP  (LMP Unknown)   BMI 25.06 kg/m²     Foot/Ankle Exam  Physical Exam  GENERAL  Appearance:  appears stated age  Orientation:  AAOx3  Affect:  appropriate     VASCULAR      Right Foot Vascularity   Dorsalis pedis:  " 2+  Posterior tibial:  2+  Skin temperature:  warm  Edema gradin+  CFT:  < 3 seconds  Pedal hair growth:  Present  Varicosities:  none     NEUROLOGIC      Right Foot Neurologic   Light touch sensation: normal  Hot/Cold sensation: normal  Achilles reflex:  2+     MUSCULOSKELETAL      Right Foot Musculoskeletal   Ecchymosis:  lateral foot  Tenderness:  lateral foot tenderness    Arch:  Normal     DERMATOLOGIC       Right Foot Dermatologic   Skin  Right foot skin is intact.      Right foot additional comments: Discomfort with palpation to the fifth metatarsal region.  Range of motion, muscle strength, and instability testing deferred secondary to guarding.  No gross deformity.  No pain to the ankle or proximal lower extremity.     9/3/24: Less discomfort, swelling, and bruising.  Improved range of motion.  No significant pain with palpation involving the fracture site.    10/16/24: No significant pain or limitation involving the right foot.  No progressive deformity or instability.      Results  Imaging  Fracture site shows significant healing and bone consolidation.    Assessment & Plan   Diagnoses and all orders for this visit:    1. Foot pain, right (Primary)    2. Closed displaced fracture of fifth metatarsal bone of right foot with routine healing, subsequent encounter  -     XR Foot 3+ View Right      Assessment & Plan    The foot pain is likely due to stiffness in the ligaments and soft tissues surrounding the toe and joint, which is common post-injury. The fracture site is showing signs of healing and bone consolidation as expected. She was advised to maintain the range of motion in her foot by flexing the toe up and down, massaging it, and continuing with her regular footwear. No further intervention is required at this point unless her condition worsens. If her symptoms escalate, she should contact the office immediately for further evaluation.Reviewed proper basic stretching and manual therapy exercises  along with appropriate shoes and activity.  Discussed proper use and/or avoidance of OTC anti-inflammatories.  Patient is to call with any additional issues or concerns.  Greater than 20 minutes was spent before, during, and after evaluation for patient care.  Patient has opted to see me on an as-needed basis at this time.                 Patient or patient representative verbalized consent for the use of Ambient Listening during the visit with  GEORGIA Hebert DPM for chart documentation. 10/17/2024  07:15 EDT    GEORGIA Hebert DPM

## 2024-10-21 ENCOUNTER — LAB (OUTPATIENT)
Dept: FAMILY MEDICINE CLINIC | Facility: CLINIC | Age: 79
End: 2024-10-21
Payer: MEDICARE

## 2024-10-21 DIAGNOSIS — I10 PRIMARY HYPERTENSION: ICD-10-CM

## 2024-10-21 DIAGNOSIS — E83.51 HYPOCALCEMIA: ICD-10-CM

## 2024-10-21 DIAGNOSIS — E83.51 HYPOCALCEMIA: Primary | ICD-10-CM

## 2024-10-21 DIAGNOSIS — E78.5 DYSLIPIDEMIA: ICD-10-CM

## 2024-10-21 LAB
ALBUMIN SERPL-MCNC: 5.1 G/DL (ref 3.5–5.2)
ALBUMIN/GLOB SERPL: 5.7 G/DL
ALP SERPL-CCNC: 102 U/L (ref 39–117)
ALT SERPL W P-5'-P-CCNC: 14 U/L (ref 1–33)
ANION GAP SERPL CALCULATED.3IONS-SCNC: 2 MMOL/L (ref 5–15)
AST SERPL-CCNC: 17 U/L (ref 1–32)
BILIRUB SERPL-MCNC: 0.4 MG/DL (ref 0–1.2)
BUN SERPL-MCNC: 9 MG/DL (ref 8–23)
BUN/CREAT SERPL: 24.3 (ref 7–25)
CALCIUM SPEC-SCNC: 7.7 MG/DL (ref 8.6–10.5)
CHLORIDE SERPL-SCNC: 99 MMOL/L (ref 98–107)
CHOLEST SERPL-MCNC: 125 MG/DL (ref 0–200)
CO2 SERPL-SCNC: 37 MMOL/L (ref 22–29)
CREAT SERPL-MCNC: 0.37 MG/DL (ref 0.57–1)
EGFRCR SERPLBLD CKD-EPI 2021: 101.5 ML/MIN/1.73
GLOBULIN UR ELPH-MCNC: 0.9 GM/DL
GLUCOSE SERPL-MCNC: 91 MG/DL (ref 65–99)
HDLC SERPL-MCNC: 42 MG/DL (ref 40–60)
LDLC SERPL CALC-MCNC: 68 MG/DL (ref 0–100)
LDLC/HDLC SERPL: 1.64 {RATIO}
MAGNESIUM SERPL-MCNC: 2.1 MG/DL (ref 1.6–2.4)
POTASSIUM SERPL-SCNC: 4.4 MMOL/L (ref 3.5–5.2)
PROT SERPL-MCNC: 6 G/DL (ref 6–8.5)
SODIUM SERPL-SCNC: 138 MMOL/L (ref 136–145)
TRIGL SERPL-MCNC: 71 MG/DL (ref 0–150)
VLDLC SERPL-MCNC: 15 MG/DL (ref 5–40)

## 2024-10-21 PROCEDURE — 83735 ASSAY OF MAGNESIUM: CPT | Performed by: FAMILY MEDICINE

## 2024-10-21 PROCEDURE — 80061 LIPID PANEL: CPT | Performed by: FAMILY MEDICINE

## 2024-10-21 PROCEDURE — 80053 COMPREHEN METABOLIC PANEL: CPT | Performed by: FAMILY MEDICINE

## 2024-10-21 PROCEDURE — 36415 COLL VENOUS BLD VENIPUNCTURE: CPT

## 2024-10-22 NOTE — PROGRESS NOTES
Left message to return call to doctor's office at [facility]. Either to get test results or message from provider.

## 2024-10-23 ENCOUNTER — TELEPHONE (OUTPATIENT)
Dept: FAMILY MEDICINE CLINIC | Facility: CLINIC | Age: 79
End: 2024-10-23
Payer: MEDICARE

## 2024-10-23 DIAGNOSIS — E83.51 HYPOCALCEMIA: Primary | ICD-10-CM

## 2024-10-23 NOTE — TELEPHONE ENCOUNTER
Caller: Alfreda Gruber    Relationship: Self    Best call back number: [phone]     What is the best time to reach you: ANY    Who are you requesting to speak with (clinical staff, provider,  specific staff member): CLINICAL    Do you know the name of the person who called: ALFREDA    What was the call regarding: PATIENT WILL GET BLOOD WORK REDRAWN ON 10/31/24 WHEN SHE DOES IN FOR THE DEXA SCAN. PATIENT IS REQUESTING IT BE AVAILABLE IN THE SYSTEM THAT DATE.    Is it okay if the provider responds through [employer]:

## 2024-10-31 ENCOUNTER — HOSPITAL ENCOUNTER (OUTPATIENT)
Dept: BONE DENSITY | Facility: HOSPITAL | Age: 79
Discharge: HOME | End: 2024-10-31
Payer: MEDICARE

## 2024-10-31 ENCOUNTER — LAB (OUTPATIENT)
Dept: LAB | Facility: HOSPITAL | Age: 79
End: 2024-10-31
Payer: MEDICARE

## 2024-10-31 DIAGNOSIS — M81.0 OSTEOPOROSIS, UNSPECIFIED OSTEOPOROSIS TYPE, UNSPECIFIED PATHOLOGICAL FRACTURE PRESENCE: ICD-10-CM

## 2024-10-31 DIAGNOSIS — M81.0 OSTEOPOROSIS WITHOUT CURRENT PATHOLOGICAL FRACTURE, UNSPECIFIED OSTEOPOROSIS TYPE: Primary | ICD-10-CM

## 2024-10-31 DIAGNOSIS — E83.51 HYPOCALCEMIA: ICD-10-CM

## 2024-10-31 PROBLEM — E55.9 VITAMIN D DEFICIENCY: Status: ACTIVE | Noted: 2024-10-31

## 2024-10-31 LAB
25(OH)D3 SERPL-MCNC: 19.1 NG/ML (ref 30–100)
ANION GAP SERPL CALCULATED.3IONS-SCNC: 9.1 MMOL/L (ref 5–15)
BUN SERPL-MCNC: 14 MG/DL (ref 8–23)
BUN/CREAT SERPL: 17.1 (ref 7–25)
CALCIUM SPEC-SCNC: 9.9 MG/DL (ref 8.6–10.5)
CHLORIDE SERPL-SCNC: 99 MMOL/L (ref 98–107)
CO2 SERPL-SCNC: 28.9 MMOL/L (ref 22–29)
CREAT SERPL-MCNC: 0.82 MG/DL (ref 0.57–1)
EGFRCR SERPLBLD CKD-EPI 2021: 72 ML/MIN/1.73
GLUCOSE SERPL-MCNC: 101 MG/DL (ref 65–99)
POTASSIUM SERPL-SCNC: 4.4 MMOL/L (ref 3.5–5.2)
PTH-INTACT SERPL-MCNC: 42.8 PG/ML (ref 15–65)
SODIUM SERPL-SCNC: 137 MMOL/L (ref 136–145)

## 2024-10-31 PROCEDURE — 80048 BASIC METABOLIC PNL TOTAL CA: CPT

## 2024-10-31 PROCEDURE — 36415 COLL VENOUS BLD VENIPUNCTURE: CPT

## 2024-10-31 PROCEDURE — 83970 ASSAY OF PARATHORMONE: CPT

## 2024-10-31 PROCEDURE — 82306 VITAMIN D 25 HYDROXY: CPT

## 2024-10-31 PROCEDURE — 77080 DXA BONE DENSITY AXIAL: CPT

## 2024-11-01 NOTE — PROGRESS NOTES
Patient notified of test results. No questions. Patient verbalize understanding. Send Rx to [facility] in [place].

## 2024-11-03 DIAGNOSIS — E55.9 VITAMIN D DEFICIENCY: Primary | ICD-10-CM

## 2024-11-03 RX ORDER — ERGOCALCIFEROL 1.25 MG/1
50000 CAPSULE, LIQUID FILLED ORAL WEEKLY
Qty: 12 CAPSULE | Refills: 3 | Status: SHIPPED | OUTPATIENT
Start: 2024-11-03

## 2024-11-04 ENCOUNTER — DOCUMENTATION (OUTPATIENT)
Dept: PHYSICAL THERAPY | Facility: CLINIC | Age: 79
End: 2024-11-04
Payer: MEDICARE

## 2024-11-04 NOTE — PROGRESS NOTES
Physical Therapy Discharge Summary  [address] [address]    Patient: Alfreda Gruber   : 1945  Diagnosis/ICD-10 Code:  Pain, neck [M54.2]  Referring practitioner: GRADY Pacheco  Date of Initial Visit: Type: THERAPY  Noted: 2024  Today's Date: 10/1/2024  Patient seen for 14 sessions       Discharge Status of Patient: pt wanted to try I HEP and discussed that if we didn't hear from by end of POC (24) that she would be discharged.      Goals  Plan Goals: STGs in 4 weeks:  Decrease neck pain to 6/10 on average Met   Increase neck AROM 5-10 degrees where limited as much Partially Met   Pt will report 50% reduction in dizziness frequency and intensity Met   Assess UE strength as tolerated Met      LTGs by discharge  Increase cerv AROM to WFL/WNL Progressing   Increase UE strength to 4+/5 or better Partially Met   Pt will be able to turn her head to the R for ADLs without difficulty or pain Progressing   Pt will be able to lift/carry laundry baskets, garbage/grocery bags, and/or pots/pans without difficulty or pain (as able based on use of RW/walking boot due to fx) Partially Met  - being careful still due to her foot fx  healing  Pt will be able to sleep without waking from pain most nights Progressing         Discharge Plan: Continue with current home exercise program as instructed     Comments: Pt was given HEP during sessions.      Date of Discharge: 24            Shannan Welsh, PT  Physical Therapist  [place] License: [id number]

## 2024-11-12 ENCOUNTER — OFFICE VISIT (OUTPATIENT)
Dept: CARDIOLOGY | Facility: CLINIC | Age: 79
End: 2024-11-12
Payer: MEDICARE

## 2024-11-12 VITALS
SYSTOLIC BLOOD PRESSURE: 150 MMHG | HEART RATE: 81 BPM | HEIGHT: 64 IN | DIASTOLIC BLOOD PRESSURE: 82 MMHG | BODY MASS INDEX: 24.17 KG/M2 | OXYGEN SATURATION: 98 % | WEIGHT: 141.6 LBS

## 2024-11-12 DIAGNOSIS — I10 PRIMARY HYPERTENSION: ICD-10-CM

## 2024-11-12 DIAGNOSIS — J41.0 SIMPLE CHRONIC BRONCHITIS: ICD-10-CM

## 2024-11-12 DIAGNOSIS — Z72.0 TOBACCO USE: ICD-10-CM

## 2024-11-12 DIAGNOSIS — I25.10 CHRONIC CORONARY ARTERY DISEASE: Primary | ICD-10-CM

## 2024-11-12 PROCEDURE — 99212 OFFICE O/P EST SF 10 MIN: CPT | Performed by: INTERNAL MEDICINE

## 2024-11-12 PROCEDURE — 3079F DIAST BP 80-89 MM HG: CPT | Performed by: INTERNAL MEDICINE

## 2024-11-12 PROCEDURE — 1159F MED LIST DOCD IN RCRD: CPT | Performed by: INTERNAL MEDICINE

## 2024-11-12 PROCEDURE — 1160F RVW MEDS BY RX/DR IN RCRD: CPT | Performed by: INTERNAL MEDICINE

## 2024-11-12 PROCEDURE — 3077F SYST BP >= 140 MM HG: CPT | Performed by: INTERNAL MEDICINE

## 2025-03-04 ENCOUNTER — OFFICE VISIT (OUTPATIENT)
Dept: ENDOCRINOLOGY | Facility: CLINIC | Age: 80
End: 2025-03-04
Payer: MEDICARE

## 2025-03-04 VITALS
HEART RATE: 60 BPM | BODY MASS INDEX: 24.07 KG/M2 | WEIGHT: 141 LBS | DIASTOLIC BLOOD PRESSURE: 78 MMHG | SYSTOLIC BLOOD PRESSURE: 122 MMHG | OXYGEN SATURATION: 97 % | HEIGHT: 64 IN

## 2025-03-04 DIAGNOSIS — E55.9 VITAMIN D DEFICIENCY: ICD-10-CM

## 2025-03-04 DIAGNOSIS — M81.0 AGE-RELATED OSTEOPOROSIS WITHOUT CURRENT PATHOLOGICAL FRACTURE: Primary | ICD-10-CM

## 2025-03-04 RX ORDER — ABALOPARATIDE 2000 UG/ML
80 INJECTION, SOLUTION SUBCUTANEOUS DAILY
Qty: 1.56 ML | Refills: 11 | Status: SHIPPED | OUTPATIENT
Start: 2025-03-04

## 2025-03-04 NOTE — PROGRESS NOTES
-----------------------------------------------------------------  ENDOCRINE CLINIC NOTE  -----------------------------------------------------------------        PATIENT NAME: Alfreda Gruber  PATIENT : 1945 AGE: 79 y.o.  MRN NUMBER: [id number]  PRIMARY CARE: Chika Islas MD    ==========================================================================    CHIEF COMPLAINT: Osteoporosis  DATE OF SERVICE: 25    ==========================================================================    HPI / SUBJECTIVE    79 y.o. female is seen in the clinic today for osteoporosis.    - Osteoporosis diagnosed in: 2017  - Previous and current treatments: None     - Last DXA scan: 10/31/2024     - History of fractures: (Traumatic vs Nontraumatic) Hx of Foot Fracture with minimal trauma around 6 months ago  - Physical activity: Independent for ADL's and I-ADL's  - Exercise: Not really  - History of falls: None    - Menopause: 40  - Any kids: 4  - Use of hormone replacement therapy: None    - History of kidney stones: None  - Smoking history:Active smoker  - History of chronic steroid use: None  - Hx of thyroid problems: None    - Hx of radiation therapy if considering anabolic: None    - Loss of height: +ve per pt around 2 inches  - History of renal insufficiency: -ve  - Active dental/jaw problems: Dentures in place  - History of reflux (GERD) symptoms: +ve     - Calcium intake: None   * Milk: None   * Yogurt: None   * Cheese: 3-5 times a week    - Vitamin D supplementation or sun exposure: 50,000 units recently started by PCP    ==========================================================================                                                PAST MEDICAL HISTORY    Past Medical History:   Diagnosis Date    Acute coronary syndrome     Arthritis     Bursitis     rt. hip    CAD (coronary artery disease) 2015    Cardiomyopathy     Cataract     COPD (chronic obstructive pulmonary disease)      COVID 07/2022    Dyspnea     GERD (gastroesophageal reflux disease)     Glaucoma     Hx of non-ST elevation myocardial infarction (NSTEMI) 2015    Hyperlipidemia     Osteoporosis        ==========================================================================    PAST SURGICAL HISTORY    Past Surgical History:   Procedure Laterality Date    CARDIAC SURGERY  02/27/2015    Heart Cath    CATARACT EXTRACTION      CORONARY ANGIOPLASTY WITH STENT PLACEMENT  02/27/2015    STACEY: proximal RCA    TUMOR REMOVAL      from stomach       ==========================================================================    FAMILY HISTORY    Family History   Problem Relation Age of Onset    Hypertension Mother     Breast cancer Mother     Lung cancer Father     Heart disease Sister     Diabetes Sister     Stroke Sister     Heart attack Sister     Diabetes Paternal Grandfather        ==========================================================================    SOCIAL HISTORY    Social History     Socioeconomic History    Marital status:    Tobacco Use    Smoking status: Every Day     Current packs/day: 0.50     Average packs/day: 0.5 packs/day for 52.2 years (26.1 ttl pk-yrs)     Types: Cigarettes     Start date: 1973     Passive exposure: Past    Smokeless tobacco: Never   Vaping Use    Vaping status: Never Used   Substance and Sexual Activity    Alcohol use: Never    Drug use: Never    Sexual activity: Defer       ==========================================================================    MEDICATIONS      Current Outpatient Medications:     albuterol sulfate  (90 Base) MCG/ACT inhaler, Inhale 2 puffs Every 4 (Four) Hours As Needed for Wheezing or Shortness of Air., Disp: 6.7 g, Rfl: 2    aspirin 81 MG EC tablet, Take 1 tablet by mouth Daily., Disp: , Rfl:     atorvastatin (LIPITOR) 40 MG tablet, TAKE 1 TABLET BY MOUTH DAILY, Disp: 90 tablet, Rfl: 3    fluticasone (FLONASE) 50 MCG/ACT nasal spray, Administer 2 sprays  "into the nostril(s) as directed by provider Daily As Needed for Rhinitis., Disp: , Rfl:     latanoprost (XALATAN) 0.005 % ophthalmic solution, Administer 1 drop to both eyes Every Night., Disp: , Rfl:     metoprolol tartrate (LOPRESSOR) 25 MG tablet, TAKE 1 TABLET BY MOUTH TWICE DAILY, Disp: 180 tablet, Rfl: 2    vitamin D (ERGOCALCIFEROL) 1.25 MG (51513 UT) capsule capsule, Take 1 capsule by mouth 1 (One) Time Per Week., Disp: 12 capsule, Rfl: 3    ==========================================================================    ALLERGIES    Allergies   Allergen Reactions    Penicillins Rash       ==========================================================================    OBJECTIVE    Vitals:    03/04/25 1333   BP: 122/78   Pulse: 60   SpO2: 97%     Body mass index is 24.2 kg/m².     General: Alert, cooperative, no acute distress  Thyroid:  no enlargement/tenderness/palpable nodules  Lungs: Clear to auscultation bilaterally, respirations unlabored  Heart: Regular rate and rhythm, S1 and S2 normal, no murmur, rub or gallop  Abdomen: Soft, NT, ND and Bowel sounds Positive  Extremities:  Extremities normal, atraumatic, no cyanosis or edema    ==========================================================================    LAB EVALUATION    Lab Results   Component Value Date    GLUCOSE 101 (H) 10/31/2024    BUN 14 10/31/2024    CREATININE 0.82 10/31/2024    EGFRIFNONA 66 10/11/2021    BCR 17.1 10/31/2024    K 4.4 10/31/2024    CO2 28.9 10/31/2024    CALCIUM 9.9 10/31/2024    ALBUMIN 5.1 10/21/2024    AST 17 10/21/2024    ALT 14 10/21/2024    CHOL 125 10/21/2024    TRIG 71 10/21/2024    HDL 42 10/21/2024    LDL 68 10/21/2024     No results found for: \"HGBA1C\"  Lab Results   Component Value Date    CREATININE 0.82 10/31/2024     No results found for: \"TSH\", \"FREET4\", \"THYROIDAB\"    DEXA BONE DENSITY AXIAL    10/31/2024     The T-score in the lumbar spine is -2.9.  The T-score in the femoral neck is -2.1.  The T-score in the " total hip is -1.5.    ==========================================================================    ASSESSMENT AND PLAN    # Osteoporosis without pathological fracture  # Vitamin D deficiency    - Discussed with patient in detail about pathophysiology of osteoporosis  - Patient last DEXA scan showed lowest T-score of -2.9 with history of bone fracture  - Also patient reports to have lost around 2 inches of height  - Given the history of foot fracture with minimal trauma and loss of height patient is a candidate for anabolic therapy  - Different therapeutic options discussed with patient in detail and patient wants to pursue Tymlos therapy  - Will plan for Tymlos/blood pressure type therapy for 18 months and then we will pursue Prolia therapy every 6 months  - Benefit and side effect of all therapies were discussed with patient in detail to visualize understanding  - Patient to start taking calcium supplementation 600 mg once a day while continuing 50,000 units of vitamin D once a week  - Will plan for repeat clinical follow-up in 3 months time    Thank you for courtesy of consultation.    Return to clinic: 3 months    Entire assessment and plan was discussed and counseled the patient in detail to which patient verbalized understanding and agreed with care.  Answered all queries and concerns.    Part of this note may be an electronic transcription/translation of spoken language to printed text using the Dragon Dictation System.     Note: Portions of this note may have been copied from previous notes but documentation have been reviewed and edited as necessary to support clinical decision making for today's visit.    ==========================================================================    INFORMATION PROVIDED TO PATIENT    Patient Instructions   Please,    - Get blood work done today.  - Start taking calcium supplement 600 mg once a day with meals over-the-counter.  - Continue vitamin D 50,000 units every  week.    - Start abaloparatide/Tymlos 80 mcg daily.    Plan for follow-up in 3 months time.    Thank you for your visit today.    If you have any questions or concerns please feel free to reach out of the office.       ==========================================================================  Alejandro Walton MD  Department of Endocrine, Diabetes and Metabolism  [place], [place]  ==========================================================================

## 2025-03-04 NOTE — PATIENT INSTRUCTIONS
Please,    - Get blood work done today.  - Start taking calcium supplement 600 mg once a day with meals over-the-counter.  - Continue vitamin D 50,000 units every week.    - Start abaloparatide/Tymlos 80 mcg daily.    Plan for follow-up in 3 months time.    Thank you for your visit today.    If you have any questions or concerns please feel free to reach out of the office.

## 2025-03-05 ENCOUNTER — SPECIALTY PHARMACY (OUTPATIENT)
Dept: ENDOCRINOLOGY | Facility: CLINIC | Age: 80
End: 2025-03-05
Payer: MEDICARE

## 2025-03-05 LAB
25(OH)D3+25(OH)D2 SERPL-MCNC: 70.3 NG/ML (ref 30–100)
ALBUMIN SERPL-MCNC: 4.5 G/DL (ref 3.7–4.7)
ALP SERPL-CCNC: 141 IU/L (ref 44–121)
ALT SERPL-CCNC: 15 IU/L (ref 0–32)
AST SERPL-CCNC: 18 IU/L (ref 0–40)
BILIRUB SERPL-MCNC: 0.6 MG/DL (ref 0–1.2)
BUN SERPL-MCNC: 19 MG/DL (ref 8–27)
BUN/CREAT SERPL: 19 (ref 12–28)
CALCIUM SERPL-MCNC: 10 MG/DL (ref 8.7–10.3)
CHLORIDE SERPL-SCNC: 100 MMOL/L (ref 96–106)
CO2 SERPL-SCNC: 27 MMOL/L (ref 20–29)
CREAT SERPL-MCNC: 0.98 MG/DL (ref 0.57–1)
EGFRCR SERPLBLD CKD-EPI 2021: 58 ML/MIN/1.73
GLOBULIN SER CALC-MCNC: 2.4 G/DL (ref 1.5–4.5)
GLUCOSE SERPL-MCNC: 91 MG/DL (ref 70–99)
POTASSIUM SERPL-SCNC: 4.7 MMOL/L (ref 3.5–5.2)
PROT SERPL-MCNC: 6.9 G/DL (ref 6–8.5)
SODIUM SERPL-SCNC: 140 MMOL/L (ref 134–144)

## 2025-03-05 NOTE — PROGRESS NOTES
Specialty Pharmacy Patient Management Program  Prior Authorization Approval     Prior Authorization for Tymlos was Approved    Approval Start Date: 3/5/2025  Approval End Date: 12/31/2025  Authorization / Reference / Case Number: [id number]    CoverMyMeds Key: [id number]      Srinivasa Wu, PharmD, MPH  Clinical Specialty Pharmacist  3/5/2025  13:38 EST

## 2025-03-05 NOTE — PROGRESS NOTES
Specialty Pharmacy Patient Management Program  Endocrinology Benefits Investigation      Alfreda is seen by a [facility] Endocrinology provider and is currently taking a target specialty medication.  The patient is ELIGIBLE for enrollment in the Endocrinology Patient Management Program offered by [facility] if interested in services and target specialty medication is continued.     Benefit Investigation  As of 3/5/2025 13:40 EST  Insurance: [employer]  Medication(s) and Costs: Tymlos $936.71/30d    Education Provided for Specialty Medication  The patient has been provided with the following education and any applicable administration techniques (i.e. self-injection) have been demonstrated for the therapies indicated. All questions and concerns have been addressed prior to the patient receiving the medication, and the patient has verbalized understanding of the education and any materials provided.  Additional patient education shall be provided and documented upon request by the patient, provider or payer.          TYMLOS® (abaloparatide)  Medication Expectations   Why am I taking this medication? You are taking this medication to help prevent bone fractures because you have osteoporosis.    What should I expect while on this medication? You should expect to see your bone mineral density increase over a period of 18 to 24 months. During this time, you should expect to get regular lab work as directed by your doctor to monitor your progress.    How does the medication work? Osteoporosis can reduce your bone density, increasing your risk for fractures. Tymlos can help to increase bone density by mimicking another hormone in your body called parathyroid hormone (PTH). This helps your bone-building cells (osteoblasts) work more effectively, and increases the amount of calcium your body absorbs.   How long will I be on this medication for? The amount of time you will be on this medication will  be determined by your doctor based on your lab results.  Generally, you will not be on this medication for more than two years. After you stop Tymlos, you may be switched to another medication.   How do I take this medication? Take as directed on your prescription label. Tymlos is generally injected under the skin once daily, into the thigh or abdomen (stomach).  Tymlos can be taken with or without food. You should administer your daily dose immediately after taking the medication out of the refrigerator.    What are some possible side effects? The most common side effects include increased calcium in the body (hypercalcemia) and nausea. You may also experience dizziness, faintness upon standing (orthostatic hypotension), or joint pain.  Your doctor will monitor your calcium levels through regular lab work. Talk with your doctor if you notice these or other side effects that do not go away or get better with time.      What happens if I miss a dose? If you miss a dose, take it as soon as you remember. If it is close to your next dose, skip it (do not take 2 doses at once).      Medication Safety   What are things I should warn my doctor immediately about? Tell your doctor if you have an autoimmune disease, kidney failure, or you are taking blood thinners (such as warfarin) or steroids. Talk to your doctor if you are pregnant, planning to become pregnant, or breastfeeding. Also tell your doctor if you notice any signs/symptoms of an allergic reaction (rash, hives, difficulty breathing, etc.) or blisters on your skin.     What are things that I should be aware of? Be cautious of any side effects from this medication. Talk to your doctor if any new ones develop or aren't getting better.  You should give your first dose in a place where you can lie or sit down, in case you feel dizzy or faint. If this occurs, it should subside within a few minutes to hours.    What are some medications that can interact with this one?  There are no currently known medication interactions that would be serious.  However, this does not mean that medication interactions cannot happen.  Always tell your doctor or pharmacist immediately if you start taking any new medications, including over-the-counter medications, vitamins, and herbal supplements.       Medication Storage/Handling   How should I handle this medication? Keep this medication out of reach of pets/children in the original container. Do not remove medication from the injector pen.  Use caution when administering medication as needles are required with use.     How does this medication need to be stored? Store in the refrigerator until ready to use. It is important to avoid freezing the medication. May be stored at room temp after first dose.    How should I dispose of this medication? Discard pen 30 days after first injection, even if it still contains some unused solution. Do not use if solution is cloudy, colored, or contains solid particles. Discard medication if frozen.  If you need to discard the medication, you may be able to take to your local police station for proper disposal or find take-back bins at pharmacies.      Resources/Support   How can I remind myself to take this medication? You can download reminder apps to help you manage your refills. You may also set an alarm on your phone to remind you to take your daily dose of Tymlos.   Is financial support available?  [employer] can provide co-pay cards if you have commercial insurance or patient assistance if you have Medicare or no insurance.    Which vaccines are recommended for me? Talk to your doctor about these vaccines: Flu, Coronavirus (COVID-19), Pneumococcal (pneumonia), Tdap, Hepatitis B, Zoster (shingles)          Changes to Therapy Plan Discussed with Patient  Medication Therapy Changes by Provider Pt started on Tymlos 80mcg daily.  PA required and approved, but cost still high.  Pt to consider options at this time.      Additional Plans, Therapy Recommendations, or Therapy Problems to Be Addressed: None       Srinivasa Wu, PharmD, MPH  Clinical Specialty Pharmacist, Endocrinology  3/5/2025  13:40 EST

## 2025-03-10 RX ORDER — TERIPARATIDE 250 UG/ML
20 INJECTION, SOLUTION SUBCUTANEOUS DAILY
Qty: 2.4 ML | Refills: 5 | Status: SHIPPED | OUTPATIENT
Start: 2025-03-10 | End: 2025-03-14

## 2025-03-14 ENCOUNTER — TELEPHONE (OUTPATIENT)
Dept: ENDOCRINOLOGY | Facility: CLINIC | Age: 80
End: 2025-03-14
Payer: MEDICARE

## 2025-03-14 RX ORDER — ALENDRONATE SODIUM 70 MG/1
70 TABLET ORAL
Qty: 4 TABLET | Refills: 11 | Status: SHIPPED | OUTPATIENT
Start: 2025-03-14 | End: 2025-03-17

## 2025-03-14 NOTE — TELEPHONE ENCOUNTER
Care update:    - Patient is an ideal candidate for anabolic therapy but due to restrictive cost issues patient is not able to afford any medication and therefore she just want to try Fosamax therapy.  - Patient understands all the risk and benefit of the therapy and therefore ordering Fosamax therapy now      Alejandro Walton MD  16:57 EDT  03/14/25

## 2025-03-17 RX ORDER — ALENDRONATE SODIUM 70 MG/1
70 TABLET ORAL
Qty: 12 TABLET | Refills: 1 | Status: SHIPPED | OUTPATIENT
Start: 2025-03-17

## 2025-04-23 RX ORDER — METOPROLOL TARTRATE 25 MG/1
25 TABLET, FILM COATED ORAL 2 TIMES DAILY
Qty: 180 TABLET | Refills: 1 | Status: SHIPPED | OUTPATIENT
Start: 2025-04-23

## 2025-04-23 NOTE — TELEPHONE ENCOUNTER
Rx Refill Note  Requested Prescriptions     Pending Prescriptions Disp Refills    metoprolol tartrate (LOPRESSOR) 25 MG tablet [Pharmacy Med Name: METOPROLOL TARTRATE 25MG TABLETS] 180 tablet 2     Sig: TAKE 1 TABLET BY MOUTH TWICE DAILY      Last office visit with prescribing clinician: 11/12/2024   Last telemedicine visit with prescribing clinician: Visit date not found   Next office visit with prescribing clinician: 7/22/2025                         Would you like a call back once the refill request has been completed: [] Yes [] No    If the office needs to give you a call back, can they leave a voicemail: [] Yes [] No    Tasia Espinosa MA  04/23/25, 10:07 EDT

## 2025-06-27 ENCOUNTER — OFFICE VISIT (OUTPATIENT)
Dept: ENDOCRINOLOGY | Facility: CLINIC | Age: 80
End: 2025-06-27
Payer: MEDICARE

## 2025-06-27 VITALS
SYSTOLIC BLOOD PRESSURE: 120 MMHG | OXYGEN SATURATION: 94 % | DIASTOLIC BLOOD PRESSURE: 75 MMHG | WEIGHT: 133 LBS | HEIGHT: 64 IN | HEART RATE: 60 BPM | BODY MASS INDEX: 22.71 KG/M2

## 2025-06-27 DIAGNOSIS — M81.0 AGE-RELATED OSTEOPOROSIS WITHOUT CURRENT PATHOLOGICAL FRACTURE: Primary | ICD-10-CM

## 2025-06-27 DIAGNOSIS — E55.9 VITAMIN D DEFICIENCY: ICD-10-CM

## 2025-06-27 PROCEDURE — 1160F RVW MEDS BY RX/DR IN RCRD: CPT | Performed by: INTERNAL MEDICINE

## 2025-06-27 PROCEDURE — 1159F MED LIST DOCD IN RCRD: CPT | Performed by: INTERNAL MEDICINE

## 2025-06-27 PROCEDURE — 3078F DIAST BP <80 MM HG: CPT | Performed by: INTERNAL MEDICINE

## 2025-06-27 PROCEDURE — 99214 OFFICE O/P EST MOD 30 MIN: CPT | Performed by: INTERNAL MEDICINE

## 2025-06-27 PROCEDURE — 3074F SYST BP LT 130 MM HG: CPT | Performed by: INTERNAL MEDICINE

## 2025-06-27 RX ORDER — ALENDRONATE SODIUM 70 MG/1
70 TABLET ORAL
Qty: 13 TABLET | Refills: 3 | Status: SHIPPED | OUTPATIENT
Start: 2025-06-27

## 2025-06-27 NOTE — PROGRESS NOTES
-----------------------------------------------------------------  ENDOCRINE CLINIC NOTE  -----------------------------------------------------------------        PATIENT NAME: Alfreda Gruber  PATIENT : 1945 AGE: 79 y.o.  MRN NUMBER: [id number]  PRIMARY CARE: Chika Islas MD    ==========================================================================    CHIEF COMPLAINT: Osteoporosis  DATE OF SERVICE: 25    ==========================================================================    HPI / SUBJECTIVE    79 y.o. female is seen in the clinic today for osteoporosis.  Osteoporosis diagnosed in: 2017  Previous and current treatments:   Fosamax - 2025 -during initial visit anabolic therapy was ordered but patient is not able to afford and there is significant financial strain and only medication she is able to afford is Fosamax  Last DXA scan: 10/31/2024  History of fractures: (Traumatic vs Nontraumatic) Hx of Foot Fracture with minimal trauma around 6 months ago but no recent fractures since the last visit  Independent for ADLs and IADLs  No reported recent falls as well  Menopause: 40 and patient had 4 biological kids  Never been on hormone replacement therapy  No history of kidney stone  Patient is an active smoker  Currently taking calcium supplement and 50,000 vitamin D    ==========================================================================                                                PAST MEDICAL HISTORY    Past Medical History:   Diagnosis Date    Acute coronary syndrome     Arthritis     Bursitis     rt. hip    CAD (coronary artery disease) 2015    Cardiomyopathy     Cataract     COPD (chronic obstructive pulmonary disease)     COVID 2022    Dyspnea     GERD (gastroesophageal reflux disease)     Glaucoma     Hx of non-ST elevation myocardial infarction (NSTEMI)     Hyperlipidemia     Osteoporosis         ==========================================================================    PAST SURGICAL HISTORY    Past Surgical History:   Procedure Laterality Date    CARDIAC SURGERY  02/27/2015    Heart Cath    CATARACT EXTRACTION      CORONARY ANGIOPLASTY WITH STENT PLACEMENT  02/27/2015    STACEY: proximal RCA    TUMOR REMOVAL      from stomach       ==========================================================================    FAMILY HISTORY    Family History   Problem Relation Age of Onset    Hypertension Mother     Breast cancer Mother     Lung cancer Father     Heart disease Sister     Diabetes Sister     Stroke Sister     Heart attack Sister     Diabetes Paternal Grandfather        ==========================================================================    SOCIAL HISTORY    Social History     Socioeconomic History    Marital status:    Tobacco Use    Smoking status: Every Day     Current packs/day: 0.50     Average packs/day: 0.5 packs/day for 52.5 years (26.2 ttl pk-yrs)     Types: Cigarettes     Start date: 1973     Passive exposure: Past    Smokeless tobacco: Never   Vaping Use    Vaping status: Never Used   Substance and Sexual Activity    Alcohol use: Never    Drug use: Never    Sexual activity: Defer       ==========================================================================    MEDICATIONS      Current Outpatient Medications:     albuterol sulfate  (90 Base) MCG/ACT inhaler, Inhale 2 puffs Every 4 (Four) Hours As Needed for Wheezing or Shortness of Air., Disp: 6.7 g, Rfl: 2    alendronate (FOSAMAX) 70 MG tablet, Take 1 tablet by mouth Every 7 (Seven) Days., Disp: 13 tablet, Rfl: 3    aspirin 81 MG EC tablet, Take 1 tablet by mouth Daily., Disp: , Rfl:     atorvastatin (LIPITOR) 40 MG tablet, TAKE 1 TABLET BY MOUTH DAILY, Disp: 90 tablet, Rfl: 3    fluticasone (FLONASE) 50 MCG/ACT nasal spray, Administer 2 sprays into the nostril(s) as directed by provider Daily As Needed for Rhinitis.,  "Disp: , Rfl:     latanoprost (XALATAN) 0.005 % ophthalmic solution, Administer 1 drop to both eyes Every Night., Disp: , Rfl:     metoprolol tartrate (LOPRESSOR) 25 MG tablet, TAKE 1 TABLET BY MOUTH TWICE DAILY, Disp: 180 tablet, Rfl: 1    ==========================================================================    ALLERGIES    Allergies   Allergen Reactions    Penicillins Rash       ==========================================================================    OBJECTIVE    Vitals:    06/27/25 1259   BP: 120/75   Pulse: 60   SpO2: 94%     Body mass index is 22.83 kg/m².     General: Alert, cooperative, no acute distress  Thyroid:  no enlargement/tenderness/palpable nodules  Lungs: Clear to auscultation bilaterally, respirations unlabored  Heart: Regular rate and rhythm, S1 and S2 normal, no murmur, rub or gallop  Abdomen: Soft, NT, ND and Bowel sounds Positive  Extremities:  Extremities normal, atraumatic, no cyanosis or edema    ==========================================================================    LAB EVALUATION    Lab Results   Component Value Date    GLUCOSE 91 03/04/2025    BUN 19 03/04/2025    CREATININE 0.98 03/04/2025    EGFRIFNONA 66 10/11/2021    BCR 19 03/04/2025    K 4.7 03/04/2025    CO2 27 03/04/2025    CALCIUM 10.0 03/04/2025    ALBUMIN 4.5 03/04/2025    AST 18 03/04/2025    ALT 15 03/04/2025    CHOL 125 10/21/2024    TRIG 71 10/21/2024    HDL 42 10/21/2024    LDL 68 10/21/2024     No results found for: \"HGBA1C\"  Lab Results   Component Value Date    CREATININE 0.98 03/04/2025     No results found for: \"TSH\", \"FREET4\", \"THYROIDAB\"    ==========================================================================    DEXA BONE DENSITY AXIAL    10/31/2024     The T-score in the lumbar spine is -2.9.  The T-score in the femoral neck is -2.1.  The T-score in the total hip is -1.5.    ==========================================================================    ASSESSMENT AND PLAN    # Osteoporosis " without pathological fracture  # Vitamin D deficiency, counseled patient to stop taking 50,000 unit once a week and patient can take calcium supplement over-the-counter 400 to 1000 units a day    - Patient to continue Fosamax therapy  - Repeat blood work and clinical follow-up in 1 year time  - Will plan for repeat DEXA scan around March 2027, which will be 2 years after therapy    Return to clinic: 12 months    Entire assessment and plan was discussed and counseled the patient in detail to which patient verbalized understanding and agreed with care.  Answered all queries and concerns.    Part of this note may be an electronic transcription/translation of spoken language to printed text using the Dragon Dictation System.     Note: Portions of this note may have been copied from previous notes but documentation have been reviewed and edited as necessary to support clinical decision making for today's visit.    ==========================================================================    INFORMATION PROVIDED TO PATIENT    Patient Instructions   Please     - Continue taking Fosamax therapy 70 mg 1 pill by mouth once a week.  - Take it on empty stomach with at least 1-2 full glasses of water.  - Avoid taking it with any other flavored water, milk or any other drink including coffee.  - Swallow the whole tablet without chewing breaking or crushing.  - Do not lie down for at least 60 minutes after taking this drug or until you eat first food of the day.  - Continue to take calcium supplement along with vitamin D together but stopped taking 50,000 units once a week    Repeat blood work and clinical follow-up in 12 months.    Thank you for your visit today.    If you have any questions or concerns please feel free to reach out of the office.       ==========================================================================  Alejandro Walton MD  Department of Endocrine, Diabetes and Metabolism  [facility]  [place]  ==========================================================================

## 2025-06-27 NOTE — PATIENT INSTRUCTIONS
Please     - Continue taking Fosamax therapy 70 mg 1 pill by mouth once a week.  - Take it on empty stomach with at least 1-2 full glasses of water.  - Avoid taking it with any other flavored water, milk or any other drink including coffee.  - Swallow the whole tablet without chewing breaking or crushing.  - Do not lie down for at least 60 minutes after taking this drug or until you eat first food of the day.  - Continue to take calcium supplement along with vitamin D together but stopped taking 50,000 units once a week    Repeat blood work and clinical follow-up in 12 months.    Thank you for your visit today.    If you have any questions or concerns please feel free to reach out of the office.

## 2025-07-22 ENCOUNTER — OFFICE VISIT (OUTPATIENT)
Dept: CARDIOLOGY | Facility: CLINIC | Age: 80
End: 2025-07-22
Payer: MEDICARE

## 2025-07-22 VITALS
DIASTOLIC BLOOD PRESSURE: 66 MMHG | OXYGEN SATURATION: 99 % | WEIGHT: 132 LBS | SYSTOLIC BLOOD PRESSURE: 144 MMHG | HEART RATE: 50 BPM | HEIGHT: 64 IN | BODY MASS INDEX: 22.53 KG/M2

## 2025-07-22 DIAGNOSIS — I25.10 CHRONIC CORONARY ARTERY DISEASE: Primary | ICD-10-CM

## 2025-07-22 DIAGNOSIS — I10 PRIMARY HYPERTENSION: ICD-10-CM

## 2025-07-22 PROCEDURE — 99213 OFFICE O/P EST LOW 20 MIN: CPT | Performed by: INTERNAL MEDICINE

## 2025-07-22 PROCEDURE — 3077F SYST BP >= 140 MM HG: CPT | Performed by: INTERNAL MEDICINE

## 2025-07-22 PROCEDURE — 1160F RVW MEDS BY RX/DR IN RCRD: CPT | Performed by: INTERNAL MEDICINE

## 2025-07-22 PROCEDURE — 3078F DIAST BP <80 MM HG: CPT | Performed by: INTERNAL MEDICINE

## 2025-07-22 PROCEDURE — 1159F MED LIST DOCD IN RCRD: CPT | Performed by: INTERNAL MEDICINE

## 2025-07-22 RX ORDER — METOPROLOL TARTRATE 25 MG/1
25 TABLET, FILM COATED ORAL 2 TIMES DAILY
Qty: 180 TABLET | Refills: 3 | Status: SHIPPED | OUTPATIENT
Start: 2025-07-22

## 2025-07-22 RX ORDER — ASPIRIN 81 MG/1
81 TABLET ORAL DAILY
Qty: 30 TABLET | Refills: 11 | Status: SHIPPED | OUTPATIENT
Start: 2025-07-22

## 2025-07-22 RX ORDER — ATORVASTATIN CALCIUM 40 MG/1
40 TABLET, FILM COATED ORAL DAILY
Qty: 90 TABLET | Refills: 3 | Status: SHIPPED | OUTPATIENT
Start: 2025-07-22

## 2025-07-22 NOTE — PROGRESS NOTES
Progress note      Name: Alfreda Gruber ADMIT: (Not on file)   : 1945  PCP: Chika Islas MD    MRN: [id number] LOS: 0 days   AGE/SEX: 79 y.o. female  ROOM: Room/bed info not found     Chief Complaint   Patient presents with    Follow-up     8 month  f/u        Subjective       History of present illness  Alfreda Gruber is an 79-year-old female patient who has history of coronary artery disease status post PCI due to MI in , has COPD, ongoing smoking, here today for follow-up.  She is doing well denies having any chest pain or shortness of breath, no palpitations no lower extremity edema no syncopal episodes.     Past Medical History:   Diagnosis Date    Acute coronary syndrome     Arthritis     Bursitis     rt. hip    CAD (coronary artery disease) 2015    Cardiomyopathy     Cataract     COPD (chronic obstructive pulmonary disease)     COVID 2022    Dyspnea     GERD (gastroesophageal reflux disease)     Glaucoma     Hx of non-ST elevation myocardial infarction (NSTEMI)     Hyperlipidemia     Osteoporosis      Past Surgical History:   Procedure Laterality Date    CARDIAC SURGERY  2015    Heart Cath    CATARACT EXTRACTION      CORONARY ANGIOPLASTY WITH STENT PLACEMENT  2015    STACEY: proximal RCA    TUMOR REMOVAL      from stomach     Family History   Problem Relation Age of Onset    Hypertension Mother     Breast cancer Mother     Lung cancer Father     Heart disease Sister     Diabetes Sister     Stroke Sister     Heart attack Sister     Diabetes Paternal Grandfather      Social History     Tobacco Use    Smoking status: Every Day     Current packs/day: 0.50     Average packs/day: 0.5 packs/day for 52.6 years (26.3 ttl pk-yrs)     Types: Cigarettes     Start date:      Passive exposure: Past    Smokeless tobacco: Never   Vaping Use    Vaping status: Never Used   Substance Use Topics    Alcohol use: Never    Drug use: Never       Current Outpatient  Medications:     albuterol sulfate  (90 Base) MCG/ACT inhaler, Inhale 2 puffs Every 4 (Four) Hours As Needed for Wheezing or Shortness of Air., Disp: 6.7 g, Rfl: 2    alendronate (FOSAMAX) 70 MG tablet, Take 1 tablet by mouth Every 7 (Seven) Days., Disp: 13 tablet, Rfl: 3    aspirin 81 MG EC tablet, Take 1 tablet by mouth Daily., Disp: 30 tablet, Rfl: 11    atorvastatin (LIPITOR) 40 MG tablet, Take 1 tablet by mouth Daily., Disp: 90 tablet, Rfl: 3    fluticasone (FLONASE) 50 MCG/ACT nasal spray, Administer 2 sprays into the nostril(s) as directed by provider Daily As Needed for Rhinitis., Disp: , Rfl:     latanoprost (XALATAN) 0.005 % ophthalmic solution, Administer 1 drop to both eyes Every Night., Disp: , Rfl:     metoprolol tartrate (LOPRESSOR) 25 MG tablet, Take 1 tablet by mouth 2 (Two) Times a Day., Disp: 180 tablet, Rfl: 3  Allergies:  Penicillins      Physical Exam  VITALS REVIEWED    General:      well developed, in no acute distress.    Head:      normocephalic and atraumatic.    Eyes:      PERRL/EOM intact, conjunctiva and sclera clear with out nystagmus.    Neck:      no masses, thyromegaly,  trachea central with normal respiratory effort and PMI displaced laterally  Lungs:      Clear to auscultation bilaterally  Heart:       Rate and rhythm, no murmur  Msk:      no deformity or scoliosis noted of thoracic or lumbar spine.    Pulses:      pulses normal in all 4 extremities.    Extremities:       No lower extremity edema  Neurologic:      no focal deficits.   alert oriented x3  Skin:      intact without lesions or rashes.    Psych:      alert and cooperative; normal mood and affect; normal attention span and concentration.      Result Review :               Pertinent cardiac workup          Procedures        Assessment and Plan      Alfreda Gruber is an 79-year-old female patient who has history of coronary artery disease as detailed above, patient denies any anginal symptoms or CHF symptoms,    Previously she has had issues with bradycardia with her metoprolol was decreased and her heart rate recovered.  Her blood pressure is also well-controlled.  Will continue same therapy, will see her in 1 year.    Diagnoses and all orders for this visit:    1. Chronic coronary artery disease (Primary)    2. Primary hypertension    Other orders  -     aspirin 81 MG EC tablet; Take 1 tablet by mouth Daily.  Dispense: 30 tablet; Refill: 11  -     atorvastatin (LIPITOR) 40 MG tablet; Take 1 tablet by mouth Daily.  Dispense: 90 tablet; Refill: 3  -     metoprolol tartrate (LOPRESSOR) 25 MG tablet; Take 1 tablet by mouth 2 (Two) Times a Day.  Dispense: 180 tablet; Refill: 3           Return in about 1 year (around 7/22/2026).  Patient was given instructions and counseling regarding her condition or for health maintenance advice. Please see specific information pulled into the AVS if appropriate.       Electronically signed by Gomez Orlando MD, 07/22/25, 10:55 AM EDT.